# Patient Record
Sex: MALE | Race: WHITE | URBAN - METROPOLITAN AREA
[De-identification: names, ages, dates, MRNs, and addresses within clinical notes are randomized per-mention and may not be internally consistent; named-entity substitution may affect disease eponyms.]

---

## 2018-01-08 ENCOUNTER — APPOINTMENT (RX ONLY)
Dept: URBAN - METROPOLITAN AREA CLINIC 103 | Facility: CLINIC | Age: 68
Setting detail: DERMATOLOGY
End: 2018-01-08

## 2018-01-08 DIAGNOSIS — L57.0 ACTINIC KERATOSIS: ICD-10-CM | Status: STABLE

## 2018-01-08 DIAGNOSIS — L91.8 OTHER HYPERTROPHIC DISORDERS OF THE SKIN: ICD-10-CM

## 2018-01-08 DIAGNOSIS — L82.1 OTHER SEBORRHEIC KERATOSIS: ICD-10-CM

## 2018-01-08 PROBLEM — C44.311 BASAL CELL CARCINOMA OF SKIN OF NOSE: Status: ACTIVE | Noted: 2018-01-08

## 2018-01-08 PROBLEM — C44.529 SQUAMOUS CELL CARCINOMA OF SKIN OF OTHER PART OF TRUNK: Status: ACTIVE | Noted: 2018-01-08

## 2018-01-08 PROBLEM — C44.319 BASAL CELL CARCINOMA OF SKIN OF OTHER PARTS OF FACE: Status: ACTIVE | Noted: 2018-01-08

## 2018-01-08 PROCEDURE — 17003 DESTRUCT PREMALG LES 2-14: CPT

## 2018-01-08 PROCEDURE — ? BIOPSY BY SHAVE METHOD

## 2018-01-08 PROCEDURE — 99202 OFFICE O/P NEW SF 15 MIN: CPT | Mod: 25

## 2018-01-08 PROCEDURE — 11101: CPT

## 2018-01-08 PROCEDURE — 17000 DESTRUCT PREMALG LESION: CPT

## 2018-01-08 PROCEDURE — 11100: CPT | Mod: 59

## 2018-01-08 PROCEDURE — ? COUNSELING

## 2018-01-08 PROCEDURE — ? LIQUID NITROGEN

## 2018-01-08 ASSESSMENT — LOCATION DETAILED DESCRIPTION DERM
LOCATION DETAILED: LEFT INFERIOR FOREHEAD
LOCATION DETAILED: LEFT CLAVICULAR NECK

## 2018-01-08 ASSESSMENT — LOCATION SIMPLE DESCRIPTION DERM
LOCATION SIMPLE: LEFT ANTERIOR NECK
LOCATION SIMPLE: LEFT FOREHEAD

## 2018-01-08 ASSESSMENT — LOCATION ZONE DERM
LOCATION ZONE: FACE
LOCATION ZONE: NECK

## 2018-01-08 NOTE — PROCEDURE: LIQUID NITROGEN
Detail Level: Detailed
Consent: The patient's consent was obtained including but not limited to risks of crusting, scabbing, blistering, scarring, darker or lighter pigmentary change, recurrence, incomplete removal and infection.
Render Post-Care Instructions In Note?: no
Post-Care Instructions: I reviewed with the patient in detail post-care instructions. Patient is to wear sunprotection, and avoid picking at any of the treated lesions. Pt may apply Vaseline to crusted or scabbing areas.
Medical Necessity Clause: This procedure was medically necessary because the lesions that were treated were:
Detail Level: Zone
Duration Of Freeze Thaw-Cycle (Seconds): 0
Medical Necessity Information: It is in your best interest to select a reason for this procedure from the list below. All of these items fulfill various CMS LCD requirements except the new and changing color options.
Total Number Of Aks Treated: 3

## 2018-01-08 NOTE — PROCEDURE: BIOPSY BY SHAVE METHOD
Biopsy Type: H and E
Hemostasis: Drysol
Post-Care Instructions: I reviewed with the patient in detail post-care instructions. Patient is to keep the biopsy site dry overnight, and then apply bacitracin twice daily until healed. Patient may apply hydrogen peroxide soaks to remove any crusting.
Render Post-Care Instructions In Note?: no
X Size Of Lesion In Cm: 0
Anesthesia Type: 1% lidocaine with epinephrine
Size Of Lesion In Cm: 0.7
Biopsy Method: Acu-Razor
Dressing: bandage
Billing Type: Third-Party Bill
Curettage Text: The wound bed was treated with curettage after the biopsy was performed.
Electrodesiccation Text: The wound bed was treated with electrodesiccation after the biopsy was performed.
Notification Instructions: Patient will be notified of biopsy results. However, patient instructed to call the office if not contacted within 2 weeks.
Electrodesiccation And Curettage Text: The wound bed was treated with electrodesiccation and curettage after the biopsy was performed.
Anesthesia Volume In Cc: 0.5
Size Of Lesion In Cm: 0.4
Cryotherapy Text: The wound bed was treated with cryotherapy after the biopsy was performed.
Detail Level: Detailed
Consent: Written consent was obtained and risks were reviewed including but not limited to scarring, infection, bleeding, scabbing, incomplete removal, nerve damage and allergy to anesthesia.
Type Of Destruction Used: Curettage
Wound Care: Petrolatum
Silver Nitrate Text: The wound bed was treated with silver nitrate after the biopsy was performed.

## 2018-01-24 ENCOUNTER — APPOINTMENT (RX ONLY)
Dept: URBAN - METROPOLITAN AREA CLINIC 103 | Facility: CLINIC | Age: 68
Setting detail: DERMATOLOGY
End: 2018-01-24

## 2018-01-24 VITALS — SYSTOLIC BLOOD PRESSURE: 124 MMHG | DIASTOLIC BLOOD PRESSURE: 79 MMHG

## 2018-01-24 PROBLEM — C44.311 BASAL CELL CARCINOMA OF SKIN OF NOSE: Status: ACTIVE | Noted: 2018-01-24

## 2018-01-24 PROCEDURE — ? MOHS SURGERY

## 2018-01-24 PROCEDURE — 17311 MOHS 1 STAGE H/N/HF/G: CPT

## 2018-01-24 NOTE — PROCEDURE: MOHS SURGERY
Composite Graft Text: The defect edges were debeveled with a #15 scalpel blade.  Given the location of the defect, shape of the defect, the proximity to free margins and the fact the defect was full thickness a composite graft was deemed most appropriate.  The defect was outline and then transferred to the donor site.  A full thickness graft was then excised from the donor site. The graft was then placed in the primary defect, oriented appropriately and then sutured into place.  The secondary defect was then repaired using a primary closure.
Stage 11: Additional Anesthesia Type: 1% lidocaine with epinephrine
Tissue Cultured Epidermal Autograft Text: The defect edges were debeveled with a #15 scalpel blade.  Given the location of the defect, shape of the defect and the proximity to free margins a tissue cultured epidermal autograft was deemed most appropriate.  The graft was then trimmed to fit the size of the defect.  The graft was then placed in the primary defect and oriented appropriately.
Secondary Defect Length In Cm (Required For Flaps): 0
Bill For Surgical Tray: no
Complex Repair Preamble Text (Leave Blank If You Do Not Want): Extensive wide undermining was performed.
Display The Individual Mohs Indications As Separate Paragraphs: Yes
Localized Dermabrasion With Wire Brush Text: The patient was draped in routine manner.  Localized dermabrasion using 3 x 17 mm wire brush was performed in routine manner to papillary dermis. This spot dermabrasion is being performed to complete skin cancer reconstruction. It also will eliminate the other sun damaged precancerous cells that are known to be part of the regional effect of a lifetime's worth of sun exposure. This localized dermabrasion is therapeutic and should not be considered cosmetic in any regard.
Skin Substitute Text: The defect edges were debeveled with a #15 scalpel blade.  Given the location of the defect, shape of the defect and the proximity to free margins a skin substitute graft was deemed most appropriate.  The graft material was trimmed to fit the size of the defect. The graft was then placed in the primary defect and oriented appropriately.
Area L Indication Text: Tumors in this location are included in Area L (trunk and extremities).  Mohs surgery is indicated for larger tumors, or tumors with aggressive histologic features, in these anatomic locations.
Bi-Rhombic Flap Text: The defect edges were debeveled with a #15 scalpel blade.  Given the location of the defect and the proximity to free margins a bi-rhombic flap was deemed most appropriate.  Using a sterile surgical marker, an appropriate rhombic flap was drawn incorporating the defect. The area thus outlined was incised deep to adipose tissue with a #15 scalpel blade.  The skin margins were undermined to an appropriate distance in all directions utilizing iris scissors.
Inflammation Suggestive Of Cancer Camouflage Histology Text: There was a dense lymphocytic infiltrate which prevented adequate histologic evaluation of adjacent structures.
Mucosal Advancement Flap Text: Given the location of the defect, shape of the defect and the proximity to free margins a mucosal advancement flap was deemed most appropriate. Incisions were made with a 15 blade scalpel in the appropriate fashion along the cutaneous vermilion border and the mucosal lip. The remaining actinically damaged mucosal tissue was excised.  The mucosal advancement flap was then elevated to the gingival sulcus with care taken to preserve the neurovascular structures and advanced into the primary defect. Care was taken to ensure that precise realignment of the vermilion border was achieved.
Mohs Method Verbiage: An incision at a 45 degree angle following the standard Mohs approach was done and the specimen was harvested as a microscopic controlled layer.
Consent 3/Introductory Paragraph: I gave the patient a chance to ask questions they had about the procedure.  Following this I explained the Mohs procedure and consent was obtained. The risks, benefits and alternatives to therapy were discussed in detail. Specifically, the risks of infection, scarring, bleeding, prolonged wound healing, incomplete removal, allergy to anesthesia, nerve injury and recurrence were addressed. Prior to the procedure, the treatment site was clearly identified and confirmed by the patient. All components of Universal Protocol/PAUSE Rule completed.
Wound Care: Petrolatum
Burow's Advancement Flap Text: The defect edges were debeveled with a #15 scalpel blade.  Given the location of the defect and the proximity to free margins a Burow's advancement flap was deemed most appropriate.  Using a sterile surgical marker, the appropriate advancement flap was drawn incorporating the defect and placing the expected incisions within the relaxed skin tension lines where possible.    The area thus outlined was incised deep to adipose tissue with a #15 scalpel blade.  The skin margins were undermined to an appropriate distance in all directions utilizing iris scissors.
Consent 1/Introductory Paragraph: The rationale for Mohs was explained to the patient and consent was obtained. The risks, benefits and alternatives to therapy were discussed in detail. Specifically, the risks of infection, scarring, bleeding, prolonged wound healing, incomplete removal, allergy to anesthesia, nerve injury and recurrence were addressed. Prior to the procedure, the treatment site was clearly identified and confirmed by the patient. All components of Universal Protocol/PAUSE Rule completed.
O-T Advancement Flap Text: The defect edges were debeveled with a #15 scalpel blade.  Given the location of the defect, shape of the defect and the proximity to free margins an O-T advancement flap was deemed most appropriate.  Using a sterile surgical marker, an appropriate advancement flap was drawn incorporating the defect and placing the expected incisions within the relaxed skin tension lines where possible.    The area thus outlined was incised deep to adipose tissue with a #15 scalpel blade.  The skin margins were undermined to an appropriate distance in all directions utilizing iris scissors.
V-Y Flap Text: The defect edges were debeveled with a #15 scalpel blade.  Given the location of the defect, shape of the defect and the proximity to free margins a V-Y flap was deemed most appropriate.  Using a sterile surgical marker, an appropriate advancement flap was drawn incorporating the defect and placing the expected incisions within the relaxed skin tension lines where possible.    The area thus outlined was incised deep to adipose tissue with a #15 scalpel blade.  The skin margins were undermined to an appropriate distance in all directions utilizing iris scissors.
Consent (Spinal Accessory)/Introductory Paragraph: The rationale for Mohs was explained to the patient and consent was obtained. The risks, benefits and alternatives to therapy were discussed in detail. Specifically, the risks of damage to the spinal accessory nerve, infection, scarring, bleeding, prolonged wound healing, incomplete removal, allergy to anesthesia, and recurrence were addressed. Prior to the procedure, the treatment site was clearly identified and confirmed by the patient. All components of Universal Protocol/PAUSE Rule completed.
H Plasty Text: Given the location of the defect, shape of the defect and the proximity to free margins a H-plasty was deemed most appropriate for repair.  Using a sterile surgical marker, the appropriate advancement arms of the H-plasty were drawn incorporating the defect and placing the expected incisions within the relaxed skin tension lines where possible. The area thus outlined was incised deep to adipose tissue with a #15 scalpel blade. The skin margins were undermined to an appropriate distance in all directions utilizing iris scissors.  The opposing advancement arms were then advanced into place in opposite direction and anchored with interrupted buried subcutaneous sutures.
Referred To Asc For Closure Text (Leave Blank If You Do Not Want): After obtaining clear surgical margins the patient was sent to an ASC for surgical repair.  The patient understands they will receive post-surgical care and follow-up from the ASC physician.
Tarsorrhaphy Text: A tarsorrhaphy was performed using Frost sutures.
Purse String (Intermediate) Text: Given the location of the defect and the characteristics of the surrounding skin a purse string intermediate closure was deemed most appropriate.  Undermining was performed circumfirentially around the surgical defect.  A purse string suture was then placed and tightened.
Repair Type: No repair - secondary intention
Crescentic Intermediate Repair Preamble Text (Leave Blank If You Do Not Want): Undermining was performed with blunt dissection.
Modified Advancement Flap Text: The defect edges were debeveled with a #15 scalpel blade.  Given the location of the defect, shape of the defect and the proximity to free margins a modified advancement flap was deemed most appropriate.  Using a sterile surgical marker, an appropriate advancement flap was drawn incorporating the defect and placing the expected incisions within the relaxed skin tension lines where possible.    The area thus outlined was incised deep to adipose tissue with a #15 scalpel blade.  The skin margins were undermined to an appropriate distance in all directions utilizing iris scissors.
Surgical Defect Length In Cm (Optional): 0.6
Partial Purse String (Intermediate) Text: Given the location of the defect and the characteristics of the surrounding skin an intermediate purse string closure was deemed most appropriate.  Undermining was performed circumfirentially around the surgical defect.  A purse string suture was then placed and tightened. Wound tension only allowed a partial closure of the circular defect.
Number Of Stages: 1
Alar Island Pedicle Flap Text: The defect edges were debeveled with a #15 scalpel blade.  Given the location of the defect, shape of the defect and the proximity to the alar rim an island pedicle advancement flap was deemed most appropriate.  Using a sterile surgical marker, an appropriate advancement flap was drawn incorporating the defect, outlining the appropriate donor tissue and placing the expected incisions within the nasal ala running parallel to the alar rim. The area thus outlined was incised with a #15 scalpel blade.  The skin margins were undermined minimally to an appropriate distance in all directions around the primary defect and laterally outward around the island pedicle utilizing iris scissors.  There was minimal undermining beneath the pedicle flap.
Dressing (No Sutures): dry sterile dressing
Secondary Intention Text (Leave Blank If You Do Not Want): The defect will heal with secondary intention.
Keystone Flap Text: The defect edges were debeveled with a #15 scalpel blade.  Given the location of the defect, shape of the defect a keystone flap was deemed most appropriate.  Using a sterile surgical marker, an appropriate keystone flap was drawn incorporating the defect, outlining the appropriate donor tissue and placing the expected incisions within the relaxed skin tension lines where possible. The area thus outlined was incised deep to adipose tissue with a #15 scalpel blade.  The skin margins were undermined to an appropriate distance in all directions around the primary defect and laterally outward around the flap utilizing iris scissors.
Bcc Histology Text: There were numerous aggregates of basaloid cells.
Paramedian Forehead Flap Text: A decision was made to reconstruct the defect utilizing an interpolation axial flap and a staged reconstruction.  A telfa template was made of the defect.  This telfa template was then used to outline the paramedian forehead pedicle flap.  The donor area for the pedicle flap was then injected with anesthesia.  The flap was excised through the skin and subcutaneous tissue down to the layer of the underlying musculature.  The pedicle flap was carefully excised within this deep plane to maintain its blood supply.  The edges of the donor site were undermined.   The donor site was closed in a primary fashion.  The pedicle was then rotated into position and sutured.  Once the tube was sutured into place, adequate blood supply was confirmed with blanching and refill.  The pedicle was then wrapped with xeroform gauze and dressed appropriately with a telfa and gauze bandage to ensure continued blood supply and protect the attached pedicle.
Helical Rim Advancement Flap Text: The defect edges were debeveled with a #15 blade scalpel.  Given the location of the defect and the proximity to free margins (helical rim) a double helical rim advancement flap was deemed most appropriate.  Using a sterile surgical marker, the appropriate advancement flaps were drawn incorporating the defect and placing the expected incisions between the helical rim and antihelix where possible.  The area thus outlined was incised through and through with a #15 scalpel blade.  With a skin hook and iris scissors, the flaps were gently and sharply undermined and freed up.
Closure 2 Information: This tab is for additional flaps and grafts, including complex repair and grafts and complex repair and flaps. You can also specify a different location for the additional defect, if the location is the same you do not need to select a new one. We will insert the automated text for the repair you select below just as we do for solitary flaps and grafts. Please note that at this time if you select a location with a different insurance zone you will need to override the ICD10 and CPT if appropriate.
Eye Protection Verbiage: Before proceeding with the stage, a plastic scleral shield was inserted. The globe was anesthetized with a few drops of 1% lidocaine with 1:100,000 epinephrine. Then, an appropriate sized scleral shield was chosen and coated with lacrilube ointment. The shield was gently inserted and left in place for the duration of each stage. After the stage was completed, the shield was gently removed.
V-Y Plasty Text: The defect edges were debeveled with a #15 scalpel blade.  Given the location of the defect, shape of the defect and the proximity to free margins an V-Y advancement flap was deemed most appropriate.  Using a sterile surgical marker, an appropriate advancement flap was drawn incorporating the defect and placing the expected incisions within the relaxed skin tension lines where possible.    The area thus outlined was incised deep to adipose tissue with a #15 scalpel blade.  The skin margins were undermined to an appropriate distance in all directions utilizing iris scissors.
Closure 3 Information: This tab is for additional flaps and grafts above and beyond our usual structured repairs.  Please note if you enter information here it will not currently bill and you will need to add the billing information manually.
Rhombic Flap Text: The defect edges were debeveled with a #15 scalpel blade.  Given the location of the defect and the proximity to free margins a rhombic flap was deemed most appropriate.  Using a sterile surgical marker, an appropriate rhombic flap was drawn incorporating the defect.    The area thus outlined was incised deep to adipose tissue with a #15 scalpel blade.  The skin margins were undermined to an appropriate distance in all directions utilizing iris scissors.
Bilobed Transposition Flap Text: The defect edges were debeveled with a #15 scalpel blade.  Given the location of the defect and the proximity to free margins a bilobed transposition flap was deemed most appropriate.  Using a sterile surgical marker, an appropriate bilobe flap drawn around the defect.    The area thus outlined was incised deep to adipose tissue with a #15 scalpel blade.  The skin margins were undermined to an appropriate distance in all directions utilizing iris scissors.
Same Histology In Subsequent Stages Text: The pattern and morphology of the tumor is as described in the first stage.
Advancement-Rotation Flap Text: The defect edges were debeveled with a #15 scalpel blade.  Given the location of the defect, shape of the defect and the proximity to free margins an advancement-rotation flap was deemed most appropriate.  Using a sterile surgical marker, an appropriate flap was drawn incorporating the defect and placing the expected incisions within the relaxed skin tension lines where possible. The area thus outlined was incised deep to adipose tissue with a #15 scalpel blade.  The skin margins were undermined to an appropriate distance in all directions utilizing iris scissors.
Muscle Hinge Flap Text: The defect edges were debeveled with a #15 scalpel blade.  Given the size, depth and location of the defect and the proximity to free margins a muscle hinge flap was deemed most appropriate.  Using a sterile surgical marker, an appropriate hinge flap was drawn incorporating the defect. The area thus outlined was incised with a #15 scalpel blade.  The skin margins were undermined to an appropriate distance in all directions utilizing iris scissors.
Epidermal Closure: simple interrupted
Cheiloplasty (Less Than 50%) Text: A decision was made to reconstruct the defect with a  cheiloplasty.  The defect was undermined extensively.  Additional obicularis oris muscle was excised with a 15 blade scalpel.  The defect was converted into a full thickness wedge, of less than 50% of the vertical height of the lip, to facilite a better cosmetic result.  Small vessels were then tied off with 5-0 monocyrl. The obicularis oris, superficial fascia, adipose and dermis were then reapproximated.  After the deeper layers were approximated the epidermis was reapproximated with particular care given to realign the vermilion border.
Home Suture Removal Text: Patient was provided instructions on removing sutures and will remove their sutures at home.  If they have any questions or difficulties they will call the office.
Mastoid Interpolation Flap Text: A decision was made to reconstruct the defect utilizing an interpolation axial flap and a staged reconstruction.  A telfa template was made of the defect.  This telfa template was then used to outline the mastoid interpolation flap.  The donor area for the pedicle flap was then injected with anesthesia.  The flap was excised through the skin and subcutaneous tissue down to the layer of the underlying musculature.  The pedicle flap was carefully excised within this deep plane to maintain its blood supply.  The edges of the donor site were undermined.   The donor site was closed in a primary fashion.  The pedicle was then rotated into position and sutured.  Once the tube was sutured into place, adequate blood supply was confirmed with blanching and refill.  The pedicle was then wrapped with xeroform gauze and dressed appropriately with a telfa and gauze bandage to ensure continued blood supply and protect the attached pedicle.
Split-Thickness Skin Graft Text: The defect edges were debeveled with a #15 scalpel blade.  Given the location of the defect, shape of the defect and the proximity to free margins a split thickness skin graft was deemed most appropriate.  Using a sterile surgical marker, the primary defect shape was transferred to the donor site. The split thickness graft was then harvested.  The skin graft was then placed in the primary defect and oriented appropriately.
O-L Flap Text: The defect edges were debeveled with a #15 scalpel blade.  Given the location of the defect, shape of the defect and the proximity to free margins an O-L flap was deemed most appropriate.  Using a sterile surgical marker, an appropriate advancement flap was drawn incorporating the defect and placing the expected incisions within the relaxed skin tension lines where possible.    The area thus outlined was incised deep to adipose tissue with a #15 scalpel blade.  The skin margins were undermined to an appropriate distance in all directions utilizing iris scissors.
Alternatives Discussed Intro (Do Not Add Period): I discussed alternative treatments to Mohs surgery and specifically discussed the risks and benefits of
Purse String (Simple) Text: Given the location of the defect and the characteristics of the surrounding skin a purse string closure was deemed most appropriate.  Undermining was performed circumfirentially around the surgical defect.  A purse string suture was then placed and tightened.
O-Z Plasty Text: The defect edges were debeveled with a #15 scalpel blade.  Given the location of the defect, shape of the defect and the proximity to free margins an O-Z plasty (double transposition flap) was deemed most appropriate.  Using a sterile surgical marker, the appropriate transposition flaps were drawn incorporating the defect and placing the expected incisions within the relaxed skin tension lines where possible.    The area thus outlined was incised deep to adipose tissue with a #15 scalpel blade.  The skin margins were undermined to an appropriate distance in all directions utilizing iris scissors.  Hemostasis was achieved with electrocautery.  The flaps were then transposed into place, one clockwise and the other counterclockwise, and anchored with interrupted buried subcutaneous sutures.
Donor Site Anesthesia Type: same as repair anesthesia
Trilobed Flap Text: The defect edges were debeveled with a #15 scalpel blade.  Given the location of the defect and the proximity to free margins a trilobed flap was deemed most appropriate.  Using a sterile surgical marker, an appropriate trilobed flap drawn around the defect.    The area thus outlined was incised deep to adipose tissue with a #15 scalpel blade.  The skin margins were undermined to an appropriate distance in all directions utilizing iris scissors.
Subsequent Stages Histo Method Verbiage: Using a similar technique to that described above, a thin layer of tissue was removed from all areas where tumor was visible on the previous stage.  The tissue was again oriented, mapped, dyed, and processed as above.
Ear Star Wedge Flap Text: The defect edges were debeveled with a #15 blade scalpel.  Given the location of the defect and the proximity to free margins (helical rim) an ear star wedge flap was deemed most appropriate.  Using a sterile surgical marker, the appropriate flap was drawn incorporating the defect and placing the expected incisions between the helical rim and antihelix where possible.  The area thus outlined was incised through and through with a #15 scalpel blade.
Z Plasty Text: The lesion was extirpated to the level of the fat with a #15 scalpel blade.  Given the location of the defect, shape of the defect and the proximity to free margins a Z-plasty was deemed most appropriate for repair.  Using a sterile surgical marker, the appropriate transposition arms of the Z-plasty were drawn incorporating the defect and placing the expected incisions within the relaxed skin tension lines where possible.    The area thus outlined was incised deep to adipose tissue with a #15 scalpel blade.  The skin margins were undermined to an appropriate distance in all directions utilizing iris scissors.  The opposing transposition arms were then transposed into place in opposite direction and anchored with interrupted buried subcutaneous sutures.
S Plasty Text: Given the location and shape of the defect, and the orientation of relaxed skin tension lines, an S-plasty was deemed most appropriate for repair.  Using a sterile surgical marker, the appropriate outline of the S-plasty was drawn, incorporating the defect and placing the expected incisions within the relaxed skin tension lines where possible.  The area thus outlined was incised deep to adipose tissue with a #15 scalpel blade.  The skin margins were undermined to an appropriate distance in all directions utilizing iris scissors. The skin flaps were advanced over the defect.  The opposing margins were then approximated with interrupted buried subcutaneous sutures.
Referred To Otolaryngology For Closure Text (Leave Blank If You Do Not Want): After obtaining clear surgical margins the patient was sent to otolaryngology for surgical repair.  The patient understands they will receive post-surgical care and follow-up from the referring physician's office.
Ftsg Text: The defect edges were debeveled with a #15 scalpel blade.  Given the location of the defect, shape of the defect and the proximity to free margins a full thickness skin graft was deemed most appropriate.  Using a sterile surgical marker, the primary defect shape was transferred to the donor site. The area thus outlined was incised deep to adipose tissue with a #15 scalpel blade.  The harvested graft was then trimmed of adipose tissue until only dermis and epidermis was left.  The skin margins of the secondary defect were undermined to an appropriate distance in all directions utilizing iris scissors.  The secondary defect was closed with interrupted buried subcutaneous sutures.  The skin edges were then re-apposed with running  sutures.  The skin graft was then placed in the primary defect and oriented appropriately.
Crescentic Advancement Flap Text: The defect edges were debeveled with a #15 scalpel blade.  Given the location of the defect and the proximity to free margins a crescentic advancement flap was deemed most appropriate.  Using a sterile surgical marker, the appropriate advancement flap was drawn incorporating the defect and placing the expected incisions within the relaxed skin tension lines where possible.    The area thus outlined was incised deep to adipose tissue with a #15 scalpel blade.  The skin margins were undermined to an appropriate distance in all directions utilizing iris scissors.
Referred To Plastics For Closure Text (Leave Blank If You Do Not Want): After obtaining clear surgical margins the patient was sent to plastics for surgical repair.  The patient understands they will receive post-surgical care and follow-up from the referring physician's office.
Location Indication Override (Is Already Calculated Based On Selected Body Location): Area H
Unna Boot Text: An Unna boot was placed to help immobilize the limb and facilitate more rapid healing.
Partial Purse String (Simple) Text: Given the location of the defect and the characteristics of the surrounding skin a simple purse string closure was deemed most appropriate.  Undermining was performed circumfirentially around the surgical defect.  A purse string suture was then placed and tightened. Wound tension only allowed a partial closure of the circular defect.
Consent (Scalp)/Introductory Paragraph: The rationale for Mohs was explained to the patient and consent was obtained. The risks, benefits and alternatives to therapy were discussed in detail. Specifically, the risks of changes in hair growth pattern secondary to repair, infection, scarring, bleeding, prolonged wound healing, incomplete removal, allergy to anesthesia, nerve injury and recurrence were addressed. Prior to the procedure, the treatment site was clearly identified and confirmed by the patient. All components of Universal Protocol/PAUSE Rule completed.
Mohs Case Number: [id number]
Melolabial Transposition Flap Text: The defect edges were debeveled with a #15 scalpel blade.  Given the location of the defect and the proximity to free margins a melolabial flap was deemed most appropriate.  Using a sterile surgical marker, an appropriate melolabial transposition flap was drawn incorporating the defect.    The area thus outlined was incised deep to adipose tissue with a #15 scalpel blade.  The skin margins were undermined to an appropriate distance in all directions utilizing iris scissors.
Dorsal Nasal Flap Text: The defect edges were debeveled with a #15 scalpel blade.  Given the location of the defect and the proximity to free margins a dorsal nasal flap was deemed most appropriate.  Using a sterile surgical marker, an appropriate dorsal nasal flap was drawn around the defect.    The area thus outlined was incised deep to adipose tissue with a #15 scalpel blade.  The skin margins were undermined to an appropriate distance in all directions utilizing iris scissors.
Surgeon/Pathologist Verbiage (Will Incorporate Name Of Surgeon From Intro If Not Blank): operated in two distinct and integrated capacities as the surgeon and pathologist.
Advancement Flap (Double) Text: The defect edges were debeveled with a #15 scalpel blade.  Given the location of the defect and the proximity to free margins a double advancement flap was deemed most appropriate.  Using a sterile surgical marker, the appropriate advancement flaps were drawn incorporating the defect and placing the expected incisions within the relaxed skin tension lines where possible.    The area thus outlined was incised deep to adipose tissue with a #15 scalpel blade.  The skin margins were undermined to an appropriate distance in all directions utilizing iris scissors.
Bcc Infiltrative Histology Text: There were numerous aggregates of basaloid cells demonstrating an infiltrative pattern.
A-T Advancement Flap Text: The defect edges were debeveled with a #15 scalpel blade.  Given the location of the defect, shape of the defect and the proximity to free margins an A-T advancement flap was deemed most appropriate.  Using a sterile surgical marker, an appropriate advancement flap was drawn incorporating the defect and placing the expected incisions within the relaxed skin tension lines where possible.    The area thus outlined was incised deep to adipose tissue with a #15 scalpel blade.  The skin margins were undermined to an appropriate distance in all directions utilizing iris scissors.
Hemostasis: Electrocautery
Bilobed Flap Text: The defect edges were debeveled with a #15 scalpel blade.  Given the location of the defect and the proximity to free margins a bilobe flap was deemed most appropriate.  Using a sterile surgical marker, an appropriate bilobe flap drawn around the defect.    The area thus outlined was incised deep to adipose tissue with a #15 scalpel blade.  The skin margins were undermined to an appropriate distance in all directions utilizing iris scissors.
Rotation Flap Text: The defect edges were debeveled with a #15 scalpel blade.  Given the location of the defect, shape of the defect and the proximity to free margins a rotation flap was deemed most appropriate.  Using a sterile surgical marker, an appropriate rotation flap was drawn incorporating the defect and placing the expected incisions within the relaxed skin tension lines where possible.    The area thus outlined was incised deep to adipose tissue with a #15 scalpel blade.  The skin margins were undermined to an appropriate distance in all directions utilizing iris scissors.
Double Island Pedicle Flap Text: The defect edges were debeveled with a #15 scalpel blade.  Given the location of the defect, shape of the defect and the proximity to free margins a double island pedicle advancement flap was deemed most appropriate.  Using a sterile surgical marker, an appropriate advancement flap was drawn incorporating the defect, outlining the appropriate donor tissue and placing the expected incisions within the relaxed skin tension lines where possible.    The area thus outlined was incised deep to adipose tissue with a #15 scalpel blade.  The skin margins were undermined to an appropriate distance in all directions around the primary defect and laterally outward around the island pedicle utilizing iris scissors.  There was minimal undermining beneath the pedicle flap.
Area H Indication Text: Tumors in this location are included in Area H (eyelids, eyebrows, nose, lips, chin, ear, pre-auricular, post-auricular, temple, genitalia, hands, feet, ankles and areola).  Tissue conservation is critical in these anatomic locations.
Manual Repair Warning Statement: We plan on removing the manually selected variable below in favor of our much easier automatic structured text blocks found in the previous tab. We decided to do this to help make the flow better and give you the full power of structured data. Manual selection is never going to be ideal in our platform and I would encourage you to avoid using manual selection from this point on, especially since I will be sunsetting this feature. It is important that you do one of two things with the customized text below. First, you can save all of the text in a word file so you can have it for future reference. Second, transfer the text to the appropriate area in the Library tab. Lastly, if there is a flap or graft type which we do not have you need to let us know right away so I can add it in before the variable is hidden. No need to panic, we plan to give you roughly 6 months to make the change.
Melolabial Interpolation Flap Text: A decision was made to reconstruct the defect utilizing an interpolation axial flap and a staged reconstruction.  A telfa template was made of the defect.  This telfa template was then used to outline the melolabial interpolation flap.  The donor area for the pedicle flap was then injected with anesthesia.  The flap was excised through the skin and subcutaneous tissue down to the layer of the underlying musculature.  The pedicle flap was carefully excised within this deep plane to maintain its blood supply.  The edges of the donor site were undermined.   The donor site was closed in a primary fashion.  The pedicle was then rotated into position and sutured.  Once the tube was sutured into place, adequate blood supply was confirmed with blanching and refill.  The pedicle was then wrapped with xeroform gauze and dressed appropriately with a telfa and gauze bandage to ensure continued blood supply and protect the attached pedicle.
Mohs Rapid Report Verbiage: The area of clinically evident tumor was marked with skin marking ink and appropriately hatched.  The initial incision was made following the Mohs approach through the skin.  The specimen was taken to the lab, divided into the necessary number of pieces, chromacoded and processed according to the Mohs protocol.  This was repeated in successive stages until a tumor free defect was achieved.
Postop Diagnosis: same
Island Pedicle Flap With Canthal Suspension Text: The defect edges were debeveled with a #15 scalpel blade.  Given the location of the defect, shape of the defect and the proximity to free margins an island pedicle advancement flap was deemed most appropriate.  Using a sterile surgical marker, an appropriate advancement flap was drawn incorporating the defect, outlining the appropriate donor tissue and placing the expected incisions within the relaxed skin tension lines where possible. The area thus outlined was incised deep to adipose tissue with a #15 scalpel blade.  The skin margins were undermined to an appropriate distance in all directions around the primary defect and laterally outward around the island pedicle utilizing iris scissors.  There was minimal undermining beneath the pedicle flap. A suspension suture was placed in the canthal tendon to prevent tension and prevent ectropion.
Island Pedicle Flap Text: The defect edges were debeveled with a #15 scalpel blade.  Given the location of the defect, shape of the defect and the proximity to free margins an island pedicle advancement flap was deemed most appropriate.  Using a sterile surgical marker, an appropriate advancement flap was drawn incorporating the defect, outlining the appropriate donor tissue and placing the expected incisions within the relaxed skin tension lines where possible.    The area thus outlined was incised deep to adipose tissue with a #15 scalpel blade.  The skin margins were undermined to an appropriate distance in all directions around the primary defect and laterally outward around the island pedicle utilizing iris scissors.  There was minimal undermining beneath the pedicle flap.
Cartilage Graft Text: The defect edges were debeveled with a #15 scalpel blade.  Given the location of the defect, shape of the defect, the fact the defect involved a full thickness cartilage defect a cartilage graft was deemed most appropriate.  An appropriate donor site was identified, cleansed, and anesthetized. The cartilage graft was then harvested and transferred to the recipient site, oriented appropriately and then sutured into place.  The secondary defect was then repaired using a primary closure.
No Residual Tumor Seen Histology Text: There were no malignant cells seen in the sections examined.
Date Of Previous Biopsy (Optional): 01/08/18
Epidermal Sutures: 6-0 Ethilon
Suture Removal: 7 days
Graft Donor Site Bandage (Optional-Leave Blank If You Don't Want In Note): Steri-strips and a pressure bandage were applied to the donor site.
Xenograft Text: The defect edges were debeveled with a #15 scalpel blade.  Given the location of the defect, shape of the defect and the proximity to free margins a xenograft was deemed most appropriate.  The graft was then trimmed to fit the size of the defect.  The graft was then placed in the primary defect and oriented appropriately.
Consent (Marginal Mandibular)/Introductory Paragraph: The rationale for Mohs was explained to the patient and consent was obtained. The risks, benefits and alternatives to therapy were discussed in detail. Specifically, the risks of damage to the marginal mandibular branch of the facial nerve, infection, scarring, bleeding, prolonged wound healing, incomplete removal, allergy to anesthesia, and recurrence were addressed. Prior to the procedure, the treatment site was clearly identified and confirmed by the patient. All components of Universal Protocol/PAUSE Rule completed.
Transposition Flap Text: The defect edges were debeveled with a #15 scalpel blade.  Given the location of the defect and the proximity to free margins a transposition flap was deemed most appropriate.  Using a sterile surgical marker, an appropriate transposition flap was drawn incorporating the defect.    The area thus outlined was incised deep to adipose tissue with a #15 scalpel blade.  The skin margins were undermined to an appropriate distance in all directions utilizing iris scissors.
Consent (Nose)/Introductory Paragraph: The rationale for Mohs was explained to the patient and consent was obtained. The risks, benefits and alternatives to therapy were discussed in detail. Specifically, the risks of nasal deformity, changes in the flow of air through the nose, infection, scarring, bleeding, prolonged wound healing, incomplete removal, allergy to anesthesia, nerve injury and recurrence were addressed. Prior to the procedure, the treatment site was clearly identified and confirmed by the patient. All components of Universal Protocol/PAUSE Rule completed.
Area M Indication Text: Tumors in this location are included in Area M (cheek, forehead, scalp, neck, jawline and pretibial skin).  Mohs surgery is indicated for tumors in these anatomic locations.
O-T Plasty Text: The defect edges were debeveled with a #15 scalpel blade.  Given the location of the defect, shape of the defect and the proximity to free margins an O-T plasty was deemed most appropriate.  Using a sterile surgical marker, an appropriate O-T plasty was drawn incorporating the defect and placing the expected incisions within the relaxed skin tension lines where possible.    The area thus outlined was incised deep to adipose tissue with a #15 scalpel blade.  The skin margins were undermined to an appropriate distance in all directions utilizing iris scissors.
Hatchet Flap Text: The defect edges were debeveled with a #15 scalpel blade.  Given the location of the defect, shape of the defect and the proximity to free margins a hatchet flap was deemed most appropriate.  Using a sterile surgical marker, an appropriate hatchet flap was drawn incorporating the defect and placing the expected incisions within the relaxed skin tension lines where possible.    The area thus outlined was incised deep to adipose tissue with a #15 scalpel blade.  The skin margins were undermined to an appropriate distance in all directions utilizing iris scissors.
W Plasty Text: The lesion was extirpated to the level of the fat with a #15 scalpel blade.  Given the location of the defect, shape of the defect and the proximity to free margins a W-plasty was deemed most appropriate for repair.  Using a sterile surgical marker, the appropriate transposition arms of the W-plasty were drawn incorporating the defect and placing the expected incisions within the relaxed skin tension lines where possible.    The area thus outlined was incised deep to adipose tissue with a #15 scalpel blade.  The skin margins were undermined to an appropriate distance in all directions utilizing iris scissors.  The opposing transposition arms were then transposed into place in opposite direction and anchored with interrupted buried subcutaneous sutures.
Mohs Histo Method Verbiage: Each section was then chromacoded and processed in the Mohs lab using the Mohs protocol and submitted for frozen section.
Spiral Flap Text: The defect edges were debeveled with a #15 scalpel blade.  Given the location of the defect, shape of the defect and the proximity to free margins a spiral flap was deemed most appropriate.  Using a sterile surgical marker, an appropriate rotation flap was drawn incorporating the defect and placing the expected incisions within the relaxed skin tension lines where possible. The area thus outlined was incised deep to adipose tissue with a #15 scalpel blade.  The skin margins were undermined to an appropriate distance in all directions utilizing iris scissors.
Initial Size Of Lesion: 0.5
Referred To Oculoplastics For Closure Text (Leave Blank If You Do Not Want): After obtaining clear surgical margins the patient was sent to oculoplastics for surgical repair.  The patient understands they will receive post-surgical care and follow-up from the referring physician's office.
Ear Wedge Repair Text: A wedge excision was completed by carrying down an excision through the full thickness of the ear and cartilage with an inward facing Burow's triangle. The wound was then closed in a layered fashion.
Dermal Autograft Text: The defect edges were debeveled with a #15 scalpel blade.  Given the location of the defect, shape of the defect and the proximity to free margins a dermal autograft was deemed most appropriate.  Using a sterile surgical marker, the primary defect shape was transferred to the donor site. The area thus outlined was incised deep to adipose tissue with a #15 scalpel blade.  The harvested graft was then trimmed of adipose and epidermal tissue until only dermis was left.  The skin graft was then placed in the primary defect and oriented appropriately.
Bilateral Helical Rim Advancement Flap Text: The defect edges were debeveled with a #15 blade scalpel.  Given the location of the defect and the proximity to free margins (helical rim) a bilateral helical rim advancement flap was deemed most appropriate.  Using a sterile surgical marker, the appropriate advancement flaps were drawn incorporating the defect and placing the expected incisions between the helical rim and antihelix where possible.  The area thus outlined was incised through and through with a #15 scalpel blade.  With a skin hook and iris scissors, the flaps were gently and sharply undermined and freed up.
Cheek Interpolation Flap Text: A decision was made to reconstruct the defect utilizing an interpolation axial flap and a staged reconstruction.  A telfa template was made of the defect.  This telfa template was then used to outline the Cheek Interpolation flap.  The donor area for the pedicle flap was then injected with anesthesia.  The flap was excised through the skin and subcutaneous tissue down to the layer of the underlying musculature.  The interpolation flap was carefully excised within this deep plane to maintain its blood supply.  The edges of the donor site were undermined.   The donor site was closed in a primary fashion.  The pedicle was then rotated into position and sutured.  Once the tube was sutured into place, adequate blood supply was confirmed with blanching and refill.  The pedicle was then wrapped with xeroform gauze and dressed appropriately with a telfa and gauze bandage to ensure continued blood supply and protect the attached pedicle.
Advancement Flap (Single) Text: The defect edges were debeveled with a #15 scalpel blade.  Given the location of the defect and the proximity to free margins a single advancement flap was deemed most appropriate.  Using a sterile surgical marker, an appropriate advancement flap was drawn incorporating the defect and placing the expected incisions within the relaxed skin tension lines where possible.    The area thus outlined was incised deep to adipose tissue with a #15 scalpel blade.  The skin margins were undermined to an appropriate distance in all directions utilizing iris scissors.
Consent (Lip)/Introductory Paragraph: The rationale for Mohs was explained to the patient and consent was obtained. The risks, benefits and alternatives to therapy were discussed in detail. Specifically, the risks of lip deformity, changes in the oral aperture, infection, scarring, bleeding, prolonged wound healing, incomplete removal, allergy to anesthesia, nerve injury and recurrence were addressed. Prior to the procedure, the treatment site was clearly identified and confirmed by the patient. All components of Universal Protocol/PAUSE Rule completed.
Complex Repair And Flap Additional Text (Will Appearing After The Standard Complex Repair Text): The complex repair was not sufficient to completely close the primary defect. The remaining additional defect was repaired with the flap mentioned below.
No Repair - Repaired With Adjacent Surgical Defect Text (Leave Blank If You Do Not Want): After obtaining clear surgical margins the defect was repaired concurrently with another surgical defect which was in close approximation.
Epidermal Autograft Text: The defect edges were debeveled with a #15 scalpel blade.  Given the location of the defect, shape of the defect and the proximity to free margins an epidermal autograft was deemed most appropriate.  Using a sterile surgical marker, the primary defect shape was transferred to the donor site. The epidermal graft was then harvested.  The skin graft was then placed in the primary defect and oriented appropriately.
Star Wedge Flap Text: The defect edges were debeveled with a #15 scalpel blade.  Given the location of the defect, shape of the defect and the proximity to free margins a star wedge flap was deemed most appropriate.  Using a sterile surgical marker, an appropriate rotation flap was drawn incorporating the defect and placing the expected incisions within the relaxed skin tension lines where possible. The area thus outlined was incised deep to adipose tissue with a #15 scalpel blade.  The skin margins were undermined to an appropriate distance in all directions utilizing iris scissors.
Posterior Auricular Interpolation Flap Text: A decision was made to reconstruct the defect utilizing an interpolation axial flap and a staged reconstruction.  A telfa template was made of the defect.  This telfa template was then used to outline the posterior auricular interpolation flap.  The donor area for the pedicle flap was then injected with anesthesia.  The flap was excised through the skin and subcutaneous tissue down to the layer of the underlying musculature.  The pedicle flap was carefully excised within this deep plane to maintain its blood supply.  The edges of the donor site were undermined.   The donor site was closed in a primary fashion.  The pedicle was then rotated into position and sutured.  Once the tube was sutured into place, adequate blood supply was confirmed with blanching and refill.  The pedicle was then wrapped with xeroform gauze and dressed appropriately with a telfa and gauze bandage to ensure continued blood supply and protect the attached pedicle.
Full Thickness Lip Wedge Repair (Flap) Text: Given the location of the defect and the proximity to free margins a full thickness wedge repair was deemed most appropriate.  Using a sterile surgical marker, the appropriate repair was drawn incorporating the defect and placing the expected incisions perpendicular to the vermilion border.  The vermilion border was also meticulously outlined to ensure appropriate reapproximation during the repair.  The area thus outlined was incised through and through with a #15 scalpel blade.  The muscularis and dermis were reaproximated with deep sutures following hemostasis. Care was taken to realign the vermilion border before proceeding with the superficial closure.  Once the vermilion was realigned the superfical and mucosal closure was finished.
Tumor Debulked?: not debulked
Interpolation Flap Text: A decision was made to reconstruct the defect utilizing an interpolation axial flap and a staged reconstruction.  A telfa template was made of the defect.  This telfa template was then used to outline the interpolation flap.  The donor area for the pedicle flap was then injected with anesthesia.  The flap was excised through the skin and subcutaneous tissue down to the layer of the underlying musculature.  The interpolation flap was carefully excised within this deep plane to maintain its blood supply.  The edges of the donor site were undermined.   The donor site was closed in a primary fashion.  The pedicle was then rotated into position and sutured.  Once the tube was sutured into place, adequate blood supply was confirmed with blanching and refill.  The pedicle was then wrapped with xeroform gauze and dressed appropriately with a telfa and gauze bandage to ensure continued blood supply and protect the attached pedicle.
Consent Type: Consent 1 (Standard)
Mauc Instructions: By selecting yes to the question below the MAUC number will be added into the note.  This will be calculated automatically based on the diagnosis chosen, the size entered, the body zone selected (H,M,L) and the specific indications you chose. You will also have the option to override the Mohs AUC if you disagree with the automatically calculated number and this option is found in the Case Summary tab.
Detail Level: Simple
Deep Sutures: 5-0 Vicryl
Estimated Blood Loss (Cc): minimal
Island Pedicle Flap-Requiring Vessel Identification Text: The defect edges were debeveled with a #15 scalpel blade.  Given the location of the defect, shape of the defect and the proximity to free margins an island pedicle advancement flap was deemed most appropriate.  Using a sterile surgical marker, an appropriate advancement flap was drawn, based on the axial vessel mentioned above, incorporating the defect, outlining the appropriate donor tissue and placing the expected incisions within the relaxed skin tension lines where possible.    The area thus outlined was incised deep to adipose tissue with a #15 scalpel blade.  The skin margins were undermined to an appropriate distance in all directions around the primary defect and laterally outward around the island pedicle utilizing iris scissors.  There was minimal undermining beneath the pedicle flap.
Consent (Temporal Branch)/Introductory Paragraph: The rationale for Mohs was explained to the patient and consent was obtained. The risks, benefits and alternatives to therapy were discussed in detail. Specifically, the risks of damage to the temporal branch of the facial nerve, infection, scarring, bleeding, prolonged wound healing, incomplete removal, allergy to anesthesia, and recurrence were addressed. Prior to the procedure, the treatment site was clearly identified and confirmed by the patient. All components of Universal Protocol/PAUSE Rule completed.
Cheiloplasty (Complex) Text: A decision was made to reconstruct the defect with a  cheiloplasty.  The defect was undermined extensively.  Additional obicularis oris muscle was excised with a 15 blade scalpel.  The defect was converted into a full thickness wedge to facilite a better cosmetic result.  Small vessels were then tied off with 5-0 monocyrl. The obicularis oris, superficial fascia, adipose and dermis were then reapproximated.  After the deeper layers were approximated the epidermis was reapproximated with particular care given to realign the vermilion border.
Complex Repair And Graft Additional Text (Will Appearing After The Standard Complex Repair Text): The complex repair was not sufficient to completely close the primary defect. The remaining additional defect was repaired with the graft mentioned below.
Repair Performed By Another Provider Text (Leave Blank If You Do Not Want): After obtaining clear surgical margins the defect was repaired by another provider.
Post-Care Instructions: I reviewed with the patient in detail post-care instructions. Patient is not to engage in any heavy lifting, exercise, or swimming for the next 14 days. Should the patient develop any fevers, chills, bleeding, severe pain patient will contact the office immediately.
Cheek-To-Nose Interpolation Flap Text: A decision was made to reconstruct the defect utilizing an interpolation axial flap and a staged reconstruction.  A telfa template was made of the defect.  This telfa template was then used to outline the Cheek-To-Nose Interpolation flap.  The donor area for the pedicle flap was then injected with anesthesia.  The flap was excised through the skin and subcutaneous tissue down to the layer of the underlying musculature.  The interpolation flap was carefully excised within this deep plane to maintain its blood supply.  The edges of the donor site were undermined.   The donor site was closed in a primary fashion.  The pedicle was then rotated into position and sutured.  Once the tube was sutured into place, adequate blood supply was confirmed with blanching and refill.  The pedicle was then wrapped with xeroform gauze and dressed appropriately with a telfa and gauze bandage to ensure continued blood supply and protect the attached pedicle.
Referred To Mid-Level For Closure Text (Leave Blank If You Do Not Want): After obtaining clear surgical margins the patient was sent to a mid-level provider for surgical repair.  The patient understands they will receive post-surgical care and follow-up from the mid-level provider.
Previous Accession (Optional): 
Consent 2/Introductory Paragraph: Mohs surgery was explained to the patient and consent was obtained. The risks, benefits and alternatives to therapy were discussed in detail. Specifically, the risks of infection, scarring, bleeding, prolonged wound healing, incomplete removal, allergy to anesthesia, nerve injury and recurrence were addressed. Prior to the procedure, the treatment site was clearly identified and confirmed by the patient. All components of Universal Protocol/PAUSE Rule completed.
Surgeon: Dr. Trinidad
Consent (Ear)/Introductory Paragraph: The rationale for Mohs was explained to the patient and consent was obtained. The risks, benefits and alternatives to therapy were discussed in detail. Specifically, the risks of ear deformity, infection, scarring, bleeding, prolonged wound healing, incomplete removal, allergy to anesthesia, nerve injury and recurrence were addressed. Prior to the procedure, the treatment site was clearly identified and confirmed by the patient. All components of Universal Protocol/PAUSE Rule completed.
Consent (Near Eyelid Margin)/Introductory Paragraph: The rationale for Mohs was explained to the patient and consent was obtained. The risks, benefits and alternatives to therapy were discussed in detail. Specifically, the risks of ectropion or eyelid deformity, infection, scarring, bleeding, prolonged wound healing, incomplete removal, allergy to anesthesia, nerve injury and recurrence were addressed. Prior to the procedure, the treatment site was clearly identified and confirmed by the patient. All components of Universal Protocol/PAUSE Rule completed.
Anesthesia Volume In Cc: 6
Anesthesia Volume In Cc: 2
Body Location Override (Optional - Billing Will Still Be Based On Selected Body Map Location If Applicable): left nasal ala
Medical Necessity Statement: Based on my medical judgement, Mohs surgery is the most appropriate treatment for this cancer compared to other treatments.

## 2018-02-05 ENCOUNTER — APPOINTMENT (RX ONLY)
Dept: URBAN - METROPOLITAN AREA CLINIC 103 | Facility: CLINIC | Age: 68
Setting detail: DERMATOLOGY
End: 2018-02-05

## 2018-02-05 PROBLEM — C44.311 BASAL CELL CARCINOMA OF SKIN OF NOSE: Status: ACTIVE | Noted: 2018-02-05

## 2018-02-05 PROCEDURE — ? POST-OP WOUND CHECK

## 2018-02-05 NOTE — PROCEDURE: POST-OP WOUND CHECK
Additional Comments: Continue Vaseline until fully healed
Add 06972 Cpt? (Important Note: In 2017 The Use Of 72661 Is Being Tracked By Cms To Determine Future Global Period Reimbursement For Global Periods): no
Detail Level: Detailed

## 2018-08-27 ENCOUNTER — APPOINTMENT (RX ONLY)
Dept: URBAN - METROPOLITAN AREA CLINIC 103 | Facility: CLINIC | Age: 68
Setting detail: DERMATOLOGY
End: 2018-08-27

## 2018-08-27 DIAGNOSIS — L57.0 ACTINIC KERATOSIS: ICD-10-CM

## 2018-08-27 DIAGNOSIS — D485 NEOPLASM OF UNCERTAIN BEHAVIOR OF SKIN: ICD-10-CM

## 2018-08-27 DIAGNOSIS — L82.1 OTHER SEBORRHEIC KERATOSIS: ICD-10-CM

## 2018-08-27 DIAGNOSIS — L81.4 OTHER MELANIN HYPERPIGMENTATION: ICD-10-CM

## 2018-08-27 PROBLEM — D48.5 NEOPLASM OF UNCERTAIN BEHAVIOR OF SKIN: Status: ACTIVE | Noted: 2018-08-27

## 2018-08-27 PROCEDURE — ? BIOPSY BY SHAVE METHOD

## 2018-08-27 PROCEDURE — 11100: CPT | Mod: 59

## 2018-08-27 PROCEDURE — ? LIQUID NITROGEN

## 2018-08-27 PROCEDURE — 17000 DESTRUCT PREMALG LESION: CPT

## 2018-08-27 PROCEDURE — 11101: CPT

## 2018-08-27 PROCEDURE — 99214 OFFICE O/P EST MOD 30 MIN: CPT | Mod: 25

## 2018-08-27 ASSESSMENT — LOCATION DETAILED DESCRIPTION DERM
LOCATION DETAILED: RIGHT MID-UPPER BACK
LOCATION DETAILED: RIGHT INFERIOR POSTAURICULAR SKIN
LOCATION DETAILED: RIGHT DISTAL DORSAL FOREARM
LOCATION DETAILED: LEFT SUPERIOR FOREHEAD
LOCATION DETAILED: LEFT INFERIOR MEDIAL UPPER BACK

## 2018-08-27 ASSESSMENT — LOCATION ZONE DERM
LOCATION ZONE: FACE
LOCATION ZONE: ARM
LOCATION ZONE: SCALP
LOCATION ZONE: TRUNK

## 2018-08-27 ASSESSMENT — LOCATION SIMPLE DESCRIPTION DERM
LOCATION SIMPLE: LEFT UPPER BACK
LOCATION SIMPLE: RIGHT UPPER BACK
LOCATION SIMPLE: LEFT FOREHEAD
LOCATION SIMPLE: POSTERIOR SCALP
LOCATION SIMPLE: RIGHT FOREARM

## 2018-08-27 NOTE — PROCEDURE: BIOPSY BY SHAVE METHOD
Render Post-Care Instructions In Note?: no
Depth Of Biopsy: dermis
Type Of Destruction Used: Curettage
Biopsy Type: H and E
Was A Bandage Applied: Yes
X Size Of Lesion In Cm: 0
Notification Instructions: Patient will be notified of biopsy results. However, patient instructed to call the office if not contacted within 2 weeks.
Wound Care: Petrolatum
Hemostasis: Drysol
Curettage Text: The wound bed was treated with curettage after the biopsy was performed.
Size Of Lesion In Cm: 0.7
Biopsy Method: Acu-Razor
Electrodesiccation Text: The wound bed was treated with electrodesiccation after the biopsy was performed.
Post-Care Instructions: I reviewed with the patient in detail post-care instructions. Patient is to keep the biopsy site dry overnight, and then apply bacitracin twice daily until healed. Patient may apply hydrogen peroxide soaks to remove any crusting.
Detail Level: Detailed
Billing Type: Third-Party Bill
Cryotherapy Text: The wound bed was treated with cryotherapy after the biopsy was performed.
Electrodesiccation And Curettage Text: The wound bed was treated with electrodesiccation and curettage after the biopsy was performed.
Anesthesia Volume In Cc: 0.5
Anesthesia Type: 1% lidocaine with epinephrine
Consent: Written consent was obtained and risks were reviewed including but not limited to scarring, infection, bleeding, scabbing, incomplete removal, nerve damage and allergy to anesthesia.
Silver Nitrate Text: The wound bed was treated with silver nitrate after the biopsy was performed.
Path Notes (To The Dermatopathologist): 5mm
Dressing: bandage
Path Notes (To The Dermatopathologist): 7mm

## 2018-08-27 NOTE — PROCEDURE: LIQUID NITROGEN
Duration Of Freeze Thaw-Cycle (Seconds): 0
Consent: The patient's consent was obtained including but not limited to risks of crusting, scabbing, blistering, scarring, darker or lighter pigmentary change, recurrence, incomplete removal and infection.
Number Of Freeze-Thaw Cycles: 2 freeze-thaw cycles
Render Post-Care Instructions In Note?: no
Post-Care Instructions: I reviewed with the patient in detail post-care instructions. Patient is to wear sunprotection, and avoid picking at any of the treated lesions. Pt may apply Vaseline to crusted or scabbing areas.
Detail Level: Detailed

## 2019-06-20 ENCOUNTER — APPOINTMENT (RX ONLY)
Dept: URBAN - METROPOLITAN AREA CLINIC 103 | Facility: CLINIC | Age: 69
Setting detail: DERMATOLOGY
End: 2019-06-20

## 2019-06-20 DIAGNOSIS — L57.0 ACTINIC KERATOSIS: ICD-10-CM

## 2019-06-20 DIAGNOSIS — L82.1 OTHER SEBORRHEIC KERATOSIS: ICD-10-CM

## 2019-06-20 DIAGNOSIS — L57.8 OTHER SKIN CHANGES DUE TO CHRONIC EXPOSURE TO NONIONIZING RADIATION: ICD-10-CM

## 2019-06-20 DIAGNOSIS — D18.0 HEMANGIOMA: ICD-10-CM

## 2019-06-20 DIAGNOSIS — L91.8 OTHER HYPERTROPHIC DISORDERS OF THE SKIN: ICD-10-CM

## 2019-06-20 DIAGNOSIS — Z85.828 PERSONAL HISTORY OF OTHER MALIGNANT NEOPLASM OF SKIN: ICD-10-CM

## 2019-06-20 DIAGNOSIS — D485 NEOPLASM OF UNCERTAIN BEHAVIOR OF SKIN: ICD-10-CM

## 2019-06-20 DIAGNOSIS — L81.4 OTHER MELANIN HYPERPIGMENTATION: ICD-10-CM

## 2019-06-20 PROBLEM — D18.01 HEMANGIOMA OF SKIN AND SUBCUTANEOUS TISSUE: Status: ACTIVE | Noted: 2019-06-20

## 2019-06-20 PROBLEM — D48.5 NEOPLASM OF UNCERTAIN BEHAVIOR OF SKIN: Status: ACTIVE | Noted: 2019-06-20

## 2019-06-20 PROCEDURE — ? LIQUID NITROGEN

## 2019-06-20 PROCEDURE — 17000 DESTRUCT PREMALG LESION: CPT | Mod: 59

## 2019-06-20 PROCEDURE — 99214 OFFICE O/P EST MOD 30 MIN: CPT | Mod: 25

## 2019-06-20 PROCEDURE — 11103 TANGNTL BX SKIN EA SEP/ADDL: CPT

## 2019-06-20 PROCEDURE — ? COUNSELING

## 2019-06-20 PROCEDURE — ? BIOPSY BY SHAVE METHOD

## 2019-06-20 PROCEDURE — 17003 DESTRUCT PREMALG LES 2-14: CPT

## 2019-06-20 PROCEDURE — 11102 TANGNTL BX SKIN SINGLE LES: CPT

## 2019-06-20 ASSESSMENT — LOCATION SIMPLE DESCRIPTION DERM
LOCATION SIMPLE: RIGHT PRETIBIAL REGION
LOCATION SIMPLE: LEFT PRETIBIAL REGION
LOCATION SIMPLE: RIGHT SHOULDER
LOCATION SIMPLE: RIGHT CHEEK
LOCATION SIMPLE: CHEST
LOCATION SIMPLE: RIGHT FOREHEAD
LOCATION SIMPLE: LEFT UPPER ARM
LOCATION SIMPLE: UPPER BACK
LOCATION SIMPLE: RIGHT UPPER BACK
LOCATION SIMPLE: CHIN
LOCATION SIMPLE: LEFT UPPER BACK
LOCATION SIMPLE: LEFT SHOULDER
LOCATION SIMPLE: ABDOMEN
LOCATION SIMPLE: SUPERIOR FOREHEAD
LOCATION SIMPLE: LEFT HAND
LOCATION SIMPLE: RIGHT EAR
LOCATION SIMPLE: RIGHT HAND
LOCATION SIMPLE: LEFT CHEEK
LOCATION SIMPLE: POSTERIOR NECK
LOCATION SIMPLE: RIGHT UPPER ARM
LOCATION SIMPLE: LEFT FOREHEAD

## 2019-06-20 ASSESSMENT — LOCATION DETAILED DESCRIPTION DERM
LOCATION DETAILED: STERNUM
LOCATION DETAILED: RIGHT SUPERIOR LATERAL MALAR CHEEK
LOCATION DETAILED: LEFT SUPERIOR LATERAL MALAR CHEEK
LOCATION DETAILED: RIGHT LATERAL FOREHEAD
LOCATION DETAILED: LEFT PROXIMAL POSTERIOR UPPER ARM
LOCATION DETAILED: LEFT INFERIOR LATERAL FOREHEAD
LOCATION DETAILED: INFERIOR THORACIC SPINE
LOCATION DETAILED: RIGHT ANTERIOR PROXIMAL UPPER ARM
LOCATION DETAILED: RIGHT DISTAL POSTERIOR UPPER ARM
LOCATION DETAILED: LEFT CENTRAL MALAR CHEEK
LOCATION DETAILED: RIGHT PROXIMAL PRETIBIAL REGION
LOCATION DETAILED: RIGHT SUPERIOR UPPER BACK
LOCATION DETAILED: MID POSTERIOR NECK
LOCATION DETAILED: LEFT MID-UPPER BACK
LOCATION DETAILED: SUPERIOR MID FOREHEAD
LOCATION DETAILED: LEFT PROXIMAL PRETIBIAL REGION
LOCATION DETAILED: RIGHT LATERAL MALAR CHEEK
LOCATION DETAILED: RIGHT PROXIMAL POSTERIOR UPPER ARM
LOCATION DETAILED: PERIUMBILICAL SKIN
LOCATION DETAILED: LEFT INFERIOR UPPER BACK
LOCATION DETAILED: LEFT DISTAL POSTERIOR UPPER ARM
LOCATION DETAILED: RIGHT RADIAL DORSAL HAND
LOCATION DETAILED: LEFT ULNAR DORSAL HAND
LOCATION DETAILED: EPIGASTRIC SKIN
LOCATION DETAILED: LEFT POSTERIOR SHOULDER
LOCATION DETAILED: RIGHT POSTERIOR SHOULDER
LOCATION DETAILED: LEFT CHIN
LOCATION DETAILED: RIGHT SUPERIOR HELIX
LOCATION DETAILED: LEFT ANTERIOR PROXIMAL UPPER ARM

## 2019-06-20 ASSESSMENT — LOCATION ZONE DERM
LOCATION ZONE: LEG
LOCATION ZONE: ARM
LOCATION ZONE: FACE
LOCATION ZONE: EAR
LOCATION ZONE: NECK
LOCATION ZONE: TRUNK
LOCATION ZONE: HAND

## 2019-06-20 NOTE — PROCEDURE: BIOPSY BY SHAVE METHOD
Hemostasis: Drysol
Billing Type: Third-Party Bill
Electrodesiccation Text: The wound bed was treated with electrodesiccation after the biopsy was performed.
Render In Bullet Format When Appropriate: No
Curettage Text: The wound bed was treated with curettage after the biopsy was performed.
Biopsy Type: H and E
Anesthesia Volume In Cc: 0.5
Cryotherapy Text: The wound bed was treated with cryotherapy after the biopsy was performed.
Type Of Destruction Used: Curettage
Was A Bandage Applied: Yes
Post-Care Instructions: I reviewed with the patient in detail post-care instructions. Patient is to keep the biopsy site dry overnight, and then apply bacitracin twice daily until healed. Patient may apply hydrogen peroxide soaks to remove any crusting.
Electrodesiccation And Curettage Text: The wound bed was treated with electrodesiccation and curettage after the biopsy was performed.
Detail Level: Detailed
Consent: Written consent was obtained and risks were reviewed including but not limited to scarring, infection, bleeding, scabbing, incomplete removal, nerve damage and allergy to anesthesia.
Silver Nitrate Text: The wound bed was treated with silver nitrate after the biopsy was performed.
Notification Instructions: Patient will be notified of biopsy results. However, patient instructed to call the office if not contacted within 2 weeks.
Depth Of Biopsy: dermis
Biopsy Method: Dermablade
Additional Anesthesia Volume In Cc (Will Not Render If 0): 0
Size Of Lesion In Cm: 1.1
Dressing: bandage
Anesthesia Type: 1% lidocaine with epinephrine
Path Notes (To The Dermatopathologist): 3mm
Wound Care: Petrolatum
Size Of Lesion In Cm: 0.3
Path Notes (To The Dermatopathologist): 11mm

## 2019-07-11 ENCOUNTER — APPOINTMENT (RX ONLY)
Dept: URBAN - METROPOLITAN AREA CLINIC 103 | Facility: CLINIC | Age: 69
Setting detail: DERMATOLOGY
End: 2019-07-11

## 2019-07-11 PROBLEM — C44.619 BASAL CELL CARCINOMA OF SKIN OF LEFT UPPER LIMB, INCLUDING SHOULDER: Status: ACTIVE | Noted: 2019-07-11

## 2019-07-11 PROCEDURE — 17260 DSTRJ MAL LES T/A/L 0.5 CM/<: CPT

## 2019-07-11 PROCEDURE — ? CURETTAGE AND DESTRUCTION

## 2019-07-11 NOTE — PROCEDURE: CURETTAGE AND DESTRUCTION
Detail Level: Detailed
Bill For Surgical Tray: no
Size Of Lesion In Cm: 0.3
Biopsy Photograph Reviewed: Yes
Additional Information: (Optional): The wound was cleaned, and a pressure dressing was applied.  The patient received detailed post-op instructions.
Total Volume (Ccs): 1
Cautery Type: electrodesiccation
Size Of Lesion After Curettage: 0.5
What Was Performed First?: Curettage
Consent was obtained from the patient. The risks, benefits and alternatives to therapy were discussed in detail. Specifically, the risks of infection, scarring, bleeding, prolonged wound healing, nerve injury, incomplete removal, allergy to anesthesia and recurrence were addressed. Alternatives to ED&C, such as: surgical removal and XRT were also discussed.  Prior to the procedure, the treatment site was clearly identified and confirmed by the patient. All components of Universal Protocol/PAUSE Rule completed.
Anesthesia Type: 1% lidocaine with epinephrine
Bill As A Line Item Or As Units: Line Item
Post-Care Instructions: I reviewed with the patient in detail post-care instructions. Patient is to keep the area dry for 48 hours, and not to engage in any swimming until the area is healed. Should the patient develop any fevers, chills, bleeding, severe pain patient will contact the office immediately.
Number Of Curettages: 3

## 2019-12-20 ENCOUNTER — APPOINTMENT (RX ONLY)
Dept: URBAN - METROPOLITAN AREA CLINIC 103 | Facility: CLINIC | Age: 69
Setting detail: DERMATOLOGY
End: 2019-12-20

## 2019-12-20 DIAGNOSIS — L73.8 OTHER SPECIFIED FOLLICULAR DISORDERS: ICD-10-CM

## 2019-12-20 DIAGNOSIS — Z85.828 PERSONAL HISTORY OF OTHER MALIGNANT NEOPLASM OF SKIN: ICD-10-CM

## 2019-12-20 DIAGNOSIS — L57.8 OTHER SKIN CHANGES DUE TO CHRONIC EXPOSURE TO NONIONIZING RADIATION: ICD-10-CM

## 2019-12-20 DIAGNOSIS — L82.1 OTHER SEBORRHEIC KERATOSIS: ICD-10-CM

## 2019-12-20 DIAGNOSIS — D18.0 HEMANGIOMA: ICD-10-CM

## 2019-12-20 DIAGNOSIS — L81.4 OTHER MELANIN HYPERPIGMENTATION: ICD-10-CM

## 2019-12-20 PROBLEM — D18.01 HEMANGIOMA OF SKIN AND SUBCUTANEOUS TISSUE: Status: ACTIVE | Noted: 2019-12-20

## 2019-12-20 PROCEDURE — 99214 OFFICE O/P EST MOD 30 MIN: CPT

## 2019-12-20 PROCEDURE — ? COUNSELING

## 2019-12-20 ASSESSMENT — LOCATION ZONE DERM
LOCATION ZONE: ARM
LOCATION ZONE: NECK
LOCATION ZONE: TRUNK
LOCATION ZONE: SCALP
LOCATION ZONE: FACE

## 2019-12-20 ASSESSMENT — LOCATION DETAILED DESCRIPTION DERM
LOCATION DETAILED: LEFT ANTERIOR PROXIMAL UPPER ARM
LOCATION DETAILED: RIGHT INFERIOR CENTRAL MALAR CHEEK
LOCATION DETAILED: POSTERIOR MID-PARIETAL SCALP
LOCATION DETAILED: RIGHT SUPERIOR UPPER BACK
LOCATION DETAILED: STERNUM
LOCATION DETAILED: LEFT PROXIMAL POSTERIOR UPPER ARM
LOCATION DETAILED: RIGHT PROXIMAL POSTERIOR UPPER ARM
LOCATION DETAILED: RIGHT DISTAL RADIAL DORSAL FOREARM
LOCATION DETAILED: LEFT VENTRAL DISTAL FOREARM
LOCATION DETAILED: RIGHT VENTRAL PROXIMAL FOREARM
LOCATION DETAILED: RIGHT MEDIAL FOREHEAD
LOCATION DETAILED: EPIGASTRIC SKIN
LOCATION DETAILED: RIGHT SUPERIOR MEDIAL MIDBACK
LOCATION DETAILED: LEFT CENTRAL MALAR CHEEK
LOCATION DETAILED: LEFT DISTAL DORSAL FOREARM
LOCATION DETAILED: MID POSTERIOR NECK
LOCATION DETAILED: RIGHT ANTERIOR PROXIMAL UPPER ARM
LOCATION DETAILED: INFERIOR THORACIC SPINE
LOCATION DETAILED: LEFT MEDIAL FOREHEAD

## 2019-12-20 ASSESSMENT — LOCATION SIMPLE DESCRIPTION DERM
LOCATION SIMPLE: RIGHT UPPER BACK
LOCATION SIMPLE: LEFT UPPER ARM
LOCATION SIMPLE: RIGHT FOREHEAD
LOCATION SIMPLE: POSTERIOR NECK
LOCATION SIMPLE: RIGHT CHEEK
LOCATION SIMPLE: RIGHT FOREARM
LOCATION SIMPLE: LEFT CHEEK
LOCATION SIMPLE: UPPER BACK
LOCATION SIMPLE: LEFT FOREARM
LOCATION SIMPLE: POSTERIOR SCALP
LOCATION SIMPLE: ABDOMEN
LOCATION SIMPLE: RIGHT LOWER BACK
LOCATION SIMPLE: RIGHT UPPER ARM
LOCATION SIMPLE: LEFT FOREHEAD
LOCATION SIMPLE: CHEST

## 2022-04-18 ENCOUNTER — APPOINTMENT (RX ONLY)
Dept: URBAN - METROPOLITAN AREA CLINIC 102 | Facility: CLINIC | Age: 72
Setting detail: DERMATOLOGY
End: 2022-04-18

## 2022-04-18 DIAGNOSIS — L57.0 ACTINIC KERATOSIS: ICD-10-CM

## 2022-04-18 DIAGNOSIS — L72.0 EPIDERMAL CYST: ICD-10-CM

## 2022-04-18 DIAGNOSIS — L82.1 OTHER SEBORRHEIC KERATOSIS: ICD-10-CM

## 2022-04-18 DIAGNOSIS — D18.0 HEMANGIOMA: ICD-10-CM

## 2022-04-18 PROBLEM — D18.01 HEMANGIOMA OF SKIN AND SUBCUTANEOUS TISSUE: Status: ACTIVE | Noted: 2022-04-18

## 2022-04-18 PROCEDURE — 17000 DESTRUCT PREMALG LESION: CPT

## 2022-04-18 PROCEDURE — ? LIQUID NITROGEN

## 2022-04-18 PROCEDURE — ? COUNSELING

## 2022-04-18 PROCEDURE — ? EDUCATIONAL RESOURCES PROVIDED

## 2022-04-18 PROCEDURE — 99213 OFFICE O/P EST LOW 20 MIN: CPT | Mod: 25

## 2022-04-18 PROCEDURE — 17003 DESTRUCT PREMALG LES 2-14: CPT

## 2022-04-18 ASSESSMENT — LOCATION ZONE DERM
LOCATION ZONE: EAR
LOCATION ZONE: ARM
LOCATION ZONE: TRUNK
LOCATION ZONE: NOSE

## 2022-04-18 ASSESSMENT — LOCATION DETAILED DESCRIPTION DERM
LOCATION DETAILED: LEFT PROXIMAL POSTERIOR UPPER ARM
LOCATION DETAILED: LEFT SUPERIOR POSTERIOR HELIX
LOCATION DETAILED: LEFT POSTERIOR SHOULDER
LOCATION DETAILED: RIGHT PROXIMAL POSTERIOR UPPER ARM
LOCATION DETAILED: LEFT MID-UPPER BACK
LOCATION DETAILED: PERIUMBILICAL SKIN
LOCATION DETAILED: NASAL DORSUM

## 2022-04-18 ASSESSMENT — LOCATION SIMPLE DESCRIPTION DERM
LOCATION SIMPLE: RIGHT UPPER ARM
LOCATION SIMPLE: LEFT EAR
LOCATION SIMPLE: ABDOMEN
LOCATION SIMPLE: NOSE
LOCATION SIMPLE: LEFT UPPER BACK
LOCATION SIMPLE: LEFT SHOULDER
LOCATION SIMPLE: LEFT UPPER ARM

## 2023-04-24 ENCOUNTER — APPOINTMENT (RX ONLY)
Dept: URBAN - METROPOLITAN AREA CLINIC 102 | Facility: CLINIC | Age: 73
Setting detail: DERMATOLOGY
End: 2023-04-24

## 2023-04-24 DIAGNOSIS — Z85.828 PERSONAL HISTORY OF OTHER MALIGNANT NEOPLASM OF SKIN: ICD-10-CM

## 2023-04-24 DIAGNOSIS — L57.0 ACTINIC KERATOSIS: ICD-10-CM

## 2023-04-24 DIAGNOSIS — L81.4 OTHER MELANIN HYPERPIGMENTATION: ICD-10-CM

## 2023-04-24 DIAGNOSIS — D485 NEOPLASM OF UNCERTAIN BEHAVIOR OF SKIN: ICD-10-CM

## 2023-04-24 DIAGNOSIS — L82.1 OTHER SEBORRHEIC KERATOSIS: ICD-10-CM

## 2023-04-24 PROBLEM — D48.5 NEOPLASM OF UNCERTAIN BEHAVIOR OF SKIN: Status: ACTIVE | Noted: 2023-04-24

## 2023-04-24 PROCEDURE — 99213 OFFICE O/P EST LOW 20 MIN: CPT | Mod: 25

## 2023-04-24 PROCEDURE — 11102 TANGNTL BX SKIN SINGLE LES: CPT

## 2023-04-24 PROCEDURE — ? BIOPSY BY SHAVE METHOD

## 2023-04-24 PROCEDURE — ? COUNSELING

## 2023-04-24 PROCEDURE — ? LIQUID NITROGEN

## 2023-04-24 PROCEDURE — 17000 DESTRUCT PREMALG LESION: CPT | Mod: 59

## 2023-04-24 PROCEDURE — 17003 DESTRUCT PREMALG LES 2-14: CPT

## 2023-04-24 ASSESSMENT — LOCATION SIMPLE DESCRIPTION DERM
LOCATION SIMPLE: RIGHT HAND
LOCATION SIMPLE: ABDOMEN
LOCATION SIMPLE: CHEST
LOCATION SIMPLE: LEFT UPPER ARM
LOCATION SIMPLE: LEFT HAND
LOCATION SIMPLE: RIGHT UPPER ARM
LOCATION SIMPLE: LEFT SHOULDER
LOCATION SIMPLE: SCALP
LOCATION SIMPLE: RIGHT ZYGOMA
LOCATION SIMPLE: LEFT UPPER BACK
LOCATION SIMPLE: RIGHT FOREARM

## 2023-04-24 ASSESSMENT — LOCATION ZONE DERM
LOCATION ZONE: SCALP
LOCATION ZONE: HAND
LOCATION ZONE: FACE
LOCATION ZONE: TRUNK
LOCATION ZONE: ARM

## 2023-04-24 ASSESSMENT — LOCATION DETAILED DESCRIPTION DERM
LOCATION DETAILED: RIGHT PROXIMAL POSTERIOR UPPER ARM
LOCATION DETAILED: RIGHT CENTRAL ZYGOMA
LOCATION DETAILED: RIGHT RADIAL DORSAL HAND
LOCATION DETAILED: LEFT INFERIOR POSTAURICULAR SKIN
LOCATION DETAILED: EPIGASTRIC SKIN
LOCATION DETAILED: LEFT POSTERIOR SHOULDER
LOCATION DETAILED: LEFT ULNAR DORSAL HAND
LOCATION DETAILED: LEFT PROXIMAL POSTERIOR UPPER ARM
LOCATION DETAILED: RIGHT VENTRAL LATERAL DISTAL FOREARM
LOCATION DETAILED: RIGHT MEDIAL SUPERIOR CHEST
LOCATION DETAILED: LEFT MID-UPPER BACK

## 2023-04-24 NOTE — PROCEDURE: BIOPSY BY SHAVE METHOD
Detail Level: Detailed
Depth Of Biopsy: dermis
Was A Bandage Applied: Yes
Size Of Lesion In Cm: 0.7
X Size Of Lesion In Cm: 0
Biopsy Type: H and E
Biopsy Method: Personna blade
Anesthesia Type: 1% lidocaine with epinephrine and a 1:10 solution of 8.4% sodium bicarbonate
Anesthesia Volume In Cc: 0.5
Hemostasis: Drysol
Wound Care: Petrolatum
Dressing: bandage
Destruction After The Procedure: No
Type Of Destruction Used: Curettage
Curettage Text: The wound bed was treated with curettage after the biopsy was performed.
Cryotherapy Text: The wound bed was treated with cryotherapy after the biopsy was performed.
Electrodesiccation Text: The wound bed was treated with electrodesiccation after the biopsy was performed.
Electrodesiccation And Curettage Text: The wound bed was treated with electrodesiccation and curettage after the biopsy was performed.
Silver Nitrate Text: The wound bed was treated with silver nitrate after the biopsy was performed.
Lab: [id number]
Lab Facility: [id number]
Path Notes (To The Dermatopathologist): 7mm
Consent: Written consent was obtained and risks were reviewed including but not limited to scarring, infection, bleeding, scabbing, incomplete removal, nerve damage and allergy to anesthesia.
Post-Care Instructions: I reviewed with the patient in detail post-care instructions. Patient is to keep the biopsy site dry overnight, wash with soap and water, rinse, pat dry, apply petrolatum daily, and cover with a bandage until healed.
Notification Instructions: Patient will be notified of biopsy results. However, patient instructed to call the office if not contacted within 2 weeks.
Billing Type: Third-Party Bill
Information: Selecting Yes will display possible errors in your note based on the variables you have selected. This validation is only offered as a suggestion for you. PLEASE NOTE THAT THE VALIDATION TEXT WILL BE REMOVED WHEN YOU FINALIZE YOUR NOTE. IF YOU WANT TO FAX A PRELIMINARY NOTE YOU WILL NEED TO TOGGLE THIS TO 'NO' IF YOU DO NOT WANT IT IN YOUR FAXED NOTE.

## 2023-04-24 NOTE — PROCEDURE: LIQUID NITROGEN
Detail Level: Detailed
Show Aperture Variable?: Yes
Consent: The patient's consent was obtained including but not limited to risks of crusting, scabbing, blistering, scarring, darker or lighter pigmentary change, recurrence, incomplete removal and infection.
Duration Of Freeze Thaw-Cycle (Seconds): 3
Post-Care Instructions: I reviewed with the patient in detail post-care instructions. Patient is to wear sunprotection, and avoid picking at any of the treated lesions. Pt may apply Vaseline to crusted or scabbing areas.
Render Post-Care Instructions In Note?: no
Number Of Freeze-Thaw Cycles: 2 freeze-thaw cycles

## 2023-10-30 ENCOUNTER — APPOINTMENT (RX ONLY)
Dept: URBAN - METROPOLITAN AREA CLINIC 102 | Facility: CLINIC | Age: 73
Setting detail: DERMATOLOGY
End: 2023-10-30

## 2023-10-30 DIAGNOSIS — L81.4 OTHER MELANIN HYPERPIGMENTATION: ICD-10-CM

## 2023-10-30 DIAGNOSIS — D485 NEOPLASM OF UNCERTAIN BEHAVIOR OF SKIN: ICD-10-CM

## 2023-10-30 DIAGNOSIS — Z85.828 PERSONAL HISTORY OF OTHER MALIGNANT NEOPLASM OF SKIN: ICD-10-CM

## 2023-10-30 DIAGNOSIS — L82.1 OTHER SEBORRHEIC KERATOSIS: ICD-10-CM

## 2023-10-30 PROBLEM — D48.5 NEOPLASM OF UNCERTAIN BEHAVIOR OF SKIN: Status: ACTIVE | Noted: 2023-10-30

## 2023-10-30 PROCEDURE — ? COUNSELING

## 2023-10-30 PROCEDURE — 11102 TANGNTL BX SKIN SINGLE LES: CPT

## 2023-10-30 PROCEDURE — ? BIOPSY BY SHAVE METHOD

## 2023-10-30 PROCEDURE — 99213 OFFICE O/P EST LOW 20 MIN: CPT | Mod: 25

## 2023-10-30 ASSESSMENT — LOCATION DETAILED DESCRIPTION DERM
LOCATION DETAILED: RIGHT PROXIMAL POSTERIOR UPPER ARM
LOCATION DETAILED: LEFT MID-UPPER BACK
LOCATION DETAILED: LEFT SUPERIOR CENTRAL MALAR CHEEK
LOCATION DETAILED: RIGHT MEDIAL SUPERIOR CHEST
LOCATION DETAILED: RIGHT RADIAL DORSAL HAND
LOCATION DETAILED: EPIGASTRIC SKIN
LOCATION DETAILED: LEFT POSTERIOR SHOULDER
LOCATION DETAILED: LEFT PROXIMAL POSTERIOR UPPER ARM
LOCATION DETAILED: LEFT ULNAR DORSAL HAND

## 2023-10-30 ASSESSMENT — LOCATION SIMPLE DESCRIPTION DERM
LOCATION SIMPLE: RIGHT UPPER ARM
LOCATION SIMPLE: CHEST
LOCATION SIMPLE: LEFT CHEEK
LOCATION SIMPLE: LEFT UPPER BACK
LOCATION SIMPLE: LEFT UPPER ARM
LOCATION SIMPLE: ABDOMEN
LOCATION SIMPLE: LEFT SHOULDER
LOCATION SIMPLE: LEFT HAND
LOCATION SIMPLE: RIGHT HAND

## 2023-10-30 ASSESSMENT — LOCATION ZONE DERM
LOCATION ZONE: TRUNK
LOCATION ZONE: FACE
LOCATION ZONE: ARM
LOCATION ZONE: HAND

## 2023-10-30 NOTE — PROCEDURE: BIOPSY BY SHAVE METHOD
Detail Level: Detailed
Depth Of Biopsy: dermis
Was A Bandage Applied: Yes
Size Of Lesion In Cm: 0.6
X Size Of Lesion In Cm: 0
Biopsy Type: H and E
Biopsy Method: Personna blade
Anesthesia Type: 1% lidocaine with epinephrine and a 1:10 solution of 8.4% sodium bicarbonate
Anesthesia Volume In Cc: 0.5
Hemostasis: Drysol
Wound Care: Petrolatum
Dressing: bandage
Destruction After The Procedure: No
Type Of Destruction Used: Curettage
Curettage Text: The wound bed was treated with curettage after the biopsy was performed.
Cryotherapy Text: The wound bed was treated with cryotherapy after the biopsy was performed.
Electrodesiccation Text: The wound bed was treated with electrodesiccation after the biopsy was performed.
Electrodesiccation And Curettage Text: The wound bed was treated with electrodesiccation and curettage after the biopsy was performed.
Silver Nitrate Text: The wound bed was treated with silver nitrate after the biopsy was performed.
Lab: [id number]
Lab Facility: [id number]
Path Notes (To The Dermatopathologist): 6mm
Consent: Written consent was obtained and risks were reviewed including but not limited to scarring, infection, bleeding, scabbing, incomplete removal, nerve damage and allergy to anesthesia.
Post-Care Instructions: I reviewed with the patient in detail post-care instructions. Patient is to keep the biopsy site dry overnight, wash with soap and water, rinse, pat dry, apply petrolatum daily, and cover with a bandage until healed.
Notification Instructions: Patient will be notified of biopsy results. However, patient instructed to call the office if not contacted within 2 weeks.
Billing Type: Third-Party Bill
Information: Selecting Yes will display possible errors in your note based on the variables you have selected. This validation is only offered as a suggestion for you. PLEASE NOTE THAT THE VALIDATION TEXT WILL BE REMOVED WHEN YOU FINALIZE YOUR NOTE. IF YOU WANT TO FAX A PRELIMINARY NOTE YOU WILL NEED TO TOGGLE THIS TO 'NO' IF YOU DO NOT WANT IT IN YOUR FAXED NOTE.

## 2024-05-06 ENCOUNTER — APPOINTMENT (RX ONLY)
Dept: URBAN - METROPOLITAN AREA CLINIC 102 | Facility: CLINIC | Age: 74
Setting detail: DERMATOLOGY
End: 2024-05-06

## 2024-05-06 DIAGNOSIS — L72.8 OTHER FOLLICULAR CYSTS OF THE SKIN AND SUBCUTANEOUS TISSUE: ICD-10-CM

## 2024-05-06 DIAGNOSIS — Z85.828 PERSONAL HISTORY OF OTHER MALIGNANT NEOPLASM OF SKIN: ICD-10-CM

## 2024-05-06 DIAGNOSIS — L81.4 OTHER MELANIN HYPERPIGMENTATION: ICD-10-CM

## 2024-05-06 DIAGNOSIS — L82.1 OTHER SEBORRHEIC KERATOSIS: ICD-10-CM

## 2024-05-06 PROCEDURE — 99213 OFFICE O/P EST LOW 20 MIN: CPT

## 2024-05-06 PROCEDURE — ? TREATMENT REGIMEN

## 2024-05-06 PROCEDURE — ? CONSULTATION EXCISION

## 2024-05-06 PROCEDURE — ? COUNSELING

## 2024-05-06 ASSESSMENT — LOCATION SIMPLE DESCRIPTION DERM
LOCATION SIMPLE: ABDOMEN
LOCATION SIMPLE: LEFT UPPER BACK
LOCATION SIMPLE: LEFT HAND
LOCATION SIMPLE: LEFT UPPER ARM
LOCATION SIMPLE: RIGHT UPPER ARM
LOCATION SIMPLE: RIGHT HAND
LOCATION SIMPLE: LEFT SHOULDER
LOCATION SIMPLE: CHEST

## 2024-05-06 ASSESSMENT — LOCATION DETAILED DESCRIPTION DERM
LOCATION DETAILED: LEFT PROXIMAL POSTERIOR UPPER ARM
LOCATION DETAILED: EPIGASTRIC SKIN
LOCATION DETAILED: LEFT POSTERIOR SHOULDER
LOCATION DETAILED: LEFT ULNAR DORSAL HAND
LOCATION DETAILED: RIGHT RADIAL DORSAL HAND
LOCATION DETAILED: LEFT MID-UPPER BACK
LOCATION DETAILED: RIGHT MEDIAL SUPERIOR CHEST
LOCATION DETAILED: RIGHT PROXIMAL POSTERIOR UPPER ARM

## 2024-05-06 ASSESSMENT — LOCATION ZONE DERM
LOCATION ZONE: TRUNK
LOCATION ZONE: ARM
LOCATION ZONE: HAND

## 2024-11-13 ENCOUNTER — APPOINTMENT (OUTPATIENT)
Dept: CARDIOLOGY | Facility: HOSPITAL | Age: 74
DRG: 555 | End: 2024-11-13
Payer: OTHER GOVERNMENT

## 2024-11-13 ENCOUNTER — APPOINTMENT (OUTPATIENT)
Dept: RADIOLOGY | Facility: HOSPITAL | Age: 74
DRG: 555 | End: 2024-11-13
Payer: OTHER GOVERNMENT

## 2024-11-13 ENCOUNTER — HOSPITAL ENCOUNTER (INPATIENT)
Facility: HOSPITAL | Age: 74
LOS: 2 days | Discharge: HOME HEALTH | DRG: 537 | End: 2024-11-16
Attending: EMERGENCY MEDICINE | Admitting: INTERNAL MEDICINE
Payer: OTHER GOVERNMENT
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
## 2024-11-13 DIAGNOSIS — J44.1 COPD WITH ACUTE EXACERBATION (MULTI): Primary | ICD-10-CM

## 2024-11-13 DIAGNOSIS — M25.552 PAIN OF LEFT HIP: ICD-10-CM

## 2024-11-13 DIAGNOSIS — J44.1 COPD EXACERBATION (MULTI): ICD-10-CM

## 2024-11-13 DIAGNOSIS — R53.1 WEAKNESS: ICD-10-CM

## 2024-11-13 DIAGNOSIS — R09.02 HYPOXIA: ICD-10-CM

## 2024-11-13 LAB
ALBUMIN SERPL BCP-MCNC: 3.7 G/DL (ref 3.4–5)
ALP SERPL-CCNC: 85 U/L (ref 33–136)
ALT SERPL W P-5'-P-CCNC: 54 U/L (ref 10–52)
AMORPH CRY #/AREA UR COMP ASSIST: ABNORMAL /HPF
ANION GAP BLDV CALCULATED.4IONS-SCNC: 10 MMOL/L (ref 10–25)
ANION GAP SERPL CALC-SCNC: 14 MMOL/L (ref 10–20)
APPEARANCE UR: ABNORMAL
AST SERPL W P-5'-P-CCNC: 147 U/L (ref 9–39)
ATRIAL RATE: 94 BPM
BASE EXCESS BLDV CALC-SCNC: 4.9 MMOL/L (ref -2–3)
BASOPHILS # BLD AUTO: 0.02 X10*3/UL (ref 0–0.1)
BASOPHILS NFR BLD AUTO: 0.2 %
BILIRUB SERPL-MCNC: 1.2 MG/DL (ref 0–1.2)
BILIRUB UR STRIP.AUTO-MCNC: NEGATIVE MG/DL
BNP SERPL-MCNC: 169 PG/ML (ref 0–99)
BODY TEMPERATURE: ABNORMAL
BUN SERPL-MCNC: 20 MG/DL (ref 6–23)
CA-I BLDV-SCNC: 1.15 MMOL/L (ref 1.1–1.33)
CALCIUM SERPL-MCNC: 9.2 MG/DL (ref 8.6–10.3)
CARDIAC TROPONIN I PNL SERPL HS: 37 NG/L (ref 0–20)
CARDIAC TROPONIN I PNL SERPL HS: 39 NG/L (ref 0–20)
CHLORIDE BLDV-SCNC: 99 MMOL/L (ref 98–107)
CHLORIDE SERPL-SCNC: 99 MMOL/L (ref 98–107)
CO2 SERPL-SCNC: 29 MMOL/L (ref 21–32)
COLOR UR: YELLOW
CREAT SERPL-MCNC: 1.07 MG/DL (ref 0.5–1.3)
EGFRCR SERPLBLD CKD-EPI 2021: 72 ML/MIN/1.73M*2
EOSINOPHIL # BLD AUTO: 0.01 X10*3/UL (ref 0–0.4)
EOSINOPHIL NFR BLD AUTO: 0.1 %
ERYTHROCYTE [DISTWIDTH] IN BLOOD BY AUTOMATED COUNT: 12.2 % (ref 11.5–14.5)
GLUCOSE BLDV-MCNC: 127 MG/DL (ref 74–99)
GLUCOSE SERPL-MCNC: 136 MG/DL (ref 74–99)
GLUCOSE UR STRIP.AUTO-MCNC: NORMAL MG/DL
GRAN CASTS #/AREA UR COMP ASSIST: ABNORMAL /LPF
HCO3 BLDV-SCNC: 30.5 MMOL/L (ref 22–26)
HCT VFR BLD AUTO: 46.2 % (ref 41–52)
HCT VFR BLD EST: 47 % (ref 41–52)
HGB BLD-MCNC: 15.5 G/DL (ref 13.5–17.5)
HGB BLDV-MCNC: 15.5 G/DL (ref 13.5–17.5)
HYALINE CASTS #/AREA URNS AUTO: ABNORMAL /LPF
IMM GRANULOCYTES # BLD AUTO: 0.05 X10*3/UL (ref 0–0.5)
IMM GRANULOCYTES NFR BLD AUTO: 0.5 % (ref 0–0.9)
INHALED O2 CONCENTRATION: 44 %
KETONES UR STRIP.AUTO-MCNC: ABNORMAL MG/DL
LACTATE BLDV-SCNC: 1.1 MMOL/L (ref 0.4–2)
LEUKOCYTE ESTERASE UR QL STRIP.AUTO: ABNORMAL
LYMPHOCYTES # BLD AUTO: 1.32 X10*3/UL (ref 0.8–3)
LYMPHOCYTES NFR BLD AUTO: 12.1 %
MCH RBC QN AUTO: 32.5 PG (ref 26–34)
MCHC RBC AUTO-ENTMCNC: 33.5 G/DL (ref 32–36)
MCV RBC AUTO: 97 FL (ref 80–100)
MONOCYTES # BLD AUTO: 0.98 X10*3/UL (ref 0.05–0.8)
MONOCYTES NFR BLD AUTO: 9 %
MUCOUS THREADS #/AREA URNS AUTO: ABNORMAL /LPF
NEUTROPHILS # BLD AUTO: 8.53 X10*3/UL (ref 1.6–5.5)
NEUTROPHILS NFR BLD AUTO: 78.1 %
NITRITE UR QL STRIP.AUTO: NEGATIVE
NRBC BLD-RTO: 0 /100 WBCS (ref 0–0)
OXYHGB MFR BLDV: 90.9 % (ref 45–75)
P AXIS: 89 DEGREES
P OFFSET: 179 MS
P ONSET: 125 MS
PCO2 BLDV: 47 MM HG (ref 41–51)
PH BLDV: 7.42 PH (ref 7.33–7.43)
PH UR STRIP.AUTO: 6 [PH]
PLATELET # BLD AUTO: 166 X10*3/UL (ref 150–450)
PO2 BLDV: 69 MM HG (ref 35–45)
POTASSIUM BLDV-SCNC: 3.9 MMOL/L (ref 3.5–5.3)
POTASSIUM SERPL-SCNC: 3.7 MMOL/L (ref 3.5–5.3)
PR INTERVAL: 176 MS
PROT SERPL-MCNC: 7.4 G/DL (ref 6.4–8.2)
PROT UR STRIP.AUTO-MCNC: ABNORMAL MG/DL
Q ONSET: 213 MS
QRS COUNT: 16 BEATS
QRS DURATION: 96 MS
QT INTERVAL: 350 MS
QTC CALCULATION(BAZETT): 437 MS
QTC FREDERICIA: 406 MS
R AXIS: 116 DEGREES
RBC # BLD AUTO: 4.77 X10*6/UL (ref 4.5–5.9)
RBC # UR STRIP.AUTO: ABNORMAL /UL
RBC #/AREA URNS AUTO: ABNORMAL /HPF
SAO2 % BLDV: 94 % (ref 45–75)
SODIUM BLDV-SCNC: 136 MMOL/L (ref 136–145)
SODIUM SERPL-SCNC: 138 MMOL/L (ref 136–145)
SP GR UR STRIP.AUTO: >1.05
SQUAMOUS #/AREA URNS AUTO: ABNORMAL /HPF
T AXIS: 35 DEGREES
T OFFSET: 388 MS
UROBILINOGEN UR STRIP.AUTO-MCNC: ABNORMAL MG/DL
VENTRICULAR RATE: 94 BPM
WBC # BLD AUTO: 10.9 X10*3/UL (ref 4.4–11.3)
WBC #/AREA URNS AUTO: >50 /HPF

## 2024-11-13 PROCEDURE — 94664 DEMO&/EVAL PT USE INHALER: CPT

## 2024-11-13 PROCEDURE — 84484 ASSAY OF TROPONIN QUANT: CPT | Performed by: PHYSICIAN ASSISTANT

## 2024-11-13 PROCEDURE — 2500000004 HC RX 250 GENERAL PHARMACY W/ HCPCS (ALT 636 FOR OP/ED): Performed by: PHYSICIAN ASSISTANT

## 2024-11-13 PROCEDURE — 2550000001 HC RX 255 CONTRASTS: Performed by: PHYSICIAN ASSISTANT

## 2024-11-13 PROCEDURE — 93005 ELECTROCARDIOGRAM TRACING: CPT

## 2024-11-13 PROCEDURE — 9420000001 HC RT PATIENT EDUCATION 5 MIN

## 2024-11-13 PROCEDURE — 72131 CT LUMBAR SPINE W/O DYE: CPT | Performed by: RADIOLOGY

## 2024-11-13 PROCEDURE — 71260 CT THORAX DX C+: CPT

## 2024-11-13 PROCEDURE — 94640 AIRWAY INHALATION TREATMENT: CPT

## 2024-11-13 PROCEDURE — 72125 CT NECK SPINE W/O DYE: CPT | Performed by: RADIOLOGY

## 2024-11-13 PROCEDURE — 2500000001 HC RX 250 WO HCPCS SELF ADMINISTERED DRUGS (ALT 637 FOR MEDICARE OP): Performed by: STUDENT IN AN ORGANIZED HEALTH CARE EDUCATION/TRAINING PROGRAM

## 2024-11-13 PROCEDURE — 72125 CT NECK SPINE W/O DYE: CPT

## 2024-11-13 PROCEDURE — 2500000004 HC RX 250 GENERAL PHARMACY W/ HCPCS (ALT 636 FOR OP/ED): Performed by: NURSE PRACTITIONER

## 2024-11-13 PROCEDURE — 94640 AIRWAY INHALATION TREATMENT: CPT | Mod: 59

## 2024-11-13 PROCEDURE — 36415 COLL VENOUS BLD VENIPUNCTURE: CPT | Performed by: PHYSICIAN ASSISTANT

## 2024-11-13 PROCEDURE — G0378 HOSPITAL OBSERVATION PER HR: HCPCS

## 2024-11-13 PROCEDURE — 74177 CT ABD & PELVIS W/CONTRAST: CPT | Performed by: RADIOLOGY

## 2024-11-13 PROCEDURE — 87086 URINE CULTURE/COLONY COUNT: CPT | Mod: GENLAB | Performed by: PHYSICIAN ASSISTANT

## 2024-11-13 PROCEDURE — 94667 MNPJ CHEST WALL 1ST: CPT

## 2024-11-13 PROCEDURE — 2500000005 HC RX 250 GENERAL PHARMACY W/O HCPCS: Performed by: PHYSICIAN ASSISTANT

## 2024-11-13 PROCEDURE — 71275 CT ANGIOGRAPHY CHEST: CPT

## 2024-11-13 PROCEDURE — 81001 URINALYSIS AUTO W/SCOPE: CPT | Performed by: PHYSICIAN ASSISTANT

## 2024-11-13 PROCEDURE — 2500000002 HC RX 250 W HCPCS SELF ADMINISTERED DRUGS (ALT 637 FOR MEDICARE OP, ALT 636 FOR OP/ED): Performed by: EMERGENCY MEDICINE

## 2024-11-13 PROCEDURE — 85025 COMPLETE CBC W/AUTO DIFF WBC: CPT | Performed by: PHYSICIAN ASSISTANT

## 2024-11-13 PROCEDURE — 70450 CT HEAD/BRAIN W/O DYE: CPT

## 2024-11-13 PROCEDURE — 83880 ASSAY OF NATRIURETIC PEPTIDE: CPT | Performed by: PHYSICIAN ASSISTANT

## 2024-11-13 PROCEDURE — 72128 CT CHEST SPINE W/O DYE: CPT | Performed by: RADIOLOGY

## 2024-11-13 PROCEDURE — 71275 CT ANGIOGRAPHY CHEST: CPT | Performed by: RADIOLOGY

## 2024-11-13 PROCEDURE — 72128 CT CHEST SPINE W/O DYE: CPT

## 2024-11-13 PROCEDURE — 71260 CT THORAX DX C+: CPT | Performed by: RADIOLOGY

## 2024-11-13 PROCEDURE — 2500000005 HC RX 250 GENERAL PHARMACY W/O HCPCS: Performed by: NURSE PRACTITIONER

## 2024-11-13 PROCEDURE — 94760 N-INVAS EAR/PLS OXIMETRY 1: CPT

## 2024-11-13 PROCEDURE — 70450 CT HEAD/BRAIN W/O DYE: CPT | Performed by: RADIOLOGY

## 2024-11-13 PROCEDURE — 84075 ASSAY ALKALINE PHOSPHATASE: CPT | Performed by: PHYSICIAN ASSISTANT

## 2024-11-13 PROCEDURE — 2500000002 HC RX 250 W HCPCS SELF ADMINISTERED DRUGS (ALT 637 FOR MEDICARE OP, ALT 636 FOR OP/ED): Performed by: NURSE PRACTITIONER

## 2024-11-13 PROCEDURE — 94669 MECHANICAL CHEST WALL OSCILL: CPT

## 2024-11-13 PROCEDURE — 84132 ASSAY OF SERUM POTASSIUM: CPT | Performed by: PHYSICIAN ASSISTANT

## 2024-11-13 PROCEDURE — 96372 THER/PROPH/DIAG INJ SC/IM: CPT | Performed by: NURSE PRACTITIONER

## 2024-11-13 PROCEDURE — 72131 CT LUMBAR SPINE W/O DYE: CPT

## 2024-11-13 PROCEDURE — 99285 EMERGENCY DEPT VISIT HI MDM: CPT | Mod: 25

## 2024-11-13 PROCEDURE — 2550000001 HC RX 255 CONTRASTS: Performed by: EMERGENCY MEDICINE

## 2024-11-13 PROCEDURE — 2500000004 HC RX 250 GENERAL PHARMACY W/ HCPCS (ALT 636 FOR OP/ED): Performed by: EMERGENCY MEDICINE

## 2024-11-13 RX ORDER — ALBUTEROL SULFATE 0.83 MG/ML
2.5 SOLUTION RESPIRATORY (INHALATION) EVERY 2 HOUR PRN
Status: DISCONTINUED | OUTPATIENT
Start: 2024-11-13 | End: 2024-11-16 | Stop reason: HOSPADM

## 2024-11-13 RX ORDER — GUAIFENESIN 600 MG/1
600 TABLET, EXTENDED RELEASE ORAL 2 TIMES DAILY
Status: DISCONTINUED | OUTPATIENT
Start: 2024-11-13 | End: 2024-11-16 | Stop reason: HOSPADM

## 2024-11-13 RX ORDER — CEFTRIAXONE 1 G/50ML
1 INJECTION, SOLUTION INTRAVENOUS ONCE
Status: COMPLETED | OUTPATIENT
Start: 2024-11-13 | End: 2024-11-13

## 2024-11-13 RX ORDER — ASPIRIN 81 MG/1
81 TABLET ORAL DAILY
Status: ON HOLD | COMMUNITY
End: 2024-11-14 | Stop reason: ENTERED-IN-ERROR

## 2024-11-13 RX ORDER — ONDANSETRON HYDROCHLORIDE 2 MG/ML
4 INJECTION, SOLUTION INTRAVENOUS EVERY 8 HOURS PRN
Status: DISCONTINUED | OUTPATIENT
Start: 2024-11-13 | End: 2024-11-16 | Stop reason: HOSPADM

## 2024-11-13 RX ORDER — GUAIFENESIN/DEXTROMETHORPHAN 100-10MG/5
5 SYRUP ORAL EVERY 4 HOURS PRN
Status: DISCONTINUED | OUTPATIENT
Start: 2024-11-13 | End: 2024-11-16 | Stop reason: HOSPADM

## 2024-11-13 RX ORDER — TAMSULOSIN HYDROCHLORIDE 0.4 MG/1
0.8 CAPSULE ORAL DAILY
Status: DISCONTINUED | OUTPATIENT
Start: 2024-11-14 | End: 2024-11-16 | Stop reason: HOSPADM

## 2024-11-13 RX ORDER — SIMETHICONE 80 MG
80 TABLET,CHEWABLE ORAL 4 TIMES DAILY PRN
Status: DISCONTINUED | OUTPATIENT
Start: 2024-11-13 | End: 2024-11-16 | Stop reason: HOSPADM

## 2024-11-13 RX ORDER — IPRATROPIUM BROMIDE AND ALBUTEROL SULFATE 2.5; .5 MG/3ML; MG/3ML
3 SOLUTION RESPIRATORY (INHALATION)
Status: DISCONTINUED | OUTPATIENT
Start: 2024-11-13 | End: 2024-11-13

## 2024-11-13 RX ORDER — MORPHINE SULFATE 4 MG/ML
4 INJECTION, SOLUTION INTRAMUSCULAR; INTRAVENOUS ONCE
Status: COMPLETED | OUTPATIENT
Start: 2024-11-13 | End: 2024-11-13

## 2024-11-13 RX ORDER — POLYETHYLENE GLYCOL 3350 17 G/17G
17 POWDER, FOR SOLUTION ORAL DAILY PRN
Status: DISCONTINUED | OUTPATIENT
Start: 2024-11-13 | End: 2024-11-16 | Stop reason: HOSPADM

## 2024-11-13 RX ORDER — ACETAMINOPHEN 325 MG/1
650 TABLET ORAL EVERY 4 HOURS PRN
Status: DISCONTINUED | OUTPATIENT
Start: 2024-11-13 | End: 2024-11-16 | Stop reason: HOSPADM

## 2024-11-13 RX ORDER — PANTOPRAZOLE SODIUM 40 MG/1
40 TABLET, DELAYED RELEASE ORAL
Status: DISCONTINUED | OUTPATIENT
Start: 2024-11-14 | End: 2024-11-16 | Stop reason: HOSPADM

## 2024-11-13 RX ORDER — ROSUVASTATIN CALCIUM 10 MG/1
10 TABLET, COATED ORAL DAILY
Status: DISCONTINUED | OUTPATIENT
Start: 2024-11-14 | End: 2024-11-16 | Stop reason: HOSPADM

## 2024-11-13 RX ORDER — ONDANSETRON HYDROCHLORIDE 2 MG/ML
4 INJECTION, SOLUTION INTRAVENOUS ONCE
Status: COMPLETED | OUTPATIENT
Start: 2024-11-13 | End: 2024-11-13

## 2024-11-13 RX ORDER — DOCUSATE SODIUM 100 MG/1
100 CAPSULE, LIQUID FILLED ORAL 2 TIMES DAILY PRN
Status: DISCONTINUED | OUTPATIENT
Start: 2024-11-13 | End: 2024-11-16 | Stop reason: HOSPADM

## 2024-11-13 RX ORDER — AZITHROMYCIN 250 MG/1
500 TABLET, FILM COATED ORAL DAILY
Status: COMPLETED | OUTPATIENT
Start: 2024-11-13 | End: 2024-11-15

## 2024-11-13 RX ORDER — ENOXAPARIN SODIUM 100 MG/ML
40 INJECTION SUBCUTANEOUS EVERY 24 HOURS
Status: DISCONTINUED | OUTPATIENT
Start: 2024-11-13 | End: 2024-11-16 | Stop reason: HOSPADM

## 2024-11-13 RX ORDER — ALBUTEROL SULFATE 90 UG/1
2 INHALANT RESPIRATORY (INHALATION) EVERY 6 HOURS PRN
COMMUNITY
Start: 2023-11-17

## 2024-11-13 RX ORDER — ASPIRIN 81 MG/1
81 TABLET ORAL DAILY
Status: DISCONTINUED | OUTPATIENT
Start: 2024-11-14 | End: 2024-11-14

## 2024-11-13 RX ORDER — OXYCODONE HYDROCHLORIDE 5 MG/1
5 TABLET ORAL EVERY 6 HOURS PRN
Status: DISCONTINUED | OUTPATIENT
Start: 2024-11-13 | End: 2024-11-16 | Stop reason: HOSPADM

## 2024-11-13 RX ORDER — KETOROLAC TROMETHAMINE 15 MG/ML
15 INJECTION, SOLUTION INTRAMUSCULAR; INTRAVENOUS EVERY 6 HOURS PRN
Status: DISCONTINUED | OUTPATIENT
Start: 2024-11-13 | End: 2024-11-16 | Stop reason: HOSPADM

## 2024-11-13 RX ORDER — OXYCODONE HYDROCHLORIDE 5 MG/1
10 TABLET ORAL EVERY 6 HOURS PRN
Status: DISCONTINUED | OUTPATIENT
Start: 2024-11-13 | End: 2024-11-16 | Stop reason: HOSPADM

## 2024-11-13 RX ORDER — ROSUVASTATIN CALCIUM 20 MG/1
10 TABLET, COATED ORAL EVERY EVENING
COMMUNITY
Start: 2023-11-13

## 2024-11-13 RX ORDER — IPRATROPIUM BROMIDE AND ALBUTEROL SULFATE 2.5; .5 MG/3ML; MG/3ML
3 SOLUTION RESPIRATORY (INHALATION)
Status: DISCONTINUED | OUTPATIENT
Start: 2024-11-13 | End: 2024-11-16 | Stop reason: HOSPADM

## 2024-11-13 RX ORDER — TAMSULOSIN HYDROCHLORIDE 0.4 MG/1
0.8 CAPSULE ORAL DAILY
COMMUNITY
Start: 2024-05-06

## 2024-11-13 RX ORDER — TALC
3 POWDER (GRAM) TOPICAL NIGHTLY PRN
Status: DISCONTINUED | OUTPATIENT
Start: 2024-11-13 | End: 2024-11-16 | Stop reason: HOSPADM

## 2024-11-13 SDOH — SOCIAL STABILITY: SOCIAL INSECURITY: ARE YOU OR HAVE YOU BEEN THREATENED OR ABUSED PHYSICALLY, EMOTIONALLY, OR SEXUALLY BY ANYONE?: NO

## 2024-11-13 SDOH — SOCIAL STABILITY: SOCIAL INSECURITY: WITHIN THE LAST YEAR, HAVE YOU BEEN HUMILIATED OR EMOTIONALLY ABUSED IN OTHER WAYS BY YOUR PARTNER OR EX-PARTNER?: NO

## 2024-11-13 SDOH — ECONOMIC STABILITY: FOOD INSECURITY: WITHIN THE PAST 12 MONTHS, YOU WORRIED THAT YOUR FOOD WOULD RUN OUT BEFORE YOU GOT THE MONEY TO BUY MORE.: NEVER TRUE

## 2024-11-13 SDOH — SOCIAL STABILITY: SOCIAL INSECURITY
WITHIN THE LAST YEAR, HAVE YOU BEEN KICKED, HIT, SLAPPED, OR OTHERWISE PHYSICALLY HURT BY YOUR PARTNER OR EX-PARTNER?: NO

## 2024-11-13 SDOH — SOCIAL STABILITY: SOCIAL INSECURITY: DOES ANYONE TRY TO KEEP YOU FROM HAVING/CONTACTING OTHER FRIENDS OR DOING THINGS OUTSIDE YOUR HOME?: NO

## 2024-11-13 SDOH — SOCIAL STABILITY: SOCIAL INSECURITY: ABUSE: ADULT

## 2024-11-13 SDOH — SOCIAL STABILITY: SOCIAL INSECURITY: DO YOU FEEL ANYONE HAS EXPLOITED OR TAKEN ADVANTAGE OF YOU FINANCIALLY OR OF YOUR PERSONAL PROPERTY?: NO

## 2024-11-13 SDOH — SOCIAL STABILITY: SOCIAL INSECURITY: ARE THERE ANY APPARENT SIGNS OF INJURIES/BEHAVIORS THAT COULD BE RELATED TO ABUSE/NEGLECT?: NO

## 2024-11-13 SDOH — ECONOMIC STABILITY: INCOME INSECURITY: IN THE PAST 12 MONTHS HAS THE ELECTRIC, GAS, OIL, OR WATER COMPANY THREATENED TO SHUT OFF SERVICES IN YOUR HOME?: NO

## 2024-11-13 SDOH — SOCIAL STABILITY: SOCIAL INSECURITY: HAS ANYONE EVER THREATENED TO HURT YOUR FAMILY OR YOUR PETS?: NO

## 2024-11-13 SDOH — SOCIAL STABILITY: SOCIAL INSECURITY
WITHIN THE LAST YEAR, HAVE YOU BEEN RAPED OR FORCED TO HAVE ANY KIND OF SEXUAL ACTIVITY BY YOUR PARTNER OR EX-PARTNER?: NO

## 2024-11-13 SDOH — ECONOMIC STABILITY: FOOD INSECURITY: WITHIN THE PAST 12 MONTHS, THE FOOD YOU BOUGHT JUST DIDN'T LAST AND YOU DIDN'T HAVE MONEY TO GET MORE.: NEVER TRUE

## 2024-11-13 SDOH — SOCIAL STABILITY: SOCIAL INSECURITY: WITHIN THE LAST YEAR, HAVE YOU BEEN AFRAID OF YOUR PARTNER OR EX-PARTNER?: NO

## 2024-11-13 SDOH — SOCIAL STABILITY: SOCIAL INSECURITY: HAVE YOU HAD THOUGHTS OF HARMING ANYONE ELSE?: NO

## 2024-11-13 SDOH — SOCIAL STABILITY: SOCIAL INSECURITY: DO YOU FEEL UNSAFE GOING BACK TO THE PLACE WHERE YOU ARE LIVING?: NO

## 2024-11-13 SDOH — SOCIAL STABILITY: SOCIAL INSECURITY: WERE YOU ABLE TO COMPLETE ALL THE BEHAVIORAL HEALTH SCREENINGS?: YES

## 2024-11-13 ASSESSMENT — ACTIVITIES OF DAILY LIVING (ADL)
FEEDING YOURSELF: INDEPENDENT
HEARING - LEFT EAR: DIFFICULTY WITH NOISE
LACK_OF_TRANSPORTATION: NO
GROOMING: INDEPENDENT
HEARING - RIGHT EAR: DIFFICULTY WITH NOISE
TOILETING: NEEDS ASSISTANCE
ADEQUATE_TO_COMPLETE_ADL: YES
ASSISTIVE_DEVICE: WALKER;OXYGEN
PATIENT'S MEMORY ADEQUATE TO SAFELY COMPLETE DAILY ACTIVITIES?: YES
WALKS IN HOME: NEEDS ASSISTANCE
BATHING: NEEDS ASSISTANCE
JUDGMENT_ADEQUATE_SAFELY_COMPLETE_DAILY_ACTIVITIES: YES
DRESSING YOURSELF: NEEDS ASSISTANCE

## 2024-11-13 ASSESSMENT — PAIN DESCRIPTION - LOCATION
LOCATION: BACK
LOCATION: HIP
LOCATION: HIP

## 2024-11-13 ASSESSMENT — COGNITIVE AND FUNCTIONAL STATUS - GENERAL
TOILETING: A LOT
PERSONAL GROOMING: A LITTLE
DAILY ACTIVITIY SCORE: 18
HELP NEEDED FOR BATHING: A LITTLE
DRESSING REGULAR UPPER BODY CLOTHING: A LITTLE
PATIENT BASELINE BEDBOUND: NO
STANDING UP FROM CHAIR USING ARMS: A LOT
MOVING TO AND FROM BED TO CHAIR: A LOT
MOBILITY SCORE: 15
CLIMB 3 TO 5 STEPS WITH RAILING: A LOT
DRESSING REGULAR LOWER BODY CLOTHING: A LITTLE
TURNING FROM BACK TO SIDE WHILE IN FLAT BAD: A LITTLE
WALKING IN HOSPITAL ROOM: A LOT

## 2024-11-13 ASSESSMENT — LIFESTYLE VARIABLES
SKIP TO QUESTIONS 9-10: 1
HOW MANY STANDARD DRINKS CONTAINING ALCOHOL DO YOU HAVE ON A TYPICAL DAY: PATIENT DOES NOT DRINK
AUDIT-C TOTAL SCORE: 0
AUDIT-C TOTAL SCORE: 0
HOW OFTEN DO YOU HAVE 6 OR MORE DRINKS ON ONE OCCASION: NEVER
HOW OFTEN DO YOU HAVE A DRINK CONTAINING ALCOHOL: NEVER

## 2024-11-13 ASSESSMENT — PAIN - FUNCTIONAL ASSESSMENT: PAIN_FUNCTIONAL_ASSESSMENT: 0-10

## 2024-11-13 ASSESSMENT — COLUMBIA-SUICIDE SEVERITY RATING SCALE - C-SSRS
1. IN THE PAST MONTH, HAVE YOU WISHED YOU WERE DEAD OR WISHED YOU COULD GO TO SLEEP AND NOT WAKE UP?: NO
6. HAVE YOU EVER DONE ANYTHING, STARTED TO DO ANYTHING, OR PREPARED TO DO ANYTHING TO END YOUR LIFE?: NO
2. HAVE YOU ACTUALLY HAD ANY THOUGHTS OF KILLING YOURSELF?: NO

## 2024-11-13 ASSESSMENT — PAIN SCALES - GENERAL
PAINLEVEL_OUTOF10: 6
PAINLEVEL_OUTOF10: 6
PAINLEVEL_OUTOF10: 7

## 2024-11-13 ASSESSMENT — PATIENT HEALTH QUESTIONNAIRE - PHQ9
SUM OF ALL RESPONSES TO PHQ9 QUESTIONS 1 & 2: 0
1. LITTLE INTEREST OR PLEASURE IN DOING THINGS: NOT AT ALL
2. FEELING DOWN, DEPRESSED OR HOPELESS: NOT AT ALL

## 2024-11-13 ASSESSMENT — PAIN DESCRIPTION - PAIN TYPE: TYPE: ACUTE PAIN

## 2024-11-13 ASSESSMENT — PAIN DESCRIPTION - ORIENTATION: ORIENTATION: RIGHT;LEFT

## 2024-11-13 NOTE — ED PROVIDER NOTES
HPI   Chief Complaint   Patient presents with   • Fall     Pt brought in by ems s/p fall 2 weeks ago, states he fell off of a ladder, c/o bilateral hip and groin pain that has gotten progressively worse       History of present illness:  74-year-old male presents emergency room for complaints of persistent pain numbness left side after fall off a ladder.  The patient apparently states that he was working on a ladder about 2 weeks ago and he states he about 6 feet up.  He states that he fell onto a concrete floor.  He states he believes he landed on his left side but he states he is having pain across his buttocks across his lower back.  He states he is taken some Percocet that his wife found from an old prescription he states this did not help very much at all and he also found some old Flexeril in the medicine cabinet they also took with also minimal relief.  He has a past medical history of COPD and states he is post be on oxygen at night but does not wear it during the day.  He states he feels like he has been having to wear during the day though as he has been feeling like his oxygen has not been very good and he is having difficulty catching his breath over the past couple days.  He has not been coughing up anything denies any hemoptysis.  Denies hitting his head denies any headache or vision changes or nausea.  He denies any blood thinner use.  He denies any other symptoms at this time.    Social history: Negative for alcohol and drug use.    Review of systems:   Gen.: No weight loss,  fever.   Eyes: No vision loss, double vision  ENT: No pharyngitis, neck pain, headache  Cardiac: No  palpitations, syncope  Pulmonary: No shortness of breath, cough, hemoptysis.   Heme/lymph: No swollen glands, fever, bleeding.   GI: No abdominal pain, change in bowel habits, melena, hematemesis, hematochezia, nausea, vomiting, diarrhea.   : No discharge, dysuria, frequency, urgency, hematuria.   Musculoskeletal: No limb pain,  joint pain, joint swelling.   Skin: No rashes.   Review of systems is otherwise negative unless stated above or in history of present illness.        Physical exam:  General: Vitals noted, no distress. Afebrile.   EENT: No lymphadenopathy appreciated  Cardiac: Regular, rate, rhythm, no murmur.   Pulmonary: Lungs clear bilaterally with good aeration. No adventitious breath sounds.   Abdomen: Soft, nonsurgical. Nontender. No peritoneal signs. Normoactive bowel sounds.   Extremities: No peripheral edema.   Skin: No rash.   Neuro: No focal neurologic deficits      Medical decision making:   Testing: Urinalysis shows concerns for UTI with greater than 50 white blood cells 11-20 red blood cells venous blood gas shows pO2 at 69 first troponin 39-second troponin 37  CMP unremarkable CBC unremarkable CT scan of the chest and pelvis does not show any acute findings other than some atelectasis as interpreted by radiology scan of the head cervical spine thoracic spine lumbar spine are all unremarkable for any acute findings  Treatment: SoluMedrol and breathing treatments given  Reevaluation:           Plan: 74-year-old male presents emergency room for complaints of persistent pain numbness left side after fall off a ladder.  The patient apparently states that he was working on a ladder about 2 weeks ago and he states he about 6 feet up.  He states that he fell onto a concrete floor.  He states he believes he landed on his left side but he states he is having pain across his buttocks across his lower back.  He states he is taken some Percocet that his wife found from an old prescription he states this did not help very much at all and he also found some old Flexeril in the medicine cabinet they also took with also minimal relief.  He has a past medical history of COPD and states he is post be on oxygen at night but does not wear it during the day.  He states he feels like he has been having to wear during the day though  as he has been feeling like his oxygen has not been very good and he is having difficulty catching his breath over the past couple days.  He has not been coughing up anything denies any hemoptysis.  Denies hitting his head denies any headache or vision changes or nausea.  He denies any blood thinner use.  He denies any other symptoms at this time. Cardiac: Regular, rate, rhythm, no murmur.   Pulmonary: Lungs are diminished throughout and there is some rhonchi in the bases bilaterally,  Abdomen: Soft, nonsurgical. Nontender. No peritoneal signs. Normoactive bowel sounds.  As mentioned above laboratory testing and imaging did not show any acute findings at this time with exception of the UTI.  I explained the patient I felt it be best for him to be admitted though for COPD with exacerbation as during his time here he became hypoxic.  The patient was on 2 L nasal cannula began dropping down to 88 to 89%.  I increased the oxygen to 4 L at this time.  He was agreeable to being admitted at this time as well as his family.  I spoke with the hospitalist team accepted the patient at this time.  Impression:   1.  COPD exacerbation  2.  UTI  3.  Hypoxia          History provided by:  Patient   used: No            Patient History   No past medical history on file.  No past surgical history on file.  No family history on file.  Social History     Tobacco Use   • Smoking status: Not on file   • Smokeless tobacco: Not on file   Substance Use Topics   • Alcohol use: Not on file   • Drug use: Not on file       Physical Exam   ED Triage Vitals [11/13/24 1316]   Temperature Heart Rate Respirations BP   36.9 °C (98.4 °F) 97 20 157/62      Pulse Ox Temp Source Heart Rate Source Patient Position   96 % Temporal -- --      BP Location FiO2 (%)     -- --       Physical Exam      ED Course & [facility]   ED Course as of 11/13/24 1912 Wed Nov 13, 2024   1452 ECG 12 lead  EKG interpreted by me shows sinus rhythm with rate of  94.  Artifactual baseline.  Nonspecific ST-T changes.  No acute injury pattern. [BT]   1838 74-year-old male fell couple weeks ago off a ladder.  Today, noted to be hypoxic.  Typically on 2 L nasal cannula oxygen.  No required 6 L oxygen here.  Weaned down to 4 L nasal cannula.  No traumatic injuries on imaging.  CT chest PE study negative for pulmonary embolus.  Wheezing.  Given DuoNeb and Solu-Medrol.  Discussed with medicine hospitalist Modesta Mckinnon NP who accepted patient for admission on behalf of Dr. Irby. [BT]      ED Course User Index  [BT] Boni Christie DO         Diagnoses as of 11/13/24 1912   COPD with acute exacerbation (Multi)   Hypoxia   Fall, initial encounter                 No data recorded                                 Medical Decision Making      Procedure  Procedures     Zafar Li PA-C  11/13/24 1917       Zafar Li PA-C  11/15/24 1458       Zafar Li PA-C  11/15/24 1454

## 2024-11-14 ENCOUNTER — APPOINTMENT (OUTPATIENT)
Dept: RADIOLOGY | Facility: HOSPITAL | Age: 74
DRG: 555 | End: 2024-11-14
Payer: OTHER GOVERNMENT

## 2024-11-14 PROBLEM — N40.0 BENIGN PROSTATIC HYPERPLASIA WITHOUT LOWER URINARY TRACT SYMPTOMS: Status: ACTIVE | Noted: 2024-11-14

## 2024-11-14 PROBLEM — N30.00 ACUTE CYSTITIS WITHOUT HEMATURIA: Status: ACTIVE | Noted: 2024-11-14

## 2024-11-14 PROBLEM — J44.1 COPD WITH ACUTE EXACERBATION (MULTI): Status: RESOLVED | Noted: 2024-11-14 | Resolved: 2024-11-14

## 2024-11-14 PROBLEM — J96.21 ACUTE ON CHRONIC HYPOXIC RESPIRATORY FAILURE (MULTI): Status: ACTIVE | Noted: 2024-11-14
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
## 2024-11-14 PROBLEM — M25.552 PAIN OF LEFT HIP: Status: ACTIVE | Noted: 2024-11-14

## 2024-11-14 PROBLEM — E78.49 OTHER HYPERLIPIDEMIA: Status: ACTIVE | Noted: 2024-11-14

## 2024-11-14 PROBLEM — J44.1 COPD WITH ACUTE EXACERBATION (MULTI): Status: ACTIVE | Noted: 2024-11-14

## 2024-11-14 LAB
ANION GAP SERPL CALC-SCNC: 13 MMOL/L (ref 10–20)
BUN SERPL-MCNC: 25 MG/DL (ref 6–23)
CALCIUM SERPL-MCNC: 9.6 MG/DL (ref 8.6–10.3)
CHLORIDE SERPL-SCNC: 102 MMOL/L (ref 98–107)
CO2 SERPL-SCNC: 29 MMOL/L (ref 21–32)
CREAT SERPL-MCNC: 1.06 MG/DL (ref 0.5–1.3)
EGFRCR SERPLBLD CKD-EPI 2021: 73 ML/MIN/1.73M*2
ERYTHROCYTE [DISTWIDTH] IN BLOOD BY AUTOMATED COUNT: 12.3 % (ref 11.5–14.5)
GLUCOSE SERPL-MCNC: 167 MG/DL (ref 74–99)
HCT VFR BLD AUTO: 47.9 % (ref 41–52)
HGB BLD-MCNC: 15.3 G/DL (ref 13.5–17.5)
HOLD SPECIMEN: NORMAL
MCH RBC QN AUTO: 31.7 PG (ref 26–34)
MCHC RBC AUTO-ENTMCNC: 31.9 G/DL (ref 32–36)
MCV RBC AUTO: 99 FL (ref 80–100)
NRBC BLD-RTO: 0 /100 WBCS (ref 0–0)
PLATELET # BLD AUTO: 191 X10*3/UL (ref 150–450)
POTASSIUM SERPL-SCNC: 4 MMOL/L (ref 3.5–5.3)
RBC # BLD AUTO: 4.82 X10*6/UL (ref 4.5–5.9)
SODIUM SERPL-SCNC: 140 MMOL/L (ref 136–145)
WBC # BLD AUTO: 9.6 X10*3/UL (ref 4.4–11.3)

## 2024-11-14 PROCEDURE — 2500000002 HC RX 250 W HCPCS SELF ADMINISTERED DRUGS (ALT 637 FOR MEDICARE OP, ALT 636 FOR OP/ED): Performed by: STUDENT IN AN ORGANIZED HEALTH CARE EDUCATION/TRAINING PROGRAM

## 2024-11-14 PROCEDURE — 80048 BASIC METABOLIC PNL TOTAL CA: CPT | Performed by: NURSE PRACTITIONER

## 2024-11-14 PROCEDURE — 94760 N-INVAS EAR/PLS OXIMETRY 1: CPT | Mod: IPSPLIT

## 2024-11-14 PROCEDURE — 2500000004 HC RX 250 GENERAL PHARMACY W/ HCPCS (ALT 636 FOR OP/ED): Mod: IPSPLIT | Performed by: STUDENT IN AN ORGANIZED HEALTH CARE EDUCATION/TRAINING PROGRAM

## 2024-11-14 PROCEDURE — 94640 AIRWAY INHALATION TREATMENT: CPT | Mod: IPSPLIT

## 2024-11-14 PROCEDURE — 2500000002 HC RX 250 W HCPCS SELF ADMINISTERED DRUGS (ALT 637 FOR MEDICARE OP, ALT 636 FOR OP/ED): Performed by: NURSE PRACTITIONER

## 2024-11-14 PROCEDURE — 85027 COMPLETE CBC AUTOMATED: CPT | Performed by: NURSE PRACTITIONER

## 2024-11-14 PROCEDURE — 94669 MECHANICAL CHEST WALL OSCILL: CPT | Mod: IPSPLIT

## 2024-11-14 PROCEDURE — 97165 OT EVAL LOW COMPLEX 30 MIN: CPT | Mod: GO | Performed by: OCCUPATIONAL THERAPIST

## 2024-11-14 PROCEDURE — 36415 COLL VENOUS BLD VENIPUNCTURE: CPT | Performed by: NURSE PRACTITIONER

## 2024-11-14 PROCEDURE — 94668 MNPJ CHEST WALL SBSQ: CPT

## 2024-11-14 PROCEDURE — 2500000005 HC RX 250 GENERAL PHARMACY W/O HCPCS: Performed by: NURSE PRACTITIONER

## 2024-11-14 PROCEDURE — 99223 1ST HOSP IP/OBS HIGH 75: CPT

## 2024-11-14 PROCEDURE — 2500000004 HC RX 250 GENERAL PHARMACY W/ HCPCS (ALT 636 FOR OP/ED): Mod: IPSPLIT

## 2024-11-14 PROCEDURE — 73721 MRI JNT OF LWR EXTRE W/O DYE: CPT | Mod: LT,IPSPLIT

## 2024-11-14 PROCEDURE — 2500000004 HC RX 250 GENERAL PHARMACY W/ HCPCS (ALT 636 FOR OP/ED): Performed by: NURSE PRACTITIONER

## 2024-11-14 PROCEDURE — 1100000001 HC PRIVATE ROOM DAILY: Mod: IPSPLIT

## 2024-11-14 PROCEDURE — 2500000001 HC RX 250 WO HCPCS SELF ADMINISTERED DRUGS (ALT 637 FOR MEDICARE OP): Performed by: STUDENT IN AN ORGANIZED HEALTH CARE EDUCATION/TRAINING PROGRAM

## 2024-11-14 PROCEDURE — 97535 SELF CARE MNGMENT TRAINING: CPT | Mod: GO | Performed by: OCCUPATIONAL THERAPIST

## 2024-11-14 PROCEDURE — 2500000001 HC RX 250 WO HCPCS SELF ADMINISTERED DRUGS (ALT 637 FOR MEDICARE OP): Performed by: NURSE PRACTITIONER

## 2024-11-14 RX ORDER — FOLIC ACID 1 MG/1
1 TABLET ORAL DAILY
COMMUNITY

## 2024-11-14 RX ORDER — CHOLECALCIFEROL (VITAMIN D3) 50 MCG
50 TABLET ORAL EVERY OTHER DAY
COMMUNITY

## 2024-11-14 RX ORDER — LORAZEPAM 2 MG/ML
1 INJECTION INTRAMUSCULAR ONCE
Status: COMPLETED | OUTPATIENT
Start: 2024-11-14 | End: 2024-11-14

## 2024-11-14 RX ORDER — IPRATROPIUM BROMIDE AND ALBUTEROL SULFATE 2.5; .5 MG/3ML; MG/3ML
3 SOLUTION RESPIRATORY (INHALATION) 4 TIMES DAILY PRN
COMMUNITY

## 2024-11-14 RX ORDER — CEFTRIAXONE 1 G/50ML
1 INJECTION, SOLUTION INTRAVENOUS EVERY 24 HOURS
Status: DISCONTINUED | OUTPATIENT
Start: 2024-11-14 | End: 2024-11-16 | Stop reason: HOSPADM

## 2024-11-14 ASSESSMENT — COGNITIVE AND FUNCTIONAL STATUS - GENERAL
WALKING IN HOSPITAL ROOM: A LOT
TOILETING: A LITTLE
HELP NEEDED FOR BATHING: A LOT
TURNING FROM BACK TO SIDE WHILE IN FLAT BAD: A LOT
DAILY ACTIVITIY SCORE: 15
DRESSING REGULAR UPPER BODY CLOTHING: A LITTLE
CLIMB 3 TO 5 STEPS WITH RAILING: A LOT
STANDING UP FROM CHAIR USING ARMS: A LOT
PERSONAL GROOMING: A LITTLE
CLIMB 3 TO 5 STEPS WITH RAILING: A LOT
DRESSING REGULAR LOWER BODY CLOTHING: A LOT
PERSONAL GROOMING: A LITTLE
STANDING UP FROM CHAIR USING ARMS: A LOT
MOBILITY SCORE: 13
HELP NEEDED FOR BATHING: A LOT
TOILETING: A LOT
TURNING FROM BACK TO SIDE WHILE IN FLAT BAD: A LOT
MOBILITY SCORE: 13
MOVING TO AND FROM BED TO CHAIR: A LOT
MOVING FROM LYING ON BACK TO SITTING ON SIDE OF FLAT BED WITH BEDRAILS: A LITTLE
TOILETING: TOTAL
MOVING TO AND FROM BED TO CHAIR: A LOT
DRESSING REGULAR UPPER BODY CLOTHING: A LITTLE
DRESSING REGULAR LOWER BODY CLOTHING: TOTAL
EATING MEALS: A LITTLE
DAILY ACTIVITIY SCORE: 14
EATING MEALS: A LITTLE
DRESSING REGULAR UPPER BODY CLOTHING: A LITTLE
WALKING IN HOSPITAL ROOM: A LOT
HELP NEEDED FOR BATHING: A LOT
MOVING FROM LYING ON BACK TO SITTING ON SIDE OF FLAT BED WITH BEDRAILS: A LITTLE
DAILY ACTIVITIY SCORE: 16
DRESSING REGULAR LOWER BODY CLOTHING: A LOT
PERSONAL GROOMING: A LITTLE

## 2024-11-14 ASSESSMENT — ENCOUNTER SYMPTOMS
ACTIVITY CHANGE: 1
NEUROLOGICAL NEGATIVE: 1
SHORTNESS OF BREATH: 1
EYES NEGATIVE: 1
HEMATOLOGIC/LYMPHATIC NEGATIVE: 1
ALLERGIC/IMMUNOLOGIC NEGATIVE: 1
GASTROINTESTINAL NEGATIVE: 1
PSYCHIATRIC NEGATIVE: 1
CARDIOVASCULAR NEGATIVE: 1
ARTHRALGIAS: 1
ENDOCRINE NEGATIVE: 1

## 2024-11-14 ASSESSMENT — PAIN - FUNCTIONAL ASSESSMENT
PAIN_FUNCTIONAL_ASSESSMENT: 0-10

## 2024-11-14 ASSESSMENT — ACTIVITIES OF DAILY LIVING (ADL)
HOME_MANAGEMENT_TIME_ENTRY: 21
BATHING_ASSISTANCE: MODERATE
BATHING_LEVEL_OF_ASSISTANCE: MODERATE ASSISTANCE
ADL_ASSISTANCE: INDEPENDENT

## 2024-11-14 ASSESSMENT — PAIN SCALES - GENERAL
PAINLEVEL_OUTOF10: 0 - NO PAIN
PAINLEVEL_OUTOF10: 1
PAINLEVEL_OUTOF10: 7
PAINLEVEL_OUTOF10: 7
PAINLEVEL_OUTOF10: 0 - NO PAIN
PAINLEVEL_OUTOF10: 5 - MODERATE PAIN

## 2024-11-14 ASSESSMENT — PAIN DESCRIPTION - DESCRIPTORS: DESCRIPTORS: NUMBNESS

## 2024-11-14 NOTE — NURSING NOTE
Copy of advance directives/ Power of  brought in by wife, copied and placed in chart    1333 to MRI    1520 sleeping, wife remains @ bedside.  Awaiting MRI results.    1637 drowsy but awakens easily, answering questions/ conversing with wife.  SIOBHAN S Gabo CNP in room to discuss results of MRI/ consult/ recommendation to transfer for ORTHO.  Await transfer info.    1745 SIOBHAN S Gaob CNP spoke with ORTHO @ [facility], transfer not indicated.  PT to see tomorrow. Pt/ spouse Rhiannon updated.

## 2024-11-14 NOTE — CARE PLAN
"The patient's goals for the shift include  \"feel better, less pain\"    The clinical goals for the shift include MRI today, control pain this shift    PRN oxy, had MRI.  Plan transfer for ORTHO.    "

## 2024-11-14 NOTE — H&P
History Of Present Illness  Christiano Cox is a 74 y.o. male presenting with pain after a fall. Pt states that about 3 weeks ago, he fell 6 feet off a ladder and landed on hit buttock. He has had increasing pain from the hips down since. He did start using a walker that his friend gave him that helped for a few days but the pain has gotten worse. He endorses the pain to be the worst in the left hip. Internal and external rotation is very painful and limited.     ED VS: T36.9, HR 97, RR 20, /62, Sp02 96% 2L     Imaging: CT C-Spine- 1. No acute fracture or spondylolisthesis.  2. Degenerative disc disease and spondylosis as described in the body  of the report.    CT PE: 1. No pulmonary embolus.  2. Mild centrilobular emphysema.  3. No acute intrathoracic process. No significant interval change  from 11/13/2020    CT head: No CT evidence for acute intracranial pathology.     CT L + T Spine: Old healed compression fracture of the 12th thoracic vertebral body.      No acute fracture of the thoracic spine is seen.    CT chest/abd/pelvis: CHEST:  1.  Elevated left hemidiaphragm with discoid atelectasis in the  lingula and within the left lower lobe inferiorly.  2. Centrilobular pulmonary emphysema.  3. Dilated ascending aorta with diameter of 4.3 cm. Calcified  granuloma left lower lobe.  4. Calcified granuloma left lower lobe.  5. Old healed compression fracture of T12 with anterior wedging of  the L1 which is also old.      ABDOMEN-PELVIS:  1.  Fatty infiltration of the liver.  2. Stable 2.4 cm fat containing right adrenal mass consistent with  stable adrenal myelolipoma.  3. 2 exophytic hypodense right renal lesions most likely cysts with  nonemergent renal ultrasound advised.  4. Enlarged prostate gland.  5. Small fat containing umbilical hernia with bilateral fat  containing inguinal hernias greater on the right than on the left.  6. Grade 1 spondylolisthesis of L5 on S1 with degenerative disc space  narrowing  from L3-4 through the L5-S1 levels.  7. Minimal colonic diverticulosis.  8. Increased soft tissue density about the pubic symphysis a little  more pronounced to the left of midline which could represent small  hematoma at this site without disruption of the pubic symphysis or  fracture of the pubis.    Labs: Glu 136, Na 138, K 3.7, Bun/creat 20/1.07, ALT/AST 54/147, , Trop 37, WBC 10.9, H/H 15.5/46.2, Plt 166     Past Medical History  Past Medical History:   Diagnosis Date    COPD (chronic obstructive pulmonary disease) (Multi)     DVT (deep venous thrombosis) (Multi)        Surgical History  Past Surgical History:   Procedure Laterality Date    CATARACT EXTRACTION, BILATERAL Bilateral         Social History  He reports that he quit smoking about 22 months ago. His smoking use included cigarettes. He started smoking about 65 years ago. He has a 126.3 pack-year smoking history. He has never used smokeless tobacco. He reports that he does not currently use alcohol. He reports that he does not use drugs.    Family History  No family history on file.     Allergies  Patient has no known allergies.    Review of Systems   Constitutional:  Positive for activity change.   HENT: Negative.     Eyes: Negative.    Respiratory:  Positive for shortness of breath.    Cardiovascular: Negative.    Gastrointestinal: Negative.    Endocrine: Negative.    Genitourinary: Negative.    Musculoskeletal:  Positive for arthralgias.   Skin: Negative.    Allergic/Immunologic: Negative.    Neurological: Negative.    Hematological: Negative.    Psychiatric/Behavioral: Negative.          Physical Exam  Vitals reviewed.   Constitutional:       Appearance: He is obese.   HENT:      Head: Normocephalic and atraumatic.      Right Ear: External ear normal.      Left Ear: External ear normal.      Nose: Nose normal.      Mouth/Throat:      Pharynx: Oropharynx is clear.   Eyes:      Conjunctiva/sclera: Conjunctivae normal.   Cardiovascular:       "Rate and Rhythm: Normal rate and regular rhythm.      Pulses: Normal pulses.      Heart sounds: Normal heart sounds.   Pulmonary:      Breath sounds: Decreased breath sounds present.   Abdominal:      General: Bowel sounds are normal.      Palpations: Abdomen is soft.   Musculoskeletal:         General: Tenderness present.      Cervical back: Normal range of motion and neck supple.   Skin:     Findings: Bruising present.   Neurological:      General: No focal deficit present.      Mental Status: He is alert and oriented to person, place, and time.   Psychiatric:         Mood and Affect: Mood normal.         Behavior: Behavior normal.          Last Recorded Vitals  Blood pressure 127/66, pulse 88, temperature 36.7 °C (98.1 °F), temperature source Temporal, resp. rate 17, height 1.854 m (6' 1\"), weight 124 kg (272 lb 7.8 oz), SpO2 90%.    Relevant Results  Scheduled medications  azithromycin, 500 mg, oral, Daily  cefTRIAXone, 1 g, intravenous, q24h  enoxaparin, 40 mg, subcutaneous, q24h  influenza, 0.5 mL, intramuscular, During hospitalization  guaiFENesin, 600 mg, oral, BID  ipratropium-albuteroL, 3 mL, nebulization, TID  LORazepam, 1 mg, intravenous, Once  methylPREDNISolone sodium succinate (PF), 40 mg, intravenous, q8h MATY  oxygen, , inhalation, Continuous - Inhalation  pantoprazole, 40 mg, oral, Daily before breakfast  pneumoc 20-aiden conj-dip cr(PF), 0.5 mL, intramuscular, During hospitalization  rosuvastatin, 10 mg, oral, Daily  tamsulosin, 0.8 mg, oral, Daily  tiotropium, 2 puff, inhalation, Daily      Continuous medications     PRN medications  PRN medications: acetaminophen, albuterol, benzocaine-menthol, dextromethorphan-guaifenesin, docusate sodium, ketorolac, lubricating eye drops, melatonin, ondansetron, oxyCODONE, oxyCODONE, polyethylene glycol, simethicone, sodium chloride    Results for orders placed or performed during the hospital encounter of 11/13/24 (from the past 24 hours)   CBC and Auto " Differential   Result Value Ref Range    WBC 10.9 4.4 - 11.3 x10*3/uL    nRBC 0.0 0.0 - 0.0 /100 WBCs    RBC 4.77 4.50 - 5.90 x10*6/uL    Hemoglobin 15.5 13.5 - 17.5 g/dL    Hematocrit 46.2 41.0 - 52.0 %    MCV 97 80 - 100 fL    MCH 32.5 26.0 - 34.0 pg    MCHC 33.5 32.0 - 36.0 g/dL    RDW 12.2 11.5 - 14.5 %    Platelets 166 150 - 450 x10*3/uL    Neutrophils % 78.1 40.0 - 80.0 %    Immature Granulocytes %, Automated 0.5 0.0 - 0.9 %    Lymphocytes % 12.1 13.0 - 44.0 %    Monocytes % 9.0 2.0 - 10.0 %    Eosinophils % 0.1 0.0 - 6.0 %    Basophils % 0.2 0.0 - 2.0 %    Neutrophils Absolute 8.53 (H) 1.60 - 5.50 x10*3/uL    Immature Granulocytes Absolute, Automated 0.05 0.00 - 0.50 x10*3/uL    Lymphocytes Absolute 1.32 0.80 - 3.00 x10*3/uL    Monocytes Absolute 0.98 (H) 0.05 - 0.80 x10*3/uL    Eosinophils Absolute 0.01 0.00 - 0.40 x10*3/uL    Basophils Absolute 0.02 0.00 - 0.10 x10*3/uL   Comprehensive metabolic panel   Result Value Ref Range    Glucose 136 (H) 74 - 99 mg/dL    Sodium 138 136 - 145 mmol/L    Potassium 3.7 3.5 - 5.3 mmol/L    Chloride 99 98 - 107 mmol/L    Bicarbonate 29 21 - 32 mmol/L    Anion Gap 14 10 - 20 mmol/L    Urea Nitrogen 20 6 - 23 mg/dL    Creatinine 1.07 0.50 - 1.30 mg/dL    eGFR 72 >60 mL/min/1.73m*2    Calcium 9.2 8.6 - 10.3 mg/dL    Albumin 3.7 3.4 - 5.0 g/dL    Alkaline Phosphatase 85 33 - 136 U/L    Total Protein 7.4 6.4 - 8.2 g/dL     (H) 9 - 39 U/L    Bilirubin, Total 1.2 0.0 - 1.2 mg/dL    ALT 54 (H) 10 - 52 U/L   B-type natriuretic peptide   Result Value Ref Range     (H) 0 - 99 pg/mL   Troponin I, High Sensitivity, Initial   Result Value Ref Range    Troponin I, High Sensitivity 37 (H) 0 - 20 ng/L   ECG 12 lead   Result Value Ref Range    Ventricular Rate 94 BPM    Atrial Rate 94 BPM    NV Interval 176 ms    QRS Duration 96 ms    QT Interval 350 ms    QTC Calculation(Bazett) 437 ms    P Axis 89 degrees    R Axis 116 degrees    T Axis 35 degrees    QRS Count 16 beats    Q  Onset 213 ms    P Onset 125 ms    P Offset 179 ms    T Offset 388 ms    QTC Fredericia 406 ms   Troponin, High Sensitivity, 1 Hour   Result Value Ref Range    Troponin I, High Sensitivity 39 (H) 0 - 20 ng/L   BLOOD GAS VENOUS FULL PANEL   Result Value Ref Range    POCT pH, Venous 7.42 7.33 - 7.43 pH    POCT pCO2, Venous 47 41 - 51 mm Hg    POCT pO2, Venous 69 (H) 35 - 45 mm Hg    POCT SO2, Venous 94 (H) 45 - 75 %    POCT Oxy Hemoglobin, Venous 90.9 (H) 45.0 - 75.0 %    POCT Hematocrit Calculated, Venous 47.0 41.0 - 52.0 %    POCT Sodium, Venous 136 136 - 145 mmol/L    POCT Potassium, Venous 3.9 3.5 - 5.3 mmol/L    POCT Chloride, Venous 99 98 - 107 mmol/L    POCT Ionized Calicum, Venous 1.15 1.10 - 1.33 mmol/L    POCT Glucose, Venous 127 (H) 74 - 99 mg/dL    POCT Lactate, Venous 1.1 0.4 - 2.0 mmol/L    POCT Base Excess, Venous 4.9 (H) -2.0 - 3.0 mmol/L    POCT HCO3 Calculated, Venous 30.5 (H) 22.0 - 26.0 mmol/L    POCT Hemoglobin, Venous 15.5 13.5 - 17.5 g/dL    POCT Anion Gap, Venous 10.0 10.0 - 25.0 mmol/L    Patient Temperature      FiO2 44 %   Urinalysis with Reflex Culture and Microscopic   Result Value Ref Range    Color, Urine Yellow Light-Yellow, Yellow, Dark-Yellow    Appearance, Urine Turbid (N) Clear    Specific Gravity, Urine >1.050 (N) 1.005 - 1.035    pH, Urine 6.0 5.0, 5.5, 6.0, 6.5, 7.0, 7.5, 8.0    Protein, Urine 100 (2+) (A) NEGATIVE, 10 (TRACE), 20 (TRACE) mg/dL    Glucose, Urine Normal Normal mg/dL    Blood, Urine OVER (3+) (A) NEGATIVE    Ketones, Urine TRACE (A) NEGATIVE mg/dL    Bilirubin, Urine NEGATIVE NEGATIVE    Urobilinogen, Urine 2 (1+) (A) Normal mg/dL    Nitrite, Urine NEGATIVE NEGATIVE    Leukocyte Esterase, Urine 250 Dasha/µL (A) NEGATIVE   Extra Urine Gray Tube   Result Value Ref Range    Extra Tube Hold for add-ons.    Microscopic Only, Urine   Result Value Ref Range    WBC, Urine >50 (A) 1-5, NONE /HPF    RBC, Urine 11-20 (A) NONE, 1-2, 3-5 /HPF    Squamous Epithelial Cells, Urine  1-9 (SPARSE) Reference range not established. /HPF    Mucus, Urine FEW Reference range not established. /LPF    Hyaline Casts, Urine OCCASIONAL (A) NONE /LPF    Fine Granular Casts, Urine 1+ (A) NONE /LPF    Amorphous Crystals, Urine 1+ NONE, 1+, 2+ /HPF   CBC   Result Value Ref Range    WBC 9.6 4.4 - 11.3 x10*3/uL    nRBC 0.0 0.0 - 0.0 /100 WBCs    RBC 4.82 4.50 - 5.90 x10*6/uL    Hemoglobin 15.3 13.5 - 17.5 g/dL    Hematocrit 47.9 41.0 - 52.0 %    MCV 99 80 - 100 fL    MCH 31.7 26.0 - 34.0 pg    MCHC 31.9 (L) 32.0 - 36.0 g/dL    RDW 12.3 11.5 - 14.5 %    Platelets 191 150 - 450 x10*3/uL   Basic metabolic panel   Result Value Ref Range    Glucose 167 (H) 74 - 99 mg/dL    Sodium 140 136 - 145 mmol/L    Potassium 4.0 3.5 - 5.3 mmol/L    Chloride 102 98 - 107 mmol/L    Bicarbonate 29 21 - 32 mmol/L    Anion Gap 13 10 - 20 mmol/L    Urea Nitrogen 25 (H) 6 - 23 mg/dL    Creatinine 1.06 0.50 - 1.30 mg/dL    eGFR 73 >60 mL/min/1.73m*2    Calcium 9.6 8.6 - 10.3 mg/dL        Assessment/Plan   Assessment & Plan  COPD exacerbation (Multi)    COPD with acute exacerbation (Multi) (Resolved: 11/14/2024)    Fall from ladder    Pain of left hip    Acute on chronic hypoxic respiratory failure (Multi)    Acute cystitis without hematuria    Benign prostatic hyperplasia without lower urinary tract symptoms    Other hyperlipidemia      #Fall  #Hip pain  -Fell 6 foot from a ladder 3 weeks ago  -Increasing pain from hips down but left hip primarily  -CT imaging in ED neg for acute fractures  -MRI left hip/pelvis: 1. Severe muscle strain of the left gluteus tomas muscle with areas  of intramuscular hematomas. Moderate muscle strain in the right  gluteus tomas musculature and in the left gluteus medius  musculature.  2. Moderate left trochanteric bursitis which may be hemorrhagic.  3. Moderate muscle edema and strain in the distal abdominal  musculature at their insertion on the pubic symphysis on the left.  Small amount of fluid in  the pubic symphysis as well with marrow  reactive changes. Findings likely posttraumatic given patient's  history  4. Partial-thickness tearing of the left hamstring tendon origin.  Partial-thickness tearing of the left hip abductor tendons. Mild  sprain of the bilateral adductor tendon origin  5. No fracture seen.  -MRI reviewed by Dr. Ortiz with orthopedics at [facility]; does not recommend intervention or transfer  -Will need outpatient follow up   -PT/OT evals pending  -PRN analgesia  -Safety precautions    #COPD exacerbation  #Acute on chronic hypoxic respiratory failure   -CT PE: 1. No pulmonary embolus.  2. Mild centrilobular emphysema.  3. No acute intrathoracic process. No significant interval change  from 11/13/2020  -WBC 10.9  -Supplemental oxygen to maintain an sp02 greater than 92%  -Currently on 4L  -Baseline 2L; concern for non-compliance   -Mucinex BID   -Bronchodilators  -Solumedrol q8  -RT to eval/treat   -PO azithro   -Continue spiriva    #UTI  #BPH  - leuks, >50 WBC  -Urine cx pending  -IV ceftriaxone (Day 2)  -Continue tamsulosin     #HLD  -Continue rosuvastatin    DVT ppx  -lovenox    PUD ppx  -pantoprazole    F: PRN  E: Replete per protocol  N: Regular  A: PIV    Disposition: Pt requires more than 2 inpatient days at this time   Code Status: Full Code       ROSSY Crowder    Attending Attestation:    I was present with the APRN-CNP who participated in the documentation of this note. I have personally seen and re-examined the patient and performed the medical decision-making components (assessment and plan of care). I have reviewed the documentation and verified the findings in the note as written with additions or exceptions as stated in the body of this note.    Patient presented to the office for significant pain in lower back and hip which is going down. He fell from a ladder from the height of 6 feet and landed on hips.  He also found to have shortness of breath because of  COPD and in copd exacerbation. He is going to receive solumedrol for COPD exacerbation.    His UA is positive for UTI so he will going to get IV Ceftriaxone for 7 days.    PT/OT evaluation is needed for the hip pain and fall.  PRN analgesics.     Rajinder Irby MD  Internal Medicine

## 2024-11-14 NOTE — PROGRESS NOTES
Pharmacy Medication History Review    Christiano Cox is a 74 y.o. male admitted for COPD exacerbation (Multi). Pharmacy reviewed the patient's [id number] medications and allergies for accuracy.  Sources used to confirm medication list: Informant interview  Informant: Spouse/Significant Other  Informant credibility: Excellent (Able to recall medication, indication, strength, frequency, and/or prescriber for >90% of medications).    Total number of adjustments: 4     Medications Added Medications Removed Medications Adjusted  Adjustments Made   Vitamin D Aspirin 81 MG DuoNeb QID --> QID PRN     Rosuvastatin Every day --> QPM     The list below reflectives the updated PTA list. Please review each medication in order reconciliation for additional clarification and justification.  Medications Prior to Admission   Medication Sig Dispense Refill Last Dose/Taking    albuterol 90 mcg/actuation inhaler Inhale 2 puffs every 6 hours if needed.   Taking As Needed    rosuvastatin (Crestor) 20 mg tablet Take 0.5 tablets (10 mg) by mouth once daily in the evening.   Taking    tamsulosin (Flomax) 0.4 mg 24 hr capsule Take 2 capsules (0.8 mg) by mouth once daily.   Taking    tiotropium (Spiriva Respimat) 2.5 mcg/actuation inhaler Inhale 2 puffs once daily.   Taking    cholecalciferol (Vitamin D3) 50 MCG (2000 UT) tablet Take 1 tablet (50 mcg) by mouth every other day.       folic acid (Folvite) 1 mg tablet Take 1 tablet (1 mg) by mouth once daily.       ipratropium-albuteroL (Duo-Neb) 0.5-2.5 mg/3 mL nebulizer solution Take 3 mL by nebulization 4 times a day as needed for shortness of breath or wheezing.           The list below reflectives the updated allergy list. Please review each documented allergy for additional clarification and justification.  Allergies  Reviewed by Alina Mendez on 11/14/2024   No Known Allergies         Below are additional concerns with the patient's PTA list.  Spoke w/pt and wife, Sugar, at  bedside. Reviewed PTA and allergy list. Please note changes in the chart above.    Alina Mendez CPhT  Medication History Pharmacy Technician  MJOSE A 8-4:30  Available via [employer] Secure Chat  OR  [phone]

## 2024-11-14 NOTE — CARE PLAN
The patient's goals for the shift include      The clinical goals for the shift include to tolerate antibiotic treatment overnight    Over the shift, the patient did not make progress toward the following goals. Barriers to progression include ***. Recommendations to address these barriers include ***.

## 2024-11-14 NOTE — PROGRESS NOTES
Occupational Therapy    Evaluation/Treatment    Patient Name: Christiano Cox  MRN: [id number]  Department: [facility]  Room: [address] [place], [place] [address]  Today's Date: 11/14/24  Time Calculation  Start Time: 0804  Stop Time: 0845  Time Calculation (min): 41 min       Assessment:  OT Assessment: Pt. is a 74 y.o. male referred to occupational therapy for imapaired self-care 2/2 admisison for COPD exacerbation. Pt. displays decreased self-care, mobility, transfers, and safety awareness. Pt. would benefit from skilled OT at MOD intensity to address above deficits and return to PLOF in LRE.  Prognosis: Good  Barriers to Discharge: Decreased caregiver support (spouse works)  Evaluation/Treatment Tolerance: Patient tolerated treatment well  Medical Staff Made Aware: Yes  End of Session Communication: Bedside nurse  End of Session Patient Position: Bed, 2 rail up, Alarm off, caregiver present (aware alarm is not on and stated she would turn on prior to exiting room.)  OT Assessment Results: Decreased ADL status, Decreased endurance, Decreased safe judgment during ADL, Decreased functional mobility, Decreased sensation, Decreased IADLs  Prognosis: Good  Barriers to Discharge: Decreased caregiver support (spouse works)  Evaluation/Treatment Tolerance: Patient tolerated treatment well  Medical Staff Made Aware: Yes  Strengths: Ability to acquire knowledge  Plan:  Treatment Interventions: ADL retraining, Functional transfer training, Endurance training, Patient/family training, Neuromuscular reeducation, Equipment evaluation/education, Compensatory technique education  OT Frequency: 3 times per week  OT Discharge Recommendations: Moderate intensity level of continued care  OT Recommended Transfer Status: Assist of 1, Moderate assist  OT - OK to Discharge: Yes (Based on completed evaluation and care plan recommendations, no barriers to discharge to next site of care)  Treatment Interventions: ADL retraining, Functional transfer training, Endurance  training, Patient/family training, Neuromuscular reeducation, Equipment evaluation/education, Compensatory technique education    Subjective   Current Problem:  1. COPD with acute exacerbation (Multi)        2. Hypoxia        3. Fall, initial encounter          General:   OT Received On: 11/14/24  General  Reason for Referral: impaired self-care d/t admission for COPD exacerbation  Referred By: Evelina Davila MD  Past Medical History Relevant to Rehab: COPD; recent fall 2 wks ago with residual L lower back and leg pain with numbness  Family/Caregiver Present: No  Prior to Session Communication: Bedside nurse  Patient Position Received: Bed, 2 rail up, Alarm on  General Comment: dora das,  Precautions:  Hearing/Visual Limitations: minimally [place]  Medical Precautions: Fall precautions, Oxygen therapy device and L/min (4LPM)  Precautions Comment: pt reports he doesn't usually wear day O2    Vital Sign (Past 2hrs)        Date/Time Vitals Session Patient Position Pulse Resp SpO2 BP MAP (mmHg)    11/14/24 1008 --  --  --  --  92 %  --  --                   Pain:  Pain Assessment  Pain Assessment: 0-10  0-10 (Numeric) Pain Score: 1 (reports increased with movement but not a 10; did not give another number for rating)  Pain Type: Acute pain  Pain Location: Back (Hip)  Pain Orientation: Left, Lower  Pain Radiating Towards: Hip/LE  Pain Descriptors: Numbness    Objective   Cognition:  Overall Cognitive Status: Within Functional Limits  Arousal/Alertness: Appropriate responses to stimuli  Orientation Level: Disoriented to time (stated the year was 2005)    Home Living:  Type of Home: House  Lives With: Spouse  Home Adaptive Equipment: Walker rolling or standard  Home Layout: One level  Home Access: Stairs to enter without rails  Entrance Stairs-Rails: None  Entrance Stairs-Number of Steps: 5  Bathroom Shower/Tub: Tub/shower unit, Walk-in shower (has both)  Bathroom Toilet: Standard  Bathroom Equipment: Grab bars in  shower (in tub shower only)  Prior Function:  Level of Hansen: Independent with ADLs and functional transfers, Independent with homemaking with ambulation  Receives Help From: Family  ADL Assistance: Independent (reports he does not put his own socks on)  Homemaking Assistance: Independent  Ambulatory Assistance: Independent (since fall ~ 2 wks ago, has been using FWW)  Vocational:  (semi-retired; not currently working)  IADL History:  Homemaking Responsibilities: Yes  Meal Prep Responsibility: Secondary  Laundry Responsibility: Secondary  Cleaning Responsibility: Secondary  Bill Paying/Finance Responsibility: Secondary  Shopping Responsibility: Secondary  Current License: Yes  ADL:  Eating Assistance: Independent (anticipated)  Grooming Assistance: Stand by (anticipated)  Bathing Assistance: Moderate  Bathing Deficit: Perineal area, Buttocks, Right lower leg including foot, Left lower leg including foot (back)  UE Dressing Assistance: Minimal  UE Dressing Deficit: Fasteners  LE Dressing Assistance: Total  LE Dressing Deficit: Don/doff R sock, Don/doff L sock, Thread RLE into underwear, Thread LLE into underwear, Pull up over hips  Toileting Assistance with Device: Total  Toileting Deficit: Use of adaptive equipment (TryLifeerwick)  Activities of Daily Living: UE Bathing  UE Bathing Level of Assistance: Minimum assistance  UE Bathing Where Assessed:  (chair)  UE Bathing Comments: sponge bath completed; assistance to wash back    LE Bathing  LE Bathing Level of Assistance: Moderate assistance  LE Bathing Where Assessed:  (chair)  LE Bathing Comments: sponge bath completed; assistance to wash bottom, periareas, and distal LEs    UE Dressing  UE Dressing Level of Assistance: Minimum assistance  UE Dressing Where Assessed: Chair  UE Dressing Comments: assistance with fasteners    LE Dressing  LE Dressing: Yes  Sock Level of Assistance: Dependent  Adult Briefs Level of Assistance: Dependent  LE Dressing Where Assessed:  "Chair  LE Dressing Comments: pt. reports he is uanble to lean forward to don socks    Toileting  Toileting Comments: total with purewick; not aware of incontinence mess  Activity Tolerance:  Endurance: Decreased tolerance for upright activites  Bed Mobility/Transfers: Bed Mobility  Bed Mobility: Yes  Bed Mobility 1  Bed Mobility 1: Supine to sitting  Level of Assistance 1: Moderate assistance  Bed Mobility Comments 1: unable to progress LLE  Bed Mobility 2  Bed Mobility  2: Sitting to supine  Level of Assistance 2: Moderate assistance  Bed Mobility Comments 2: assistance with BLE    Transfers  Transfer: Yes  Transfer 1  Transfer From 1: Bed to  Transfer to 1: Chair with arms  Technique 1: Sit to stand, Stand to sit  Transfer Device 1: Walker, Gait belt  Transfer Level of Assistance 1: Minimum assistance  Trials/Comments 1: MAX cueing for hand placement without follow through  Transfers 2  Transfer From 2: Chair with arms to  Transfer to 2: Bed  Technique 2: Sit to stand, Stand to sit  Transfer Device 2: Walker, Gait belt  Transfer Level of Assistance 2: Minimum assistance  Trials/Comments 2: Max cueing for hand placement; pt. followed commands with incresased success this trial    Functional Mobility:  Functional Mobility  Functional Mobility Performed: Yes  Functional Mobility 1  Surface 1: Level tile  Device 1: Rolling walker  Functional Mobility Support Devices: Gait belt  Assistance 1: Minimum assistance  Quality of Functional Mobility 1: Wide base of support  Comments 1: poor progression of LLE; heavy use of UEs for support; pt. reports increased difficulty with change in direction; pt. reported \"shakiness\"  Sitting Balance:  Static Sitting Balance  Static Sitting-Balance Support: Feet supported  Static Sitting-Level of Assistance: Independent  Dynamic Sitting Balance  Dynamic Sitting-Balance Support: Feet supported  Dynamic Sitting-Level of Assistance: Minimum assistance  Dynamic Sitting-Balance: Trunk control " activities  Standing Balance:  Static Standing Balance  Static Standing-Balance Support: Bilateral upper extremity supported  Static Standing-Level of Assistance: Minimum assistance  Dynamic Standing Balance  Dynamic Standing-Balance Support: Bilateral upper extremity supported  Dynamic Standing-Level of Assistance: Minimum assistance  Dynamic Standing-Balance: Turning  Sensation:  Sensation Comment: reports numbness in LLE and foot  Strength:  Strength Comments: BUE: 5/5 grossly throughout  Coordination:  Movements are Fluid and Coordinated: Yes   Hand Function:  Hand Function  Gross Grasp: Functional  Coordination: Functional  Extremities: RUE   RUE : Within Functional Limits and LUE   LUE: Within Functional Limits    Outcome Measures: [facility] Daily Activity  Putting on and taking off regular lower body clothing: Total  Bathing (including washing, rinsing, drying): A lot  Putting on and taking off regular upper body clothing: A little  Toileting, which includes using toilet, bedpan or urinal: Total  Taking care of personal grooming such as brushing teeth: A little  Eating Meals: None  Daily Activity - Total Score: 14    Education Documentation  Body Mechanics, taught by Iveth Sepulveda OT at 11/14/2024  9:18 AM.  Learner: Patient  Readiness: Acceptance  Method: Explanation  Response: Needs Reinforcement  Comment: sharif on POC. Edu on transfers and safety with hand placement    ADL Training, taught by Iveth Sepulveda OT at 11/14/2024  9:18 AM.  Learner: Patient  Readiness: Acceptance  Method: Explanation  Response: Needs Reinforcement  Comment: edu on POC. Edu on transfers and safety with hand placement    Education Comments  No comments found.      Goals:  Encounter Problems       Encounter Problems (Active)       ADLs       Patient will perform UB and LB bathing with stand by assist level of assistance.       Start:  11/14/24    Expected End:  11/28/24            Patient with complete upper body dressing with modified  independent level of assistance donning and doffing all UE clothes with PRN adaptive equipment while supported sitting and edge of bed        Start:  11/14/24    Expected End:  11/28/24            Patient with complete lower body dressing with minimal assist  level of assistance donning and doffing all LE clothes  with reacher, shoe horn, and sock-aid while supported sitting and edge of bed        Start:  11/14/24    Expected End:  11/28/24            Patient will complete daily grooming tasks brushing teeth, shaving, and washing face/hair with modified independent level of assistance and PRN adaptive equipment while standing.       Start:  11/14/24    Expected End:  11/28/24            Patient will complete toileting including hygiene clothing management/hygiene with minimal assist  level of assistance.       Start:  11/14/24    Expected End:  11/28/24               MOBILITY       Patient will perform Functional mobility min Household distances/Community Distances with set-up and supervision level of assistance and least restrictive device in order to improve safety and functional mobility.       Start:  11/14/24    Expected End:  11/28/24               TRANSFERS       Patient will perform bed mobility stand by assist level of assistance in order to improve safety and independence with mobility       Start:  11/14/24    Expected End:  11/28/24            Patient will complete functional transfer to bed, chair, commode with front wheeled walker with set-up and supervision level of assistance.       Start:  11/14/24    Expected End:  11/28/24

## 2024-11-15 LAB
ANION GAP SERPL CALC-SCNC: 12 MMOL/L (ref 10–20)
ATRIAL RATE: 94 BPM
BUN SERPL-MCNC: 40 MG/DL (ref 6–23)
CALCIUM SERPL-MCNC: 9.2 MG/DL (ref 8.6–10.3)
CHLORIDE SERPL-SCNC: 96 MMOL/L (ref 98–107)
CO2 SERPL-SCNC: 32 MMOL/L (ref 21–32)
CREAT SERPL-MCNC: 1.15 MG/DL (ref 0.5–1.3)
EGFRCR SERPLBLD CKD-EPI 2021: 66 ML/MIN/1.73M*2
ERYTHROCYTE [DISTWIDTH] IN BLOOD BY AUTOMATED COUNT: 12.2 % (ref 11.5–14.5)
GLUCOSE SERPL-MCNC: 177 MG/DL (ref 74–99)
HCT VFR BLD AUTO: 43.8 % (ref 41–52)
HGB BLD-MCNC: 14 G/DL (ref 13.5–17.5)
MCH RBC QN AUTO: 31.7 PG (ref 26–34)
MCHC RBC AUTO-ENTMCNC: 32 G/DL (ref 32–36)
MCV RBC AUTO: 99 FL (ref 80–100)
NRBC BLD-RTO: 0 /100 WBCS (ref 0–0)
P AXIS: 59 DEGREES
P OFFSET: 175 MS
P ONSET: 121 MS
PLATELET # BLD AUTO: 208 X10*3/UL (ref 150–450)
POTASSIUM SERPL-SCNC: 3.9 MMOL/L (ref 3.5–5.3)
PR INTERVAL: 176 MS
Q ONSET: 209 MS
QRS COUNT: 16 BEATS
QRS DURATION: 100 MS
QT INTERVAL: 350 MS
QTC CALCULATION(BAZETT): 437 MS
QTC FREDERICIA: 406 MS
R AXIS: 3 DEGREES
RBC # BLD AUTO: 4.41 X10*6/UL (ref 4.5–5.9)
SODIUM SERPL-SCNC: 136 MMOL/L (ref 136–145)
T AXIS: 36 DEGREES
T OFFSET: 384 MS
VENTRICULAR RATE: 94 BPM
WBC # BLD AUTO: 11.9 X10*3/UL (ref 4.4–11.3)

## 2024-11-15 PROCEDURE — 94640 AIRWAY INHALATION TREATMENT: CPT | Mod: IPSPLIT

## 2024-11-15 PROCEDURE — 94669 MECHANICAL CHEST WALL OSCILL: CPT | Mod: IPSPLIT

## 2024-11-15 PROCEDURE — 80048 BASIC METABOLIC PNL TOTAL CA: CPT | Mod: IPSPLIT

## 2024-11-15 PROCEDURE — 97161 PT EVAL LOW COMPLEX 20 MIN: CPT | Mod: GP,IPSPLIT

## 2024-11-15 PROCEDURE — 2500000004 HC RX 250 GENERAL PHARMACY W/ HCPCS (ALT 636 FOR OP/ED): Mod: IPSPLIT

## 2024-11-15 PROCEDURE — 2500000004 HC RX 250 GENERAL PHARMACY W/ HCPCS (ALT 636 FOR OP/ED): Mod: IPSPLIT | Performed by: NURSE PRACTITIONER

## 2024-11-15 PROCEDURE — 94640 AIRWAY INHALATION TREATMENT: CPT

## 2024-11-15 PROCEDURE — 36415 COLL VENOUS BLD VENIPUNCTURE: CPT | Mod: IPSPLIT

## 2024-11-15 PROCEDURE — 2500000001 HC RX 250 WO HCPCS SELF ADMINISTERED DRUGS (ALT 637 FOR MEDICARE OP): Mod: IPSPLIT | Performed by: NURSE PRACTITIONER

## 2024-11-15 PROCEDURE — 2500000002 HC RX 250 W HCPCS SELF ADMINISTERED DRUGS (ALT 637 FOR MEDICARE OP, ALT 636 FOR OP/ED): Mod: IPSPLIT | Performed by: STUDENT IN AN ORGANIZED HEALTH CARE EDUCATION/TRAINING PROGRAM

## 2024-11-15 PROCEDURE — 94668 MNPJ CHEST WALL SBSQ: CPT | Mod: IPSPLIT

## 2024-11-15 PROCEDURE — 99232 SBSQ HOSP IP/OBS MODERATE 35: CPT | Performed by: NURSE PRACTITIONER

## 2024-11-15 PROCEDURE — 2500000005 HC RX 250 GENERAL PHARMACY W/O HCPCS: Mod: IPSPLIT | Performed by: NURSE PRACTITIONER

## 2024-11-15 PROCEDURE — 85027 COMPLETE CBC AUTOMATED: CPT | Mod: IPSPLIT

## 2024-11-15 PROCEDURE — 2500000002 HC RX 250 W HCPCS SELF ADMINISTERED DRUGS (ALT 637 FOR MEDICARE OP, ALT 636 FOR OP/ED): Mod: IPSPLIT | Performed by: NURSE PRACTITIONER

## 2024-11-15 PROCEDURE — 9420000001 HC RT PATIENT EDUCATION 5 MIN: Mod: IPSPLIT

## 2024-11-15 PROCEDURE — 2500000001 HC RX 250 WO HCPCS SELF ADMINISTERED DRUGS (ALT 637 FOR MEDICARE OP): Mod: IPSPLIT | Performed by: STUDENT IN AN ORGANIZED HEALTH CARE EDUCATION/TRAINING PROGRAM

## 2024-11-15 PROCEDURE — 94760 N-INVAS EAR/PLS OXIMETRY 1: CPT | Mod: IPSPLIT

## 2024-11-15 PROCEDURE — 1100000001 HC PRIVATE ROOM DAILY: Mod: IPSPLIT

## 2024-11-15 RX ORDER — CHOLECALCIFEROL (VITAMIN D3) 25 MCG
2000 TABLET ORAL EVERY OTHER DAY
Status: DISCONTINUED | OUTPATIENT
Start: 2024-11-15 | End: 2024-11-16 | Stop reason: HOSPADM

## 2024-11-15 RX ORDER — FOLIC ACID 1 MG/1
1 TABLET ORAL DAILY
Status: DISCONTINUED | OUTPATIENT
Start: 2024-11-15 | End: 2024-11-16 | Stop reason: HOSPADM

## 2024-11-15 ASSESSMENT — COGNITIVE AND FUNCTIONAL STATUS - GENERAL
TURNING FROM BACK TO SIDE WHILE IN FLAT BAD: A LITTLE
STANDING UP FROM CHAIR USING ARMS: A LITTLE
TURNING FROM BACK TO SIDE WHILE IN FLAT BAD: A LITTLE
HELP NEEDED FOR BATHING: A LITTLE
MOVING TO AND FROM BED TO CHAIR: A LOT
WALKING IN HOSPITAL ROOM: A LITTLE
CLIMB 3 TO 5 STEPS WITH RAILING: A LOT
MOBILITY SCORE: 18
CLIMB 3 TO 5 STEPS WITH RAILING: A LITTLE
DAILY ACTIVITIY SCORE: 19
MOBILITY SCORE: 17
DRESSING REGULAR LOWER BODY CLOTHING: A LITTLE
DRESSING REGULAR UPPER BODY CLOTHING: A LITTLE
PERSONAL GROOMING: A LITTLE
MOVING FROM LYING ON BACK TO SITTING ON SIDE OF FLAT BED WITH BEDRAILS: A LITTLE
MOVING TO AND FROM BED TO CHAIR: A LITTLE
STANDING UP FROM CHAIR USING ARMS: A LITTLE
WALKING IN HOSPITAL ROOM: A LITTLE
TOILETING: A LITTLE

## 2024-11-15 ASSESSMENT — PAIN DESCRIPTION - ORIENTATION: ORIENTATION: LEFT

## 2024-11-15 ASSESSMENT — PAIN - FUNCTIONAL ASSESSMENT
PAIN_FUNCTIONAL_ASSESSMENT: 0-10

## 2024-11-15 ASSESSMENT — PAIN SCALES - PAIN ASSESSMENT IN ADVANCED DEMENTIA (PAINAD): TOTALSCORE: MEDICATION (SEE MAR)

## 2024-11-15 ASSESSMENT — PAIN DESCRIPTION - DESCRIPTORS: DESCRIPTORS: ACHING

## 2024-11-15 ASSESSMENT — PAIN SCALES - GENERAL
PAINLEVEL_OUTOF10: 6
PAINLEVEL_OUTOF10: 4
PAINLEVEL_OUTOF10: 0 - NO PAIN
PAINLEVEL_OUTOF10: 0 - NO PAIN

## 2024-11-15 ASSESSMENT — PAIN DESCRIPTION - LOCATION
LOCATION: BACK
LOCATION: BACK

## 2024-11-15 NOTE — PROGRESS NOTES
Christiano Cox is a 74 y.o. male on day 1 of admission presenting with COPD exacerbation (Multi).      Subjective   Patient assessed at bedside; lying in bed. He complained left thigh pain. He is on oxygen 5lpm via [place]; normally wears 2 lpm at home. He reports he is going home tomorrow even is he has to sign out AMA.       Objective     Last Recorded Vitals  /65 (BP Location: Left arm, Patient Position: Lying)   Pulse 72   Temp 37 °C (98.6 °F) (Temporal)   Resp 19   Wt 124 kg (272 lb 7.8 oz)   SpO2 90%   Intake/Output last 3 Shifts:    Intake/Output Summary (Last 24 hours) at 11/15/2024 1147  Last data filed at 11/15/2024 0900  Gross per 24 hour   Intake 1320 ml   Output 1200 ml   Net 120 ml       Admission Weight  Weight: 120 kg (265 lb) (11/13/24 1316)    Daily Weight  11/13/24 : 124 kg (272 lb 7.8 oz)    Image Results      Physical Exam  Vitals reviewed.   Constitutional:       Appearance: Normal appearance. He is obese.   HENT:      Head: Normocephalic and atraumatic.      Right Ear: External ear normal.      Left Ear: External ear normal.      Nose: Nose normal.      Mouth/Throat:      Mouth: Mucous membranes are moist.      Pharynx: Oropharynx is clear.   Eyes:      Conjunctiva/sclera: Conjunctivae normal.      Pupils: Pupils are equal, round, and reactive to light.   Cardiovascular:      Rate and Rhythm: Normal rate and regular rhythm.      Pulses: Normal pulses.      Heart sounds: Normal heart sounds.   Pulmonary:      Effort: Pulmonary effort is normal.      Breath sounds: Wheezing and rhonchi present.   Abdominal:      General: Bowel sounds are normal.      Palpations: Abdomen is soft.   Musculoskeletal:         General: Normal range of motion.      Cervical back: Normal range of motion and neck supple.   Skin:     General: Skin is warm and dry.   Neurological:      General: No focal deficit present.      Mental Status: He is alert and oriented to person, place, and time.   Psychiatric:          Mood and Affect: Mood normal.         Behavior: Behavior normal.         Relevant Results    Scheduled medications  cefTRIAXone, 1 g, intravenous, q24h  cholecalciferol, 2,000 Units, oral, Every other day  enoxaparin, 40 mg, subcutaneous, q24h  influenza, 0.5 mL, intramuscular, During hospitalization  folic acid, 1 mg, oral, Daily  guaiFENesin, 600 mg, oral, BID  ipratropium-albuteroL, 3 mL, nebulization, TID  methylPREDNISolone sodium succinate (PF), 40 mg, intravenous, q8h MATY  oxygen, , inhalation, Continuous - Inhalation  pantoprazole, 40 mg, oral, Daily before breakfast  pneumoc 20-aiden conj-dip cr(PF), 0.5 mL, intramuscular, During hospitalization  rosuvastatin, 10 mg, oral, Daily  tamsulosin, 0.8 mg, oral, Daily  tiotropium, 2 puff, inhalation, Daily      Continuous medications     PRN medications  PRN medications: acetaminophen, albuterol, benzocaine-menthol, dextromethorphan-guaifenesin, docusate sodium, ketorolac, lubricating eye drops, melatonin, ondansetron, oxyCODONE, oxyCODONE, polyethylene glycol, simethicone, sodium chloride    Results for orders placed or performed during the hospital encounter of 11/13/24 (from the past 24 hours)   CBC   Result Value Ref Range    WBC 11.9 (H) 4.4 - 11.3 x10*3/uL    nRBC 0.0 0.0 - 0.0 /100 WBCs    RBC 4.41 (L) 4.50 - 5.90 x10*6/uL    Hemoglobin 14.0 13.5 - 17.5 g/dL    Hematocrit 43.8 41.0 - 52.0 %    MCV 99 80 - 100 fL    MCH 31.7 26.0 - 34.0 pg    MCHC 32.0 32.0 - 36.0 g/dL    RDW 12.2 11.5 - 14.5 %    Platelets 208 150 - 450 x10*3/uL   Basic Metabolic Panel   Result Value Ref Range    Glucose 177 (H) 74 - 99 mg/dL    Sodium 136 136 - 145 mmol/L    Potassium 3.9 3.5 - 5.3 mmol/L    Chloride 96 (L) 98 - 107 mmol/L    Bicarbonate 32 21 - 32 mmol/L    Anion Gap 12 10 - 20 mmol/L    Urea Nitrogen 40 (H) 6 - 23 mg/dL    Creatinine 1.15 0.50 - 1.30 mg/dL    eGFR 66 >60 mL/min/1.73m*2    Calcium 9.2 8.6 - 10.3 mg/dL                   Assessment/Plan        Assessment &  Plan  COPD exacerbation (Multi)    Fall from ladder    Pain of left hip    Acute on chronic hypoxic respiratory failure (Multi)    Acute cystitis without hematuria    Benign prostatic hyperplasia without lower urinary tract symptoms    Other hyperlipidemia    Fall  Left Hip pain  -Fell 6 foot from a ladder 3 weeks ago  -Increasing pain from hips down but left hip primarily  -CT imaging in ED neg for acute fractures  -MRI left hip/pelvis: 1. Severe muscle strain of the left gluteus tomas muscle with areas of intramuscular hematomas. Moderate muscle strain in the right gluteus tomas musculature and in the left gluteus medius musculature. Moderate left trochanteric bursitis which may be hemorrhagic. Moderate muscle edema and strain in the distal abdominal musculature at their insertion on the pubic symphysis on the left. Small amount of fluid in the pubic symphysis as well with marrow reactive changes. Findings likely posttraumatic given patient's  History Partial-thickness tearing of the left hamstring tendon origin.  Partial-thickness tearing of the left hip abductor tendons. Mild  sprain of the bilateral adductor tendon origin No fracture seen.  -MRI reviewed by Dr. Ortiz with orthopedics at [facility]; does not recommend intervention or transfer  -Will need outpatient follow up   -PT/OT evaluation; recommended moderate level therapy; patient is refusing  -PRN analgesia  -Safety precautions     COPD exacerbation  Acute on chronic hypoxic respiratory failure   -CT PE: 1. No pulmonary embolus. Mild centrilobular emphysema.  No acute intrathoracic process. No significant interval change  from 11/13/2020  -WBC 10.9  -Supplemental oxygen to maintain an sp02 greater than 92%  -Currently on 4L  -Baseline 2L; concern for non-compliance   -continue guaifenesin 600 mg BID   -continue duoneb q6h  -continue Solumedrol q12h  -RT to eval/treat   -completed azithromycin 500 mg daily  -Continue tiotropium 2.5 mcg daily     Acute  cystitis without hematuria  BPH with urinary symptoms  - leuks, >50 WBC  -Urine culture grew gram negative bacilli  -continue IV ceftriaxone1 g daily (Day 3)  -Continue tamsulosin 0.8 mg daily     HLD  -Continue rosuvastatin 10 mg daily     DVT ppx  -continue enoxaparin 40 mg daily     PUD ppx  -continue pantoprazole 40 mg daily     Code Status: Full Code      Disposition: Pt requires more than 2 inpatient days at this time                 Farhana Mckinnon, APRN-CNP

## 2024-11-15 NOTE — PROGRESS NOTES
11/15/24 1220   Discharge Planning   Living Arrangements Spouse/significant other   Support Systems Spouse/significant other   Assistance Needed spoke with patient/spouse about discharge planning, patient agreed to [facility], chose [facility], referral sent.   Type of Residence Private residence   Home or Post Acute Services In home services   Expected Discharge Disposition Home H   Does the patient need discharge transport arranged? No     Referral sent to [facility], awaiting acceptance.    1509 dc 11/16, [facility] is [facility], [facility], I faxed [facility] [phone] PT/OT orders per their request. Latrice Huerta PCP at [facility]. I told patient/spouse that homecare may not be set up until Monday or Tuesday, they are ok with that.

## 2024-11-15 NOTE — PROGRESS NOTES
Physical Therapy                 Therapy Communication Note    Patient Name: Christiano Cox  MRN: [id number]  Department: [facility]  Room: 207/207-A  Today's Date: 11/15/2024     Discipline: Physical Therapy    Missed Visit Reason: Missed Visit Reason: Patient refused    Missed Time: Attempt at 0810    Comment: Pt refusing at this time, requesting PT return following completion of his breakfast. Nursing aware.

## 2024-11-15 NOTE — NURSING NOTE
Pt continued to tolerate iv solumedrol and iv rocephin without adverse effects. Able to wean 02 down to 3l from the 5l he was at the start of the shift. Ambulated to bathroom with mile AGUILERA but so02 remained above 89%. Wife present at bedside majority of the day.

## 2024-11-15 NOTE — CARE PLAN
The patient's goals for the shift include  pain control    The clinical goals for the shift include MRI today, control pain this shift      Problem: Pain - Adult  Goal: Verbalizes/displays adequate comfort level or baseline comfort level  Outcome: Progressing     Problem: Discharge Planning  Goal: Discharge to home or other facility with appropriate resources  Outcome: Progressing     Problem: Chronic Conditions and Co-morbidities  Goal: Patient's chronic conditions and co-morbidity symptoms are monitored and maintained or improved  Outcome: Progressing     Problem: Pain  Goal: Takes deep breaths with improved pain control throughout the shift  Outcome: Progressing  Goal: Turns in bed with improved pain control throughout the shift  Outcome: Progressing  Goal: Walks with improved pain control throughout the shift  Outcome: Progressing  Goal: Performs ADL's with improved pain control throughout shift  Outcome: Progressing  Goal: Participates in PT with improved pain control throughout the shift  Outcome: Progressing  Goal: Free from opioid side effects throughout the shift  Outcome: Progressing  Goal: Free from acute confusion related to pain meds throughout the shift  Outcome: Progressing     Problem: Skin  Goal: Decreased wound size/increased tissue granulation at next dressing change  Outcome: Progressing  Flowsheets (Taken 11/14/2024 1641 by Yojana Saravia RN)  Decreased wound size/increased tissue granulation at next dressing change: Promote sleep for wound healing  Goal: Participates in plan/prevention/treatment measures  Outcome: Progressing  Flowsheets (Taken 11/14/2024 2333)  Participates in plan/prevention/treatment measures:   Discuss with provider PT/OT consult   Elevate heels  Goal: Prevent/manage excess moisture  Outcome: Progressing  Goal: Prevent/minimize sheer/friction injuries  Outcome: Progressing  Goal: Promote/optimize nutrition  Outcome: Progressing  Goal: Promote skin healing  Outcome:  Progressing     Problem: Fall/Injury  Goal: Verbalize understanding of personal risk factors for fall in the hospital  Outcome: Progressing  Goal: Verbalize understanding of risk factor reduction measures to prevent injury from fall in the home  Outcome: Progressing  Goal: Use assistive devices by end of the shift  Outcome: Progressing  Goal: Pace activities to prevent fatigue by end of the shift  Outcome: Progressing     Problem: Respiratory  Goal: Clear secretions with interventions this shift  Outcome: Progressing  Goal: Minimize anxiety/maximize coping throughout shift  Outcome: Progressing  Goal: Minimal/no exertional discomfort or dyspnea this shift  Outcome: Progressing  Goal: No signs of respiratory distress (eg. Use of accessory muscles. Peds grunting)  Outcome: Progressing  Goal: Patent airway maintained this shift  Outcome: Progressing  Goal: Tolerate mechanical ventilation evidenced by VS/agitation level this shift  Outcome: Progressing  Goal: Tolerate pulmonary toileting this shift  Outcome: Progressing  Goal: Verbalize decreased shortness of breath this shift  Outcome: Progressing  Goal: Wean oxygen to maintain O2 saturation per order/standard this shift  Outcome: Progressing  Goal: Increase self care and/or family involvement in next 24 hours  Outcome: Progressing

## 2024-11-15 NOTE — CARE PLAN
Problem: Pain - Adult  Goal: Verbalizes/displays adequate comfort level or baseline comfort level  Outcome: Progressing     Problem: Discharge Planning  Goal: Discharge to home or other facility with appropriate resources  Outcome: Progressing     Problem: Chronic Conditions and Co-morbidities  Goal: Patient's chronic conditions and co-morbidity symptoms are monitored and maintained or improved  Outcome: Progressing     Problem: Pain  Goal: Takes deep breaths with improved pain control throughout the shift  Outcome: Progressing  Goal: Turns in bed with improved pain control throughout the shift  Outcome: Progressing  Goal: Walks with improved pain control throughout the shift  Outcome: Progressing  Goal: Performs ADL's with improved pain control throughout shift  Outcome: Progressing  Goal: Participates in PT with improved pain control throughout the shift  Outcome: Progressing  Goal: Free from opioid side effects throughout the shift  Outcome: Progressing  Goal: Free from acute confusion related to pain meds throughout the shift  Outcome: Progressing     Problem: Skin  Goal: Decreased wound size/increased tissue granulation at next dressing change  Outcome: Progressing  Goal: Participates in plan/prevention/treatment measures  Outcome: Progressing  Flowsheets (Taken 11/15/2024 1090)  Participates in plan/prevention/treatment measures:   Discuss with provider PT/OT consult   Increase activity/out of bed for meals   Elevate heels  Goal: Prevent/manage excess moisture  Outcome: Progressing  Goal: Prevent/minimize sheer/friction injuries  Outcome: Progressing  Goal: Promote/optimize nutrition  Outcome: Progressing  Goal: Promote skin healing  Outcome: Progressing     Problem: Fall/Injury  Goal: Verbalize understanding of personal risk factors for fall in the hospital  Outcome: Progressing  Goal: Verbalize understanding of risk factor reduction measures to prevent injury from fall in the home  Outcome:  Progressing  Goal: Use assistive devices by end of the shift  Outcome: Progressing  Goal: Pace activities to prevent fatigue by end of the shift  Outcome: Progressing     Problem: Respiratory  Goal: Clear secretions with interventions this shift  Outcome: Progressing  Goal: Minimize anxiety/maximize coping throughout shift  Outcome: Progressing  Goal: Minimal/no exertional discomfort or dyspnea this shift  Outcome: Progressing  Goal: No signs of respiratory distress (eg. Use of accessory muscles. Peds grunting)  Outcome: Progressing  Goal: Patent airway maintained this shift  Outcome: Progressing  Goal: Tolerate mechanical ventilation evidenced by VS/agitation level this shift  Outcome: Progressing  Goal: Tolerate pulmonary toileting this shift  Outcome: Progressing  Goal: Verbalize decreased shortness of breath this shift  Outcome: Progressing  Goal: Wean oxygen to maintain O2 saturation per order/standard this shift  Outcome: Progressing  Goal: Increase self care and/or family involvement in next 24 hours  Outcome: Progressing   The patient's goals for the shift include  Get washed up    The clinical goals for the shift include Control pain

## 2024-11-15 NOTE — PROGRESS NOTES
Physical Therapy    Physical Therapy Evaluation    Patient Name: Christiano Cox  MRN: [id number]  Department: [facility]  Room: [address] [place], [place] [address]  Today's Date: 11/15/2024   Time Calculation  Start Time: 0850  Stop Time: 0910  Time Calculation (min): 20 min    Assessment/Plan   PT Assessment  PT Assessment Results: Decreased strength, Decreased endurance, Decreased range of motion, Impaired balance, Decreased mobility, Pain  Rehab Prognosis: Fair  Barriers to Discharge: none at this time  Evaluation/Treatment Tolerance: Patient tolerated treatment well, Patient limited by pain  Medical Staff Made Aware: Yes  Strengths: Attitude of self  Barriers to Participation: Comorbidities  End of Session Communication: Bedside nurse  Assessment Comment: Pt is a 75 y/o F seen for PT evaluation following aqdmission for pain following a fall. MRI revealed mult BLE tears and muscle strains in glutes, abd/adductors. Pt is  demonstrating deficits in strength, ROM, endurance, balance, and general mobility. Pt requires skilled PT intervention to address deficits and prevent decline while hospitalized.  End of Session Patient Position: Up in chair, Alarm on  IP OR SWING BED PT PLAN  Inpatient or Swing Bed: Inpatient  PT Plan  Treatment/Interventions: Bed mobility, Transfer training, Gait training, Stair training, Balance training, Neuromuscular re-education, Therapeutic exercise, Therapeutic activity, Strengthening, Endurance training, Home exercise program  PT Plan: Ongoing PT  PT Frequency: 3 times per week  PT Discharge Recommendations: Moderate intensity level of continued care  Equipment Recommended upon Discharge: Wheeled walker (pt owns FWW)  PT Recommended Transfer Status: Contact guard  PT - OK to Discharge: Yes Based on completed evaluation and care plan recommendations, no barriers to discharge to next site of care     Subjective   General Visit Information:  General  Reason for Referral: impaired mobility and gait difficulty  Referred By:  Evelina Davila MD  Past Medical History Relevant to Rehab: COPD; recent fall 2 wks ago with residual L lower back and leg pain with numbness  Missed Visit: Yes  Missed Visit Reason: Patient refused  Prior to Session Communication: PCT/NA/CTA  Patient Position Received: Bed, 2 rail up, Alarm off, not on at start of session  General Comment: Pt pleasant and agreeable to participate, cleared by nursing, left w call bell in reach  Home Living:  Home Living  Type of Home: House  Lives With: Spouse  Home Adaptive Equipment: Walker rolling or standard  Home Layout: One level  Home Access: Stairs to enter without rails  Entrance Stairs-Rails: None  Entrance Stairs-Number of Steps: 5  Bathroom Shower/Tub: Tub/shower unit, Walk-in shower  Bathroom Toilet: Standard  Bathroom Equipment: Grab bars in shower  Home Living Comments: sleeps on couch, denies any other falls  Prior Level of Function:  Prior Function Per Pt/Caregiver Report  Level of [place]: Independent with ADLs and functional transfers, Independent with homemaking with ambulation  Receives Help From: Family  Precautions:  Precautions  Medical Precautions: Fall precautions, Oxygen therapy device and L/min  Precautions Comment: dora, states he is supposed to wear O2 during day but does not.     Objective   Pain:  Pain Assessment  Pain Assessment: 0-10  0-10 (Numeric) Pain Score: 6  Pain Type: Acute pain  Pain Location: Hip  Pain Orientation: Left, Right  Cognition:  Cognition  Overall Cognitive Status: Within Functional Limits    General Assessments:       Sensation  Sensation Comment: no apparant deficits    Postural Control  Postural Control: Within Functional Limits    Static Sitting Balance  Static Sitting-Balance Support: Feet supported, No upper extremity supported  Static Sitting-Level of Assistance: Modified independent  Static Sitting-Comment/Number of Minutes: able to sit EOB mult min w/o LOB.  Dynamic Sitting Balance  Dynamic  Sitting-Comments: able to partially lean fwd but unable to reach feet. No LOB    Static Standing Balance  Static Standing-Balance Support: Bilateral upper extremity supported  Static Standing-Level of Assistance: Contact guard  Static Standing-Comment/Number of Minutes: approx 10 sec prior to amb, pt very shaky and unsteady. Unable to keep FWW completely on floor d/t unsteadiness.  Functional Assessments:  Bed Mobility 1  Bed Mobility 1: Supine to sitting  Level of Assistance 1: Minimum assistance  Bed Mobility Comments 1: HOB raised moderately and use of bed rail. needing increased time to perform and adjust self in bed.  1 trial    Transfer 1  Transfer From 1: Bed to  Transfer to 1: Stand  Technique 1: Sit to stand  Transfer Device 1: Walker, Gait belt  Transfer Level of Assistance 1: Contact guard  Trials/Comments 1: poor hand placement, heavy reliance on FWW creating an overall unsafe transfer. Luís knees soft though no buckling noted.  Transfers 2  Transfer From 2: Stand to  Transfer to 2: Chair with arms  Technique 2: Stand to sit  Transfer Device 2: Walker, Gait belt  Transfer Level of Assistance 2: Moderate assistance  Trials/Comments 2: 1 trial. uncontrolled descent w mod A to help control and improve safety. Poor hand placement.    Ambulation/Gait Training  Ambulation/Gait Training Performed: Yes  Ambulation/Gait Training 1  Surface 1: Level tile  Device 1: Rolling walker  Gait Support Devices: Gait belt  Assistance 1: Contact guard  Quality of Gait 1: Wide base of support, Diminished heel strike, Decreased step length, Shuffling gait, Forward flexed posture, Antalgic, Soft knee(s)  Comments/Distance (ft) 1: 1x5 ft. wide and unsteady gait, difficulty w FWW and keeping all 4 points of contact on ground. Generally unsafe amb pattern overall  Extremity/Trunk Assessments:  RUE   RUE : Within Functional Limits  LUE   LUE: Within Functional Limits  RLE   RLE :  (grossly 4-/5)  LLE   LLE :  (grossly 4/5)  Outcome  Measures:  [facility] Basic Mobility  Turning from your back to your side while in a flat bed without using bedrails: A little  Moving from lying on your back to sitting on the side of a flat bed without using bedrails: A little  Moving to and from bed to chair (including a wheelchair): A little  Standing up from a chair using your arms (e.g. wheelchair or bedside chair): A little  To walk in hospital room: A little  Climbing 3-5 steps with railing: A lot  Basic Mobility - Total Score: 17    Encounter Problems       Encounter Problems (Active)       PT Problem       pt will be able to ambulate home distances with FWW and modified independent        Start:  11/15/24    Expected End:  11/29/24            Pt will be able to perform STS transfer with modified independent        Start:  11/15/24    Expected End:  11/29/24            Pt will be able to perform all aspects of bed mobility with modified independent        Start:  11/15/24    Expected End:  11/29/24            Pt will be able to navigate 5 steps with no hand rail and moderate assist to allow for safe DC.         Start:  11/15/24    Expected End:  11/29/24               Pain - Adult              Education Documentation  Mobility Training, taught by Steph Anthony PT at 11/15/2024  9:39 AM.  Learner: Patient  Readiness: Acceptance  Method: Explanation  Response: Verbalizes Understanding, Demonstrated Understanding, Needs Reinforcement  Comment: Pt edu on role of PT and POC, as well as importance of mobility w proper AD    Education Comments  No comments found.

## 2024-11-16 VITALS
TEMPERATURE: 98.6 F | WEIGHT: 272.49 LBS | HEIGHT: 73 IN | RESPIRATION RATE: 19 BRPM | BODY MASS INDEX: 36.11 KG/M2 | SYSTOLIC BLOOD PRESSURE: 143 MMHG | HEART RATE: 96 BPM | OXYGEN SATURATION: 86 % | DIASTOLIC BLOOD PRESSURE: 66 MMHG

## 2024-11-16 LAB
ANION GAP SERPL CALC-SCNC: 12 MMOL/L (ref 10–20)
BACTERIA UR CULT: ABNORMAL
BUN SERPL-MCNC: 35 MG/DL (ref 6–23)
CALCIUM SERPL-MCNC: 9.2 MG/DL (ref 8.6–10.3)
CHLORIDE SERPL-SCNC: 100 MMOL/L (ref 98–107)
CO2 SERPL-SCNC: 31 MMOL/L (ref 21–32)
CREAT SERPL-MCNC: 0.86 MG/DL (ref 0.5–1.3)
EGFRCR SERPLBLD CKD-EPI 2021: 90 ML/MIN/1.73M*2
ERYTHROCYTE [DISTWIDTH] IN BLOOD BY AUTOMATED COUNT: 12.3 % (ref 11.5–14.5)
GLUCOSE SERPL-MCNC: 152 MG/DL (ref 74–99)
HCT VFR BLD AUTO: 43.9 % (ref 41–52)
HGB BLD-MCNC: 14 G/DL (ref 13.5–17.5)
MCH RBC QN AUTO: 31.6 PG (ref 26–34)
MCHC RBC AUTO-ENTMCNC: 31.9 G/DL (ref 32–36)
MCV RBC AUTO: 99 FL (ref 80–100)
NRBC BLD-RTO: 0 /100 WBCS (ref 0–0)
PLATELET # BLD AUTO: 248 X10*3/UL (ref 150–450)
POTASSIUM SERPL-SCNC: 4.1 MMOL/L (ref 3.5–5.3)
RBC # BLD AUTO: 4.43 X10*6/UL (ref 4.5–5.9)
SODIUM SERPL-SCNC: 139 MMOL/L (ref 136–145)
WBC # BLD AUTO: 10.9 X10*3/UL (ref 4.4–11.3)

## 2024-11-16 PROCEDURE — 97535 SELF CARE MNGMENT TRAINING: CPT | Mod: GO,IPSPLIT

## 2024-11-16 PROCEDURE — 94760 N-INVAS EAR/PLS OXIMETRY 1: CPT | Mod: IPSPLIT

## 2024-11-16 PROCEDURE — 2500000002 HC RX 250 W HCPCS SELF ADMINISTERED DRUGS (ALT 637 FOR MEDICARE OP, ALT 636 FOR OP/ED): Mod: IPSPLIT | Performed by: STUDENT IN AN ORGANIZED HEALTH CARE EDUCATION/TRAINING PROGRAM

## 2024-11-16 PROCEDURE — 94640 AIRWAY INHALATION TREATMENT: CPT | Mod: IPSPLIT

## 2024-11-16 PROCEDURE — 2500000002 HC RX 250 W HCPCS SELF ADMINISTERED DRUGS (ALT 637 FOR MEDICARE OP, ALT 636 FOR OP/ED): Mod: IPSPLIT | Performed by: NURSE PRACTITIONER

## 2024-11-16 PROCEDURE — 2500000004 HC RX 250 GENERAL PHARMACY W/ HCPCS (ALT 636 FOR OP/ED): Mod: IPSPLIT | Performed by: NURSE PRACTITIONER

## 2024-11-16 PROCEDURE — 36415 COLL VENOUS BLD VENIPUNCTURE: CPT | Mod: IPSPLIT

## 2024-11-16 PROCEDURE — 99239 HOSP IP/OBS DSCHRG MGMT >30: CPT | Performed by: NURSE PRACTITIONER

## 2024-11-16 PROCEDURE — 2500000001 HC RX 250 WO HCPCS SELF ADMINISTERED DRUGS (ALT 637 FOR MEDICARE OP): Mod: IPSPLIT | Performed by: STUDENT IN AN ORGANIZED HEALTH CARE EDUCATION/TRAINING PROGRAM

## 2024-11-16 PROCEDURE — 2500000005 HC RX 250 GENERAL PHARMACY W/O HCPCS: Mod: IPSPLIT | Performed by: NURSE PRACTITIONER

## 2024-11-16 PROCEDURE — 85027 COMPLETE CBC AUTOMATED: CPT | Mod: IPSPLIT

## 2024-11-16 PROCEDURE — 2500000001 HC RX 250 WO HCPCS SELF ADMINISTERED DRUGS (ALT 637 FOR MEDICARE OP): Mod: IPSPLIT | Performed by: NURSE PRACTITIONER

## 2024-11-16 PROCEDURE — 80048 BASIC METABOLIC PNL TOTAL CA: CPT | Mod: IPSPLIT

## 2024-11-16 RX ORDER — OXYCODONE HYDROCHLORIDE 5 MG/1
5 TABLET ORAL EVERY 6 HOURS PRN
Qty: 12 TABLET | Refills: 0 | Status: SHIPPED | OUTPATIENT
Start: 2024-11-16 | End: 2024-11-19

## 2024-11-16 RX ORDER — CEFUROXIME AXETIL 500 MG/1
500 TABLET ORAL 2 TIMES DAILY
Qty: 6 TABLET | Refills: 0 | Status: SHIPPED | OUTPATIENT
Start: 2024-11-16 | End: 2024-11-19

## 2024-11-16 RX ORDER — METHYLPREDNISOLONE 4 MG/1
TABLET ORAL
Qty: 21 TABLET | Refills: 0 | Status: SHIPPED | OUTPATIENT
Start: 2024-11-16 | End: 2024-11-23

## 2024-11-16 ASSESSMENT — COGNITIVE AND FUNCTIONAL STATUS - GENERAL
PERSONAL GROOMING: A LITTLE
HELP NEEDED FOR BATHING: A LOT
DRESSING REGULAR UPPER BODY CLOTHING: A LITTLE
DAILY ACTIVITIY SCORE: 16
DRESSING REGULAR LOWER BODY CLOTHING: A LOT
TOILETING: A LOT

## 2024-11-16 ASSESSMENT — ACTIVITIES OF DAILY LIVING (ADL)
BATHING_LEVEL_OF_ASSISTANCE: MODERATE ASSISTANCE;SETUP
HOME_MANAGEMENT_TIME_ENTRY: 52
BATHING_WHERE_ASSESSED: EDGE OF BED

## 2024-11-16 ASSESSMENT — PAIN - FUNCTIONAL ASSESSMENT: PAIN_FUNCTIONAL_ASSESSMENT: 0-10

## 2024-11-16 ASSESSMENT — PAIN DESCRIPTION - LOCATION: LOCATION: BACK

## 2024-11-16 ASSESSMENT — PAIN SCALES - GENERAL
PAINLEVEL_OUTOF10: 4
PAINLEVEL_OUTOF10: 3

## 2024-11-16 NOTE — PROGRESS NOTES
Occupational Therapy    Occupational Therapy Treatment    Name: Christiano Cox  MRN: [id number]  Department:   Room: [facility]-A  Date: 11/16/24  Time Calculation  Start Time: 1028  Stop Time: 1120  Time Calculation (min): 52 min    Assessment:  OT Assessment: Good participation in graded progression of self care peformance with increased time needed to perform due to the pt.'s decrease in O2 saturtaion level with activity.  The pt.'s O2 saturation level decreased to 82%-83% at times throughout self care performance, however the pt. recovered to 88%-89% with cues for use of therapeutic rest & use of pursed lip breathing within 1.5 to 2 minutes.  Pt. was receptive to use of adaptive equipment(reacher, sock aide, long handled shoe horn) for increased ease with LE dressing.  Pt. would benefit from additional instruction & practice with use of adaptive equipment to facilitate increased carry over & independence with LE dressing.  Enrique & cues for sit to stand transfers with a FWW.  Prognosis: Good  Barriers to Discharge: Decreased caregiver support. Pt.'s spouse works.  Evaluation/Treatment Tolerance: Treatment limited secondary to medical complications. Decreased O2 saturation level with activity.  Medical Staff Made Aware: Yes  End of Session Communication: Bedside nurse  End of Session Patient Position: Bed, 2 rail up, Alarm off, caregiver present  Plan:  Treatment Interventions: ADL retraining, Functional transfer training, Endurance training, Patient/family training, Equipment evaluation/education, Neuromuscular reeducation, Compensatory technique education  OT Frequency: 3 times per week  OT Discharge Recommendations: Moderate intensity level of continued care  OT Recommended Transfer Status: Assist of 1  OT - OK to Discharge: Yes Based on completed evaluation and care plan recommendations, no barriers to discharge to next site of care      Subjective   Previous Visit Info:  OT Last Visit  OT Received On:  11/16/24  General:  General  Reason for Referral: impaired self-care d/t admission for COPD exacerbation  Past Medical History Relevant to Rehab: COPD; recent fall 2 wks ago with residual L lower back and leg pain with numbness  Family/Caregiver Present: Yes  Caregiver Feedback: Pt.'s spouse reported that his O2 saturation level is 88% at baseline.  Prior to Session Communication: Bedside nurse  Patient Position Received: Bed, 2 rail up, Alarm off, not on at start of session  Preferred Learning Style: verbal, visual  General Comment: Pt.'s nurse requested this thearpist to proceed with OT session despite the pt.'s O2 saturation level being 88%-89% at rest as the pt. was scheduled to be d/c to home this date & pt. is at this level(88%) at baseline.  Precautions:  Medical Precautions: Fall precautions, Oxygen therapy device and L/min  Precautions Comment: O2, safety precautions    Vital Signs (Past 2hrs)        Date/Time Vitals Session Patient Position Pulse Resp SpO2 BP MAP (mmHg)    11/16/24 1136 --  --  86  --  86 %  --  --     11/16/24 1137 --  --  96  19  86 %  143/66  92                   Vital Signs Comment: During the session, the pt.'s O2 saturation level decreased to 82%-83% at times with activity.  After cues for therapeutic rest & use of pursed lip breathing, the pt.'s O2 saturation level increased to 88%-89%.    Pain Assessment:  Pain Assessment  Pain Assessment: 0-10  0-10 (Numeric) Pain Score: 3  Pain Location: L hip  Pain Interventions: Repositioned  Response to Interventions: No change in pain     Objective   Cognition:  Overall Cognitive Status: Within Functional Limits  Arousal/Alertness: Appropriate responses to stimuli  Activities of Daily Living: Grooming  Grooming Level of Assistance:S set up from a sitting position for energy conservation.  Grooming Where Assessed: Edge of bed    UE Bathing  UE Bathing Level of Assistance: S set up  UE Bathing Where Assessed: Edge of bed  UE Bathing Comments:  Education provided on task modifications, energy conservation/pacing techniques & use of pursed lip breathing during self care performance.    LE Bathing  LE Bathing Level of Assistance: Moderate assistance, Setup  LE Bathing Where Assessed: Edge of bed  LE Bathing Comments: Assistance provided to bathe his distal LE's.  Recommended a long handled sponge.  CGA & cues for balance while performing back perienal hygiene care.  Dot & cues for back perineal hygiene care.    UE Dressing  UE Dressing Level of Assistance:  S set up to don apull over shirt.  Recommended losse fitting clothing for increased ease with donning clothes.  UE Dressing Where Assessed: Edge of bed    LE Dressing  LE Dressing: Yes  LE Dressing Adaptive Equipment: Reacher, Sock aide, Shoe horn  Pants Level of Assistance: Pt. initiated pants over his B LE's with Close SBA& cues using a reacher. CGA & cues for the stand phase of dressing with a FWW & one UE for support.  Sock Level of Assistance: Dot & cues to don B socks with a sock aide(Trialed a flexible sock aide & a standard sock aide) Minimal difficulty with placing his foot into the sock aide likely due to his neuropathy.  Shoe Level of Assistance: Dot & cues to don B tennis shoes(Pre tied) using a long handled shoe horn.  Adult Briefs Level of Assistance: Dot & cues to begin a pull up brief over his distal LE's using a reacher.  CGA & cues for the stand phase of dressing with one UE for support at FWW level.  LE Dressing Where Assessed: Edge of bed    Toileting  Toileting Level of Assistance: Minimum assistance  Where Assessed: Edge of bed for urinal use while standing at FWW level.     Bed Mobility/Transfers: Transfer 1  Technique 1: Sit to stand, Stand to sit  Transfer Device 1: Gait belt, Walker  Transfer Level of Assistance 1: Dot& cues  Trials/Comments 1: Mod cues for safety    Standing Balance:  Static Standing Balance  Static Standing-Level of Assistance: CGA& cues for standing  balance during self care with cues for use of one UE for support.  Pt. also appeared to lean up against the bed for increased stability when standing.    Outcome Measures:  [facility] Daily Activity  Putting on and taking off regular lower body clothing: A lot  Bathing (including washing, rinsing, drying): A lot  Putting on and taking off regular upper body clothing: A little  Toileting, which includes using toilet, bedpan or urinal: A lot  Taking care of personal grooming such as brushing teeth: A little  Eating Meals: None  Daily Activity - Total Score: 16      Education Documentation  Body Mechanics, taught by Irene Manzanares OT at 11/16/2024 12:47 PM.  Learner: Patient  Readiness: Acceptance  Method: Demonstration, Explanation  Response: Demonstrated Understanding, Needs Reinforcement  Comment: Education on safety with transfers, safety with balance, compensation techniques for self care, energy conservation, & use of adaptive equipment for LE self care.    ADL Training, taught by Irene Manzanares OT at 11/16/2024 12:47 PM.  Learner: Patient  Readiness: Acceptance  Method: Demonstration, Explanation  Response: Demonstrated Understanding, Needs Reinforcement  Comment: Education on safety with transfers, safety with balance, compensation techniques for self care, energy conservation, & use of adaptive equipment for LE self care.    Goals:  Encounter Problems       Encounter Problems (Active)       ADLs       Patient will perform UB and LB bathing with stand by assist level of assistance. (Progressing)       Start:  11/14/24    Expected End:  11/28/24            Patient with complete upper body dressing with modified independent level of assistance donning and doffing all UE clothes with PRN adaptive equipment while supported sitting and edge of bed  (Progressing)       Start:  11/14/24    Expected End:  11/28/24            Patient with complete lower body dressing with minimal assist  level of assistance donning and  doffing all LE clothes  with reacher, shoe horn, and sock-aid while supported sitting and edge of bed  (Progressing)       Start:  11/14/24    Expected End:  11/28/24            Patient will complete daily grooming tasks brushing teeth, shaving, and washing face/hair with modified independent level of assistance and PRN adaptive equipment while standing. (Progressing)       Start:  11/14/24    Expected End:  11/28/24            Patient will complete toileting including hygiene clothing management/hygiene with minimal assist  level of assistance. (Progressing)       Start:  11/14/24    Expected End:  11/28/24               MOBILITY       Patient will perform Functional mobility min Household distances/Community Distances with set-up and supervision level of assistance and least restrictive device in order to improve safety and functional mobility.       Start:  11/14/24    Expected End:  11/28/24               TRANSFERS       Patient will perform bed mobility stand by assist level of assistance in order to improve safety and independence with mobility       Start:  11/14/24    Expected End:  11/28/24            Patient will complete functional transfer to bed, chair, commode with front wheeled walker with set-up and supervision level of assistance. (Progressing)       Start:  11/14/24    Expected End:  11/28/24

## 2024-11-16 NOTE — CARE PLAN
The patient's goals for the shift include  To be discharged    The clinical goals for the shift include Patient will have decrease in pain    Over the shift, the patient did make progress toward the following goals.     0745 Assumed care of patient. Patient resting in bed. Denies pain or complaints. States he will leave AMA if not discharged today. Discussed deep breathing exercises. Call light within reach.    0900 PT/OT in to see patient, unable to see due to oxygen. Pulse ox 87-88%, oxygen increased back to 6 liters from 4 liters. Discussed with RT. Secure chat sent to provider as well. Discussed discharge with patient and the increase on oxygen. Patient states he wants to be discharged, wife will bring oxygen tank. Discussed checking pulse ox at home. Call light within reach.    1045 Sitting at side of bed with OT. Pulse ox 85% on 3.5 liters. Wife at bedside. Discussed oxygen use at home. Wife states patient does not wear as ordered, will wear occasionally for only 20 minutes. Discussed the importance of wearing oxygen and maintaining pulse ox.     1140 Discharge instructions given to patient and wife. IV removed and vital signs taken. Aware to  prescriptions at [facility]. Requested pain medications, rx called in for a 3 days supply by provider. Discussed recommendation to stay another night by provider due to pulse ox of 85-89%, patient refuses and states he wants to go home. Discussed the importance of oxygen use and how many liters he is currently using. Discussed complications that could arise from not wearing oxygen as ordered. Patient and wife verbalize understanding.     1145 Patient off floor via wheelchair with staff.

## 2024-11-16 NOTE — CONSULTS
Reason For Consult  COPD Navigator    This RT to see patient for COPD consult. The patient was given a COPD booklet with educational materials regarding pulmonary issues. Patient quit smoking one year ago. [employer] support group discussed. Flyer given for next month’s meeting. Patient diagnosed with COPD two years ago. I educated patient about the disease process and how it affected the lungs making it difficult to breathe. We discussed current medications and if their current medications give them relief. PFT's were last done over a year ago. [facility] is a  [facility] [place] [place]. [facility] Dr. Atkinson number was given if no Pulmonologist is available in [place].  His primary doctor is also at [facility] but he does not remember her name. O2 at home at 2LPM unless on portable concentrator he uses 3LPM. [facility].  Patient given  pulmonary office phone number to make an appt. Patient was very receptive to all information given.      Spacer- The patient was given an aerochamber (spacer) to be administered with a meter dose inhaler at home. I instructed the patient on how to use the spacer with the proper technique to get the best results. In return, the patient stated he understands spacer training appropriately with a good understanding of how it is utilized. I also discussed the names, reasons, and side effects of respiratory medications that are prescribed at home, including does and frequency.    Flutter valve- The patient was given a flutter valve to help facilitate the removal of mucous from the airways this hospital stay. The patient demonstrated flutter training appropriately with a good understanding on how it is utilized.    Pulmonary rehab- Educated patient on pulmonary rehab program. The patient was given information regarding our pulmonary rehabilitation program. Spoke to the patient about the benefits of this program which can  offer coping skills and exercise strengthening sessions providing a better quality of life. Patient did not seem receptive at this time but understands the benefits it would provide.         Kristi Maret Will, RRT

## 2024-11-16 NOTE — DISCHARGE SUMMARY
Discharge Diagnosis    COPD exacerbation (Multi)  Acute on chronic hypoxic respiratory failure   Fall  Left Hip pain  Acute cystitis without hematuria     Issues Requiring Follow-Up      Discharge Meds     Medication List      START taking these medications     cefuroxime 500 mg tablet; Commonly known as: Ceftin; Take 1 tablet (500   mg) by mouth 2 times a day for 3 days.   methylPREDNISolone 4 mg tablets; Commonly known as: Medrol Dospak; Take   as directed on package.     CONTINUE taking these medications     albuterol 90 mcg/actuation inhaler   folic acid 1 mg tablet; Commonly known as: Folvite   ipratropium-albuteroL 0.5-2.5 mg/3 mL nebulizer solution; Commonly known   as: Duo-Neb   rosuvastatin 20 mg tablet; Commonly known as: Crestor   tamsulosin 0.4 mg 24 hr capsule; Commonly known as: Flomax   tiotropium 2.5 mcg/actuation inhaler; Commonly known as: Spiriva   Respimat   Vitamin D3 50 MCG (2000 UT) tablet; Generic drug: cholecalciferol       Test Results Pending At Discharge  Pending Labs       Order Current Status    Urine Culture Preliminary result        74 y.o. male presenting with pain after a fall. Pt states that about 3 weeks ago, he fell 6 feet off a ladder and landed on hit buttock. He has had increasing pain from the hips down since. He did start using a walker that his friend gave him that helped for a few days but the pain has gotten worse. He endorses the pain to be the worst in the left hip. Internal and external rotation is very painful and limited.     Hospital Course    Fall  Left Hip pain  -Fell 6 foot from a ladder 3 weeks ago  -Increasing pain from hips down but left hip primarily  -CT imaging in ED neg for acute fractures  -MRI left hip/pelvis: 1. Severe muscle strain of the left gluteus tomas muscle with areas of intramuscular hematomas. Moderate muscle strain in the right gluteus tomas musculature and in the left gluteus medius musculature. Moderate left trochanteric bursitis which  may be hemorrhagic. Moderate muscle edema and strain in the distal abdominal musculature at their insertion on the pubic symphysis on the left. Small amount of fluid in the pubic symphysis as well with marrow reactive changes. Findings likely posttraumatic given patient's  History Partial-thickness tearing of the left hamstring tendon origin.  Partial-thickness tearing of the left hip abductor tendons. Mild  sprain of the bilateral adductor tendon origin No fracture seen.  -MRI reviewed by Dr. Ortiz with orthopedics at [facility]; does not recommend intervention or transfer  -Will need outpatient follow up   -PT/OT evaluation; recommended moderate level therapy; patient is refusing  -PRN analgesia  -Safety precautions     COPD exacerbation  Acute on chronic hypoxic respiratory failure   -CT PE: 1. No pulmonary embolus. Mild centrilobular emphysema.  No acute intrathoracic process. No significant interval change  from 11/13/2020  -WBC 10.9  -Supplemental oxygen to maintain an sp02 greater than 92%  -Currently on 4L  -Baseline 2L; concern for non-compliance   -continue guaifenesin 600 mg BID   -continue duoneb q6h  -continue Solumedrol q12h  -RT to eval/treat   -completed azithromycin 500 mg daily  -Continue tiotropium 2.5 mcg daily     Acute cystitis without hematuria  BPH with urinary symptoms  - leuks, >50 WBC  -Urine culture grew gram negative bacilli  -continue IV ceftriaxone1 g daily (Day 3)  -Continue tamsulosin 0.8 mg daily     HLD  -Continue rosuvastatin 10 mg daily     DVT ppx  -continue enoxaparin 40 mg daily     PUD ppx  -continue pantoprazole 40 mg daily     Code Status: Full Code      Disposition: Patient was stable to be discharged to home with homehealth. He was discharged on cefuroxime and medrol dose pack. He will follow up with his PCP.    Total cumulative time spent in preparation of this discharge including documentation review, coordination of care with the medical team including PT/SW/care  coordinators and treating consultants, discussion with patient and pertinent family members and finalization of prescriptions, follow-up appointments, and this discharge summary was approximately 45 minutes.     Pertinent Physical Exam At Time of Discharge  Physical Exam  Vitals reviewed.   Constitutional:       Appearance: Normal appearance. He is obese.   HENT:      Head: Normocephalic and atraumatic.      Right Ear: External ear normal.      Left Ear: External ear normal.      Nose: Nose normal.      Mouth/Throat:      Mouth: Mucous membranes are moist.      Pharynx: Oropharynx is clear.   Eyes:      Conjunctiva/sclera: Conjunctivae normal.      Pupils: Pupils are equal, round, and reactive to light.   Cardiovascular:      Rate and Rhythm: Normal rate and regular rhythm.      Pulses: Normal pulses.      Heart sounds: Normal heart sounds.   Pulmonary:      Effort: Pulmonary effort is normal.      Breath sounds: Wheezing present.   Abdominal:      General: Bowel sounds are normal.      Palpations: Abdomen is soft.   Musculoskeletal:         General: Normal range of motion.      Cervical back: Normal range of motion and neck supple.   Skin:     General: Skin is warm and dry.   Neurological:      General: No focal deficit present.      Mental Status: He is alert and oriented to person, place, and time.   Psychiatric:         Mood and Affect: Mood normal.         Behavior: Behavior normal.         Outpatient Follow-Up  No future appointments.      Farhana Mckinnon, APRN-CNP

## 2024-11-16 NOTE — CARE PLAN
The patient's goals for the shift include  pain control    The clinical goals for the shift include Control pain    Problem: Pain - Adult  Goal: Verbalizes/displays adequate comfort level or baseline comfort level  Outcome: Progressing     Problem: Discharge Planning  Goal: Discharge to home or other facility with appropriate resources  Outcome: Progressing     Problem: Chronic Conditions and Co-morbidities  Goal: Patient's chronic conditions and co-morbidity symptoms are monitored and maintained or improved  Outcome: Progressing     Problem: Pain  Goal: Takes deep breaths with improved pain control throughout the shift  Outcome: Progressing  Goal: Turns in bed with improved pain control throughout the shift  Outcome: Progressing  Goal: Walks with improved pain control throughout the shift  Outcome: Progressing  Goal: Performs ADL's with improved pain control throughout shift  Outcome: Progressing  Goal: Participates in PT with improved pain control throughout the shift  Outcome: Progressing  Goal: Free from opioid side effects throughout the shift  Outcome: Progressing  Goal: Free from acute confusion related to pain meds throughout the shift  Outcome: Progressing     Problem: Skin  Goal: Decreased wound size/increased tissue granulation at next dressing change  Outcome: Progressing  Goal: Participates in plan/prevention/treatment measures  Outcome: Progressing  Goal: Prevent/manage excess moisture  Outcome: Progressing  Goal: Prevent/minimize sheer/friction injuries  Outcome: Progressing  Flowsheets (Taken 11/15/2024 3229)  Prevent/minimize sheer/friction injuries:   HOB 30 degrees or less   Increase activity/out of bed for meals  Goal: Promote/optimize nutrition  Outcome: Progressing  Goal: Promote skin healing  Outcome: Progressing     Problem: Fall/Injury  Goal: Verbalize understanding of personal risk factors for fall in the hospital  Outcome: Progressing  Goal: Verbalize understanding of risk factor reduction  measures to prevent injury from fall in the home  Outcome: Progressing  Goal: Use assistive devices by end of the shift  Outcome: Progressing  Goal: Pace activities to prevent fatigue by end of the shift  Outcome: Progressing     Problem: Respiratory  Goal: Clear secretions with interventions this shift  Outcome: Progressing  Goal: Minimize anxiety/maximize coping throughout shift  Outcome: Progressing  Goal: Minimal/no exertional discomfort or dyspnea this shift  Outcome: Progressing  Goal: No signs of respiratory distress (eg. Use of accessory muscles. Peds grunting)  Outcome: Progressing  Goal: Patent airway maintained this shift  Outcome: Progressing  Goal: Tolerate mechanical ventilation evidenced by VS/agitation level this shift  Outcome: Progressing  Goal: Tolerate pulmonary toileting this shift  Outcome: Progressing  Goal: Verbalize decreased shortness of breath this shift  Outcome: Progressing  Goal: Wean oxygen to maintain O2 saturation per order/standard this shift  Outcome: Progressing  Goal: Increase self care and/or family involvement in next 24 hours  Outcome: Progressing

## 2024-11-16 NOTE — PROGRESS NOTES
Physical Therapy                 Therapy Communication Note    Patient Name: Christiano Cox  MRN: [id number]  Department: [facility]  [facility]  Today's Date: 11/16/2024     Discipline: Physical Therapy    Missed Visit Reason: Missed Visit Reason: Patient placed on medical hold (Pt PO2 at 87% on 6 L.  Not appropriate to be seen today.)    Missed Time: Attempt  08:48 am

## 2024-11-27 NOTE — DOCUMENTATION CLARIFICATION NOTE
"    PATIENT:               YVETTE HERRING  ACCT #:                  [id number]  MRN:                       [id number]  :                       1950  ADMIT DATE:       2024 1:15 PM  DISCH DATE:        2024 11:44 AM  RESPONDING PROVIDER #:        [id number]          PROVIDER RESPONSE TEXT:    Muscle strain of left hip    CDI QUERY TEXT:    Clarification        Instruction:    Based on your assessment of the patient and the clinical information, please provide the requested documentation by clicking on the appropriate radio button and enter any additional information if prompted.    Question: Please further clarify the most likely etiology of left hip pain on admission after final work up    When answering this query, please exercise your independent professional judgment. The fact that a question is being asked, does not imply that any particular answer is desired or expected.    The patient's clinical indicators include:  Clinical Information:  Discharge Summary 24 by Farhana AVILES- CNP:    \"74 y.o. male presenting with pain after a fall. Pt states that about 3 weeks ago, he fell 6 feet off a ladder and landed on hit buttock. He has had increasing pain from the hips down since.\"    Clinical Indicators:  Discharge Summary 24 by Farhana MAI CNP:  Fall  Left Hip pain  \"-Fell 6 foot from a ladder 3 weeks ago  -Increasing pain from hips down but left hip primarily\"    CT imaging in ED neg for acute fractures    -MRI left hip/pelvis 24  \"1. Severe muscle strain of the left gluteus tomas muscle with areas  of intramuscular hematomas. Moderate muscle strain in the right  gluteus tomas musculature and in the left gluteus medius  musculature.\"  \"2. Moderate left trochanteric bursitis which may be hemorrhagic.\"    Treatment:  MRI. CT, PRN analgesia    Risk Factors:  Left Hip pain  Options provided:  -- Muscle strain of left hip  -- Trochanteric bursitis of left hip  -- Other - " I will add my own diagnosis  -- Refer to Clinical Documentation Reviewer    Query created by: Cammie Dalal on 11/25/2024 9:47 AM      Electronically signed by:  BI BLAIR 11/27/2024 7:07 AM

## 2024-12-08 ENCOUNTER — HOSPITAL ENCOUNTER (INPATIENT)
Facility: HOSPITAL | Age: 74
LOS: 2 days | Discharge: HOME | DRG: 303 | End: 2024-12-10
Attending: INTERNAL MEDICINE | Admitting: INTERNAL MEDICINE
Payer: OTHER GOVERNMENT

## 2024-12-08 ENCOUNTER — APPOINTMENT (OUTPATIENT)
Dept: RADIOLOGY | Facility: HOSPITAL | Age: 74
End: 2024-12-08
Payer: OTHER GOVERNMENT

## 2024-12-08 ENCOUNTER — HOSPITAL ENCOUNTER (EMERGENCY)
Facility: HOSPITAL | Age: 74
Discharge: OTHER INSTITUTION | End: 2024-12-08
Payer: OTHER GOVERNMENT

## 2024-12-08 ENCOUNTER — APPOINTMENT (OUTPATIENT)
Dept: CARDIOLOGY | Facility: HOSPITAL | Age: 74
End: 2024-12-08
Payer: OTHER GOVERNMENT

## 2024-12-08 VITALS
BODY MASS INDEX: 37.93 KG/M2 | TEMPERATURE: 97 F | DIASTOLIC BLOOD PRESSURE: 79 MMHG | SYSTOLIC BLOOD PRESSURE: 130 MMHG | OXYGEN SATURATION: 92 % | RESPIRATION RATE: 16 BRPM | HEIGHT: 72 IN | WEIGHT: 280 LBS | HEART RATE: 88 BPM

## 2024-12-08 DIAGNOSIS — R09.89 OTHER SPECIFIED SYMPTOMS AND SIGNS INVOLVING THE CIRCULATORY AND RESPIRATORY SYSTEMS: ICD-10-CM

## 2024-12-08 DIAGNOSIS — L03.119 CELLULITIS OF LOWER EXTREMITY, UNSPECIFIED LATERALITY: Primary | ICD-10-CM

## 2024-12-08 DIAGNOSIS — L03.90 CELLULITIS: ICD-10-CM

## 2024-12-08 DIAGNOSIS — N39.0 UTI (URINARY TRACT INFECTION) WITH PYURIA: ICD-10-CM

## 2024-12-08 DIAGNOSIS — R33.9 URINARY RETENTION: ICD-10-CM

## 2024-12-08 DIAGNOSIS — R60.0 BILATERAL LOWER EXTREMITY EDEMA: Primary | ICD-10-CM

## 2024-12-08 LAB
ALBUMIN SERPL BCP-MCNC: 3.6 G/DL (ref 3.4–5)
ALP SERPL-CCNC: 105 U/L (ref 33–136)
ALT SERPL W P-5'-P-CCNC: 27 U/L (ref 10–52)
ANION GAP SERPL CALC-SCNC: 13 MMOL/L (ref 10–20)
APPEARANCE UR: ABNORMAL
APTT PPP: 35 SECONDS (ref 27–38)
AST SERPL W P-5'-P-CCNC: 30 U/L (ref 9–39)
BACTERIA #/AREA URNS AUTO: ABNORMAL /HPF
BASOPHILS # BLD AUTO: 0.01 X10*3/UL (ref 0–0.1)
BASOPHILS NFR BLD AUTO: 0.2 %
BILIRUB SERPL-MCNC: 0.5 MG/DL (ref 0–1.2)
BILIRUB UR STRIP.AUTO-MCNC: NEGATIVE MG/DL
BNP SERPL-MCNC: 59 PG/ML (ref 0–99)
BUN SERPL-MCNC: 13 MG/DL (ref 6–23)
CALCIUM SERPL-MCNC: 8.9 MG/DL (ref 8.6–10.3)
CARDIAC TROPONIN I PNL SERPL HS: 10 NG/L (ref 0–20)
CARDIAC TROPONIN I PNL SERPL HS: 8 NG/L (ref 0–20)
CHLORIDE SERPL-SCNC: 100 MMOL/L (ref 98–107)
CO2 SERPL-SCNC: 27 MMOL/L (ref 21–32)
COLOR UR: YELLOW
CREAT SERPL-MCNC: 1.39 MG/DL (ref 0.5–1.3)
D DIMER PPP FEU-MCNC: 1498 NG/ML FEU
EGFRCR SERPLBLD CKD-EPI 2021: 53 ML/MIN/1.73M*2
EOSINOPHIL # BLD AUTO: 0.25 X10*3/UL (ref 0–0.4)
EOSINOPHIL NFR BLD AUTO: 3.8 %
ERYTHROCYTE [DISTWIDTH] IN BLOOD BY AUTOMATED COUNT: 12.2 % (ref 11.5–14.5)
FLUAV RNA RESP QL NAA+PROBE: NOT DETECTED
FLUBV RNA RESP QL NAA+PROBE: NOT DETECTED
GLUCOSE SERPL-MCNC: 100 MG/DL (ref 74–99)
GLUCOSE UR STRIP.AUTO-MCNC: NORMAL MG/DL
HCT VFR BLD AUTO: 41.2 % (ref 41–52)
HGB BLD-MCNC: 13.2 G/DL (ref 13.5–17.5)
IMM GRANULOCYTES # BLD AUTO: 0.03 X10*3/UL (ref 0–0.5)
IMM GRANULOCYTES NFR BLD AUTO: 0.5 % (ref 0–0.9)
INR PPP: 1.2 (ref 0.9–1.1)
KETONES UR STRIP.AUTO-MCNC: NEGATIVE MG/DL
LACTATE SERPL-SCNC: 1.6 MMOL/L (ref 0.4–2)
LEUKOCYTE ESTERASE UR QL STRIP.AUTO: ABNORMAL
LYMPHOCYTES # BLD AUTO: 1.48 X10*3/UL (ref 0.8–3)
LYMPHOCYTES NFR BLD AUTO: 22.4 %
MAGNESIUM SERPL-MCNC: 1.78 MG/DL (ref 1.6–2.4)
MCH RBC QN AUTO: 31.7 PG (ref 26–34)
MCHC RBC AUTO-ENTMCNC: 32 G/DL (ref 32–36)
MCV RBC AUTO: 99 FL (ref 80–100)
MONOCYTES # BLD AUTO: 0.64 X10*3/UL (ref 0.05–0.8)
MONOCYTES NFR BLD AUTO: 9.7 %
MUCOUS THREADS #/AREA URNS AUTO: ABNORMAL /LPF
NEUTROPHILS # BLD AUTO: 4.21 X10*3/UL (ref 1.6–5.5)
NEUTROPHILS NFR BLD AUTO: 63.4 %
NITRITE UR QL STRIP.AUTO: ABNORMAL
NRBC BLD-RTO: 0 /100 WBCS (ref 0–0)
PH UR STRIP.AUTO: 7 [PH]
PLATELET # BLD AUTO: 242 X10*3/UL (ref 150–450)
POTASSIUM SERPL-SCNC: 3.9 MMOL/L (ref 3.5–5.3)
PROT SERPL-MCNC: 7.2 G/DL (ref 6.4–8.2)
PROT UR STRIP.AUTO-MCNC: ABNORMAL MG/DL
PROTHROMBIN TIME: 13.8 SECONDS (ref 9.8–12.8)
RBC # BLD AUTO: 4.16 X10*6/UL (ref 4.5–5.9)
RBC # UR STRIP.AUTO: NEGATIVE /UL
RBC #/AREA URNS AUTO: ABNORMAL /HPF
SARS-COV-2 RNA RESP QL NAA+PROBE: NOT DETECTED
SODIUM SERPL-SCNC: 136 MMOL/L (ref 136–145)
SP GR UR STRIP.AUTO: 1.02
SQUAMOUS #/AREA URNS AUTO: ABNORMAL /HPF
UROBILINOGEN UR STRIP.AUTO-MCNC: ABNORMAL MG/DL
WBC # BLD AUTO: 6.6 X10*3/UL (ref 4.4–11.3)
WBC #/AREA URNS AUTO: >50 /HPF

## 2024-12-08 PROCEDURE — 96367 TX/PROPH/DG ADDL SEQ IV INF: CPT

## 2024-12-08 PROCEDURE — 83605 ASSAY OF LACTIC ACID: CPT | Performed by: PHYSICIAN ASSISTANT

## 2024-12-08 PROCEDURE — 83735 ASSAY OF MAGNESIUM: CPT | Performed by: PHYSICIAN ASSISTANT

## 2024-12-08 PROCEDURE — 99285 EMERGENCY DEPT VISIT HI MDM: CPT | Mod: 25

## 2024-12-08 PROCEDURE — 36415 COLL VENOUS BLD VENIPUNCTURE: CPT | Performed by: PHYSICIAN ASSISTANT

## 2024-12-08 PROCEDURE — 1100000001 HC PRIVATE ROOM DAILY

## 2024-12-08 PROCEDURE — 93970 EXTREMITY STUDY: CPT | Performed by: RADIOLOGY

## 2024-12-08 PROCEDURE — 81001 URINALYSIS AUTO W/SCOPE: CPT | Performed by: EMERGENCY MEDICINE

## 2024-12-08 PROCEDURE — 83880 ASSAY OF NATRIURETIC PEPTIDE: CPT | Performed by: PHYSICIAN ASSISTANT

## 2024-12-08 PROCEDURE — 85730 THROMBOPLASTIN TIME PARTIAL: CPT | Performed by: PHYSICIAN ASSISTANT

## 2024-12-08 PROCEDURE — 85025 COMPLETE CBC W/AUTO DIFF WBC: CPT | Performed by: PHYSICIAN ASSISTANT

## 2024-12-08 PROCEDURE — 93970 EXTREMITY STUDY: CPT

## 2024-12-08 PROCEDURE — 2500000004 HC RX 250 GENERAL PHARMACY W/ HCPCS (ALT 636 FOR OP/ED)

## 2024-12-08 PROCEDURE — 96375 TX/PRO/DX INJ NEW DRUG ADDON: CPT

## 2024-12-08 PROCEDURE — 2550000001 HC RX 255 CONTRASTS: Performed by: PHYSICIAN ASSISTANT

## 2024-12-08 PROCEDURE — 85379 FIBRIN DEGRADATION QUANT: CPT | Performed by: PHYSICIAN ASSISTANT

## 2024-12-08 PROCEDURE — 93005 ELECTROCARDIOGRAM TRACING: CPT

## 2024-12-08 PROCEDURE — 74177 CT ABD & PELVIS W/CONTRAST: CPT

## 2024-12-08 PROCEDURE — 87075 CULTR BACTERIA EXCEPT BLOOD: CPT | Mod: 59,GENLAB | Performed by: PHYSICIAN ASSISTANT

## 2024-12-08 PROCEDURE — 85610 PROTHROMBIN TIME: CPT | Performed by: PHYSICIAN ASSISTANT

## 2024-12-08 PROCEDURE — 71260 CT THORAX DX C+: CPT | Performed by: STUDENT IN AN ORGANIZED HEALTH CARE EDUCATION/TRAINING PROGRAM

## 2024-12-08 PROCEDURE — 96366 THER/PROPH/DIAG IV INF ADDON: CPT

## 2024-12-08 PROCEDURE — 2500000004 HC RX 250 GENERAL PHARMACY W/ HCPCS (ALT 636 FOR OP/ED): Performed by: PHYSICIAN ASSISTANT

## 2024-12-08 PROCEDURE — 87636 SARSCOV2 & INF A&B AMP PRB: CPT | Performed by: PHYSICIAN ASSISTANT

## 2024-12-08 PROCEDURE — 96365 THER/PROPH/DIAG IV INF INIT: CPT

## 2024-12-08 PROCEDURE — 84484 ASSAY OF TROPONIN QUANT: CPT | Performed by: PHYSICIAN ASSISTANT

## 2024-12-08 PROCEDURE — 80048 BASIC METABOLIC PNL TOTAL CA: CPT | Performed by: PHYSICIAN ASSISTANT

## 2024-12-08 PROCEDURE — 74177 CT ABD & PELVIS W/CONTRAST: CPT | Performed by: STUDENT IN AN ORGANIZED HEALTH CARE EDUCATION/TRAINING PROGRAM

## 2024-12-08 RX ORDER — VANCOMYCIN 2 G/400ML
2 INJECTION, SOLUTION INTRAVENOUS ONCE
Status: COMPLETED | OUTPATIENT
Start: 2024-12-08 | End: 2024-12-08

## 2024-12-08 RX ORDER — ONDANSETRON HYDROCHLORIDE 2 MG/ML
INJECTION, SOLUTION INTRAVENOUS
Status: COMPLETED
Start: 2024-12-08 | End: 2024-12-08

## 2024-12-08 RX ORDER — VANCOMYCIN HYDROCHLORIDE 1 G/200ML
INJECTION, SOLUTION INTRAVENOUS
Status: COMPLETED
Start: 2024-12-08 | End: 2024-12-08

## 2024-12-08 RX ORDER — ONDANSETRON HYDROCHLORIDE 2 MG/ML
4 INJECTION, SOLUTION INTRAVENOUS ONCE
Status: COMPLETED | OUTPATIENT
Start: 2024-12-08 | End: 2024-12-08

## 2024-12-08 RX ADMIN — VANCOMYCIN 2 G: 2 INJECTION, SOLUTION INTRAVENOUS at 20:18

## 2024-12-08 RX ADMIN — ONDANSETRON 4 MG: 2 INJECTION, SOLUTION INTRAMUSCULAR; INTRAVENOUS at 19:22

## 2024-12-08 RX ADMIN — VANCOMYCIN HYDROCHLORIDE: 1 INJECTION, SOLUTION INTRAVENOUS at 20:14

## 2024-12-08 RX ADMIN — IOHEXOL 75 ML: 350 INJECTION, SOLUTION INTRAVENOUS at 20:21

## 2024-12-08 RX ADMIN — SODIUM CHLORIDE 500 ML: 9 INJECTION, SOLUTION INTRAVENOUS at 20:18

## 2024-12-08 RX ADMIN — ONDANSETRON HYDROCHLORIDE 4 MG: 2 INJECTION, SOLUTION INTRAVENOUS at 19:22

## 2024-12-08 RX ADMIN — PIPERACILLIN SODIUM AND TAZOBACTAM SODIUM 3.38 G: 3; .375 INJECTION, SOLUTION INTRAVENOUS at 19:08

## 2024-12-08 SDOH — SOCIAL STABILITY: SOCIAL INSECURITY: WERE YOU ABLE TO COMPLETE ALL THE BEHAVIORAL HEALTH SCREENINGS?: YES

## 2024-12-08 SDOH — HEALTH STABILITY: MENTAL HEALTH: HOW OFTEN DO YOU HAVE A DRINK CONTAINING ALCOHOL?: NEVER

## 2024-12-08 SDOH — ECONOMIC STABILITY: FOOD INSECURITY: WITHIN THE PAST 12 MONTHS, YOU WORRIED THAT YOUR FOOD WOULD RUN OUT BEFORE YOU GOT THE MONEY TO BUY MORE.: NEVER TRUE

## 2024-12-08 SDOH — SOCIAL STABILITY: SOCIAL INSECURITY: ARE YOU OR HAVE YOU BEEN THREATENED OR ABUSED PHYSICALLY, EMOTIONALLY, OR SEXUALLY BY ANYONE?: NO

## 2024-12-08 SDOH — SOCIAL STABILITY: SOCIAL INSECURITY: DO YOU FEEL ANYONE HAS EXPLOITED OR TAKEN ADVANTAGE OF YOU FINANCIALLY OR OF YOUR PERSONAL PROPERTY?: NO

## 2024-12-08 SDOH — SOCIAL STABILITY: SOCIAL INSECURITY: HAVE YOU HAD ANY THOUGHTS OF HARMING ANYONE ELSE?: NO

## 2024-12-08 SDOH — HEALTH STABILITY: MENTAL HEALTH: HOW OFTEN DO YOU HAVE SIX OR MORE DRINKS ON ONE OCCASION?: NEVER

## 2024-12-08 SDOH — HEALTH STABILITY: MENTAL HEALTH
DO YOU FEEL STRESS - TENSE, RESTLESS, NERVOUS, OR ANXIOUS, OR UNABLE TO SLEEP AT NIGHT BECAUSE YOUR MIND IS TROUBLED ALL THE TIME - THESE DAYS?: NOT AT ALL

## 2024-12-08 SDOH — ECONOMIC STABILITY: FOOD INSECURITY: WITHIN THE PAST 12 MONTHS, THE FOOD YOU BOUGHT JUST DIDN'T LAST AND YOU DIDN'T HAVE MONEY TO GET MORE.: NEVER TRUE

## 2024-12-08 SDOH — SOCIAL STABILITY: SOCIAL INSECURITY: WITHIN THE LAST YEAR, HAVE YOU BEEN AFRAID OF YOUR PARTNER OR EX-PARTNER?: NO

## 2024-12-08 SDOH — SOCIAL STABILITY: SOCIAL INSECURITY: DO YOU FEEL UNSAFE GOING BACK TO THE PLACE WHERE YOU ARE LIVING?: NO

## 2024-12-08 SDOH — HEALTH STABILITY: MENTAL HEALTH: HOW MANY DRINKS CONTAINING ALCOHOL DO YOU HAVE ON A TYPICAL DAY WHEN YOU ARE DRINKING?: PATIENT DOES NOT DRINK

## 2024-12-08 SDOH — ECONOMIC STABILITY: INCOME INSECURITY: IN THE PAST 12 MONTHS HAS THE ELECTRIC, GAS, OIL, OR WATER COMPANY THREATENED TO SHUT OFF SERVICES IN YOUR HOME?: NO

## 2024-12-08 SDOH — SOCIAL STABILITY: SOCIAL INSECURITY: ABUSE: ADULT

## 2024-12-08 SDOH — SOCIAL STABILITY: SOCIAL INSECURITY: WITHIN THE LAST YEAR, HAVE YOU BEEN HUMILIATED OR EMOTIONALLY ABUSED IN OTHER WAYS BY YOUR PARTNER OR EX-PARTNER?: NO

## 2024-12-08 SDOH — SOCIAL STABILITY: SOCIAL INSECURITY: DOES ANYONE TRY TO KEEP YOU FROM HAVING/CONTACTING OTHER FRIENDS OR DOING THINGS OUTSIDE YOUR HOME?: NO

## 2024-12-08 SDOH — SOCIAL STABILITY: SOCIAL INSECURITY: ARE THERE ANY APPARENT SIGNS OF INJURIES/BEHAVIORS THAT COULD BE RELATED TO ABUSE/NEGLECT?: NO

## 2024-12-08 SDOH — SOCIAL STABILITY: SOCIAL INSECURITY: HAVE YOU HAD THOUGHTS OF HARMING ANYONE ELSE?: NO

## 2024-12-08 SDOH — SOCIAL STABILITY: SOCIAL INSECURITY: HAS ANYONE EVER THREATENED TO HURT YOUR FAMILY OR YOUR PETS?: NO

## 2024-12-08 ASSESSMENT — LIFESTYLE VARIABLES
SKIP TO QUESTIONS 9-10: 1
PRESCIPTION_ABUSE_PAST_12_MONTHS: NO
AUDIT-C TOTAL SCORE: 0
HOW OFTEN DO YOU HAVE A DRINK CONTAINING ALCOHOL: NEVER
SKIP TO QUESTIONS 9-10: 1
AUDIT-C TOTAL SCORE: 0
SUBSTANCE_ABUSE_PAST_12_MONTHS: NO
HOW OFTEN DO YOU HAVE 6 OR MORE DRINKS ON ONE OCCASION: NEVER
HOW MANY STANDARD DRINKS CONTAINING ALCOHOL DO YOU HAVE ON A TYPICAL DAY: PATIENT DOES NOT DRINK
AUDIT-C TOTAL SCORE: 0

## 2024-12-08 ASSESSMENT — ACTIVITIES OF DAILY LIVING (ADL)
HEARING - LEFT EAR: HEARING AID
JUDGMENT_ADEQUATE_SAFELY_COMPLETE_DAILY_ACTIVITIES: YES
ASSISTIVE_DEVICE: WALKER
BATHING: INDEPENDENT
WALKS IN HOME: INDEPENDENT
ADEQUATE_TO_COMPLETE_ADL: YES
PATIENT'S MEMORY ADEQUATE TO SAFELY COMPLETE DAILY ACTIVITIES?: YES
DRESSING YOURSELF: INDEPENDENT
TOILETING: INDEPENDENT
HEARING - RIGHT EAR: HEARING AID
LACK_OF_TRANSPORTATION: NO
GROOMING: INDEPENDENT
FEEDING YOURSELF: INDEPENDENT

## 2024-12-08 ASSESSMENT — PAIN SCALES - GENERAL
PAINLEVEL_OUTOF10: 4
PAINLEVEL_OUTOF10: 5 - MODERATE PAIN

## 2024-12-08 ASSESSMENT — COLUMBIA-SUICIDE SEVERITY RATING SCALE - C-SSRS
1. IN THE PAST MONTH, HAVE YOU WISHED YOU WERE DEAD OR WISHED YOU COULD GO TO SLEEP AND NOT WAKE UP?: NO
2. HAVE YOU ACTUALLY HAD ANY THOUGHTS OF KILLING YOURSELF?: NO
2. HAVE YOU ACTUALLY HAD ANY THOUGHTS OF KILLING YOURSELF?: NO
6. HAVE YOU EVER DONE ANYTHING, STARTED TO DO ANYTHING, OR PREPARED TO DO ANYTHING TO END YOUR LIFE?: NO
1. IN THE PAST MONTH, HAVE YOU WISHED YOU WERE DEAD OR WISHED YOU COULD GO TO SLEEP AND NOT WAKE UP?: NO
6. HAVE YOU EVER DONE ANYTHING, STARTED TO DO ANYTHING, OR PREPARED TO DO ANYTHING TO END YOUR LIFE?: NO

## 2024-12-08 ASSESSMENT — COGNITIVE AND FUNCTIONAL STATUS - GENERAL
EATING MEALS: A LITTLE
DAILY ACTIVITIY SCORE: 22
MOBILITY SCORE: 24
PATIENT BASELINE BEDBOUND: NO
PERSONAL GROOMING: A LITTLE

## 2024-12-08 ASSESSMENT — PATIENT HEALTH QUESTIONNAIRE - PHQ9
SUM OF ALL RESPONSES TO PHQ9 QUESTIONS 1 & 2: 0
2. FEELING DOWN, DEPRESSED OR HOPELESS: NOT AT ALL
1. LITTLE INTEREST OR PLEASURE IN DOING THINGS: NOT AT ALL

## 2024-12-08 ASSESSMENT — PAIN - FUNCTIONAL ASSESSMENT
PAIN_FUNCTIONAL_ASSESSMENT: 0-10
PAIN_FUNCTIONAL_ASSESSMENT: 0-10

## 2024-12-08 NOTE — ED PROVIDER NOTES
HPI   Chief Complaint   Patient presents with    Leg Pain     Bilateral lower extremity edema, redness possible cellulitis       74[facility]year-old [facility] with [facility] of [facility] recent admission 3 weeks ago with muscle tears and groin strain here today with decreased urination and lower extremity edema with erythema from mid tibial to the feet    After the fall with a groin strain and muscle tears he is had edema to his pelvic area testicles with progressive swelling to his lower extremities, now with erythema mid tibial to feet bilateral and serous seeping, denies any fevers  Does have chronic shortness of breath from his COPD is currently on 2 L oxygen continuously in the past patient and his significant other states he does wear oxygen at but not on a continuous basis      History provided by:  Relative and patient  History limited by:  Age   used: No            Patient History   Past Medical History:   Diagnosis Date    COPD (chronic obstructive pulmonary disease) (Multi)     DVT (deep venous thrombosis) (Multi)      Past Surgical History:   Procedure Laterality Date    CATARACT EXTRACTION, BILATERAL Bilateral      No family history on file.  Social History     Tobacco Use    Smoking status: Former     Current packs/day: 0.00     Average packs/day: 2.0 packs/day for 63.1 years (126.3 ttl pk-yrs)     Types: Cigarettes     Start date: 1959     Quit date:      Years since quittin.9    Smokeless tobacco: Never   Vaping Use    Vaping status: Never Used   Substance Use Topics    Alcohol use: Not Currently    Drug use: Never       Physical Exam   ED Triage Vitals [24 1744]   Temperature Heart Rate Respirations BP   37.4 °C (99.3 °F) 92 20 154/75      Pulse Ox Temp Source Heart Rate Source Patient Position   (!) 93 % Oral -- Sitting      BP Location FiO2 (%)     Right arm --       Physical Exam  Vitals and nursing note reviewed.   Constitutional:       General:  He is not in acute distress.     Appearance: He is well-developed. He is obese.   HENT:      Head: Normocephalic and atraumatic.      Right Ear: Tympanic membrane, ear canal and external ear normal.      Left Ear: Tympanic membrane, ear canal and external ear normal.      Nose: Nose normal.      Mouth/Throat:      Mouth: Mucous membranes are moist.   Eyes:      Conjunctiva/sclera: Conjunctivae normal.      Pupils: Pupils are equal, round, and reactive to light.   Cardiovascular:      Rate and Rhythm: Normal rate and regular rhythm.      Heart sounds: No murmur heard.  Pulmonary:      Effort: Pulmonary effort is normal. No respiratory distress.      Breath sounds: Normal breath sounds.   Abdominal:      Palpations: Abdomen is soft.      Tenderness: There is no abdominal tenderness.      Comments: Patient edematous to the lower pelvis into the genital area  Slight erythema at the pannus skin fold of the lower abdomen  But no severe erythema no scale or discharge   Genitourinary:     Comments: Testicles and penis edematous  No erythema or open areas  Musculoskeletal:         General: Swelling present.      Cervical back: Neck supple.      Comments: Patient edematous from the lower groin area to the feet more pronounced to bilateral lower extremities with erythema mid tibial to toes  Skin taut with serous slight drainage   Skin:     General: Skin is warm and dry.      Capillary Refill: Capillary refill takes less than 2 seconds.      Comments: Bilateral lower extremities mid tibial down erythema edema  Pulses palpable bilateral lower extremities   Neurological:      General: No focal deficit present.      Mental Status: He is alert.   Psychiatric:         Mood and Affect: Mood normal.           ED Course & MDM   Diagnoses as of 12/08/24 2055   Cellulitis of lower extremity, unspecified laterality   Urinary retention   UTI (urinary tract infection) with pyuria                 No data recorded     Jose Coma Scale  Score: 15 (12/08/24 1818 : Kristi Brush, BRYAN)                           Medical Decision Making  Differential:   1) cellulitis   2) UTI   3) scrotal penile edema  4) lower extremity edema  Only 4-year-old male here with complaints of increasing edema to the lower extremities also noted to have swelling/edema to his scrotum and penis states symptoms started about 3 weeks ago after he had a traumatic fall with hematoma and muscle tearing since he has had increasing difficulty with urination per the patient and his wife and progressive edema to his genital and lower extremities here on exam he does have slight edema to his scrotum and penis along with edema to the lower extremities no erythema to the genital area D-dimer was elevated at 1498 ultrasound negative for acute DVT troponin normal at 8, 10 EKG no acute changes compared to previous CT chest abdomen pelvis shows no acute abnormality in the chest abdomen or pelvis there is partial thickening of the left gluteus tomas muscle related to his muscular hematoma from the recent MRI of the pelvis CBC no leukocytosis patient has been accepted at [facility] waiting transfer    Labs Reviewed  URINALYSIS WITH REFLEX MICROSCOPIC - Abnormal     Color, Urine                  Yellow                 Appearance, Urine             Turbid (*)               Specific Gravity, Urine       1.022                  pH, Urine                     7.0                    Protein, Urine                30 (1+) (*)               Glucose, Urine                Normal                 Blood, Urine                  NEGATIVE                Ketones, Urine                NEGATIVE                Bilirubin, Urine              NEGATIVE                Urobilinogen, Urine           2 (1+) (*)               Nitrite, Urine                2+ (*)                 Leukocyte Esterase, Urine     500 Dasha/µL (*)            MICROSCOPIC ONLY, URINE - Abnormal     WBC, Urine                    >50 (*)                 RBC, Urine                    3-5                    Squamous Epithelial Cells, Urine                          Bacteria, Urine               3+ (*)                 Mucus, Urine                  2+                  CBC WITH AUTO DIFFERENTIAL - Abnormal     WBC                           6.6                    nRBC                          0.0                    RBC                           4.16 (*)               Hemoglobin                    13.2 (*)               Hematocrit                    41.2                   MCV                           99                     MCH                           31.7                   MCHC                          32.0                   RDW                           12.2                   Platelets                     242                    Neutrophils %                 63.4                   Immature Granulocytes %, Automated   0.5                    Lymphocytes %                 22.4                   Monocytes %                   9.7                    Eosinophils %                 3.8                    Basophils %                   0.2                    Neutrophils Absolute          4.21                   Immature Granulocytes Absolute, Au*   0.03                   Lymphocytes Absolute          1.48                   Monocytes Absolute            0.64                   Eosinophils Absolute          0.25                   Basophils Absolute            0.01                COMPREHENSIVE METABOLIC PANEL - Abnormal     Glucose                       100 (*)                Sodium                        136                    Potassium                     3.9                    Chloride                      100                    Bicarbonate                   27                     Anion Gap                     13                     Urea Nitrogen                 13                     Creatinine                    1.39 (*)               eGFR                          53 (*)                  Calcium                       8.9                    Albumin                       3.6                    Alkaline Phosphatase          105                    Total Protein                 7.2                    AST                           30                     Bilirubin, Total              0.5                    ALT                           27                  PROTIME-INR - Abnormal     Protime                       13.8 (*)               INR                           1.2 (*)             D-DIMER, NON VTE - Abnormal     D-Dimer Non VTE, Quant (ng/mL FEU)   1,498 (*)                   Narrative: The D-Dimer assay is reported in ng/mL Fibrinogen Equivalent Units (FEU). The results of this assay should NOT be used for the exclusion of Deep Vein Thrombosis and/or Pulmonary Embolism.  MAGNESIUM - Normal     Magnesium                     1.78                LACTATE - Normal     Lactate                       1.6                        Narrative: Venipuncture immediately after or during the administration of Metamizole may lead to falsely low results. Testing should be performed immediately prior to Metamizole dosing.  APTT - Normal     aPTT                          35                         Narrative: The APTT is no longer used for monitoring Unfractionated Heparin Therapy. For monitoring Heparin Therapy, use the Heparin Assay.  B-TYPE NATRIURETIC PEPTIDE - Normal     BNP                           59                         Narrative:    <100 pg/mL - Heart failure unlikely                  100-299 pg/mL - Intermediate probability of acute heart                                  failure exacerbation. Correlate with clinical                                  context and patient history.                    >=300 pg/mL - Heart Failure likely. Correlate with clinical                                  context and patient history.                                    BNP testing is performed using different testing  methodology at [facility] than at [facility]. Direct result comparisons should only be made within the same method.                     SERIAL TROPONIN-INITIAL - Normal     Troponin I, High Sensitivity   10                         Narrative: Less than 99th percentile of normal range cutoff-                  Female and children under 18 years old <14 ng/L; Male <21 ng/L: Negative                  Repeat testing should be performed if clinically indicated.                                     Female and children under 18 years old 14-50 ng/L; Male 21-50 ng/L:                  Consistent with possible cardiac damage and possible increased clinical                   risk. Serial measurements may help to assess extent of myocardial damage.                                     >50 ng/L: Consistent with cardiac damage, increased clinical risk and                  myocardial infarction. Serial measurements may help assess extent of                   myocardial damage.                                      NOTE: Children less than 1 year old may have higher baseline troponin                   levels and results should be interpreted in conjunction with the overall                   clinical context.                                     NOTE: Troponin I testing is performed using a different                   testing methodology at [facility] than at [facility]. Direct result comparisons should only                   be made within the same method.  SERIAL TROPONIN, 1 HOUR - Normal     Troponin I, High Sensitivity   8                          Narrative: Less than 99th percentile of normal range cutoff-                  Female and children under 18 years old <14 ng/L; Male <21 ng/L: Negative                  Repeat testing should be performed if clinically indicated.                                     Female and children under 18 years old 14-50 ng/L; Male  21-50 ng/L:                  Consistent with possible cardiac damage and possible increased clinical                   risk. Serial measurements may help to assess extent of myocardial damage.                                     >50 ng/L: Consistent with cardiac damage, increased clinical risk and                  myocardial infarction. Serial measurements may help assess extent of                   myocardial damage.                                      NOTE: Children less than 1 year old may have higher baseline troponin                   levels and results should be interpreted in conjunction with the overall                   clinical context.                                     NOTE: Troponin I testing is performed using a different                   testing methodology at [facility] than at other                   [facility]. Direct result comparisons should only                   be made within the same method.  BLOOD CULTURE  BLOOD CULTURE  TROPONIN SERIES- (INITIAL, 1 HR)         Narrative: The following orders were created for panel order Troponin I Series, High Sensitivity (0, 1 HR).                  Procedure                               Abnormality         Status                                     ---------                               -----------         ------                                     Troponin I, High Sensiti...[[id number]]  Normal              Final result                               Troponin, High Sensitivi...[[id number]]  Normal              Final result                                                 Please view results for these tests on the individual orders.  SARS-COV-2 AND INFLUENZA A/B PCR  CT chest abdomen pelvis w IV contrast   Final Result    CHEST    1.  No acute abnormality in the chest. Aortic valve and coronary    artery atherosclerotic calcifications are present.          ABDOMEN - PELVIS    1.  No acute abnormality in the abdomen/pelvis. Partially  visualized    thickening of the left gluteus tomas muscle  related to muscular    hematoma described in greater detail on recent MRI pelvis.    2. Significant prostatomegaly. Right-sided renal cysts similar to    prior incompletely characterized, likely representing proteinaceous    cysts. Hepatomegaly and diffuse hepatic steatosis.                Signed by: Rory Gudino 12/8/2024 8:54 PM    Dictation workstation:   [id number]     Vascular US Lower Extremity Venous Duplex Bilateral   Final Result    Negative study.  No deep venous thrombosis of the  bilateral lower    extremity.          MACRO:    None          Signed by: Christiano Larry 12/8/2024 8:25 PM    Dictation workstation:   [id number]        Plan: Discussed differential with the patient and /or parents family   Patient to  follow up with the PCP in the next 2-3 days  Return for any worsening symptoms or go to the ER for further evaluation. Patient/family/caregiver  understands return   precautions and discharge instructions and is agreeable to the current plan   Impression:        Orders and Diagnoses for this visit    Amount and/or Complexity of Data Reviewed  ECG/medicine tests: independent interpretation performed.     Details: EKG done at 707 shows sinus rhythm rate of 84 Axis normal QT/QTc 362/427 T waves V1 unchanged compared to previous EKG except for the previous PVCs not present currently      Patient was seen with midlevel provider.  He complains of increasing erythema and swelling of his legs with increasing pain and edema of his extremities and scrotum.    Patient's lung sounds are diminished with his history of COPD  Heart tones are regular rate and rhythm  Abdomen nontender  2+ scrotal edema,  2+ lower extremity JENNIE,  Venous stasis/cellulitis changes lower extremities are noted,    Assessment,  COPD exacerbation  Lower extremity cellulitis    Plan:  Admit    P Coverdale MD  Procedure  Procedures     Homa Garner PA-C  12/08/24  2000       Homa Garner PA-C  12/08/24 2058       Randall Montes MD  12/11/24 6802

## 2024-12-09 ENCOUNTER — APPOINTMENT (OUTPATIENT)
Dept: VASCULAR MEDICINE | Facility: HOSPITAL | Age: 74
DRG: 303 | End: 2024-12-09
Payer: OTHER GOVERNMENT

## 2024-12-09 PROBLEM — H91.90 HEARING LOSS: Chronic | Status: ACTIVE | Noted: 2024-12-09

## 2024-12-09 PROBLEM — R52 PAIN, UNSPECIFIED: Status: RESOLVED | Noted: 2024-12-09 | Resolved: 2024-12-09

## 2024-12-09 PROBLEM — B02.9 SHINGLES: Status: RESOLVED | Noted: 2024-12-09 | Resolved: 2024-12-09

## 2024-12-09 PROBLEM — K76.0 FATTY LIVER: Status: ACTIVE | Noted: 2024-12-09

## 2024-12-09 PROBLEM — F17.200 NICOTINE DEPENDENCE: Status: ACTIVE | Noted: 2024-12-09

## 2024-12-09 PROBLEM — R51.9 HEADACHE: Status: RESOLVED | Noted: 2024-12-09 | Resolved: 2024-12-09

## 2024-12-09 PROBLEM — G89.29 CHRONIC BACK PAIN: Status: ACTIVE | Noted: 2024-12-09

## 2024-12-09 PROBLEM — N39.0 URINARY TRACT INFECTION, SITE NOT SPECIFIED: Status: RESOLVED | Noted: 2024-12-09 | Resolved: 2024-12-09

## 2024-12-09 PROBLEM — J44.9 CHRONIC OBSTRUCTIVE PULMONARY DISEASE (MULTI): Status: ACTIVE | Noted: 2024-12-09

## 2024-12-09 PROBLEM — R33.9 RETENTION OF URINE, UNSPECIFIED: Status: RESOLVED | Noted: 2024-12-09 | Resolved: 2024-12-09

## 2024-12-09 PROBLEM — H91.90 HEARING LOSS: Status: ACTIVE | Noted: 2024-12-09

## 2024-12-09 PROBLEM — E78.5 HYPERLIPIDEMIA: Status: ACTIVE | Noted: 2023-10-19

## 2024-12-09 PROBLEM — N40.1 BENIGN PROSTATIC HYPERPLASIA WITH LOWER URINARY TRACT SYMPTOMS: Status: ACTIVE | Noted: 2024-12-09

## 2024-12-09 PROBLEM — D72.829 LEUKOCYTOSIS: Status: RESOLVED | Noted: 2023-10-14 | Resolved: 2024-12-09

## 2024-12-09 PROBLEM — I26.09 ACUTE PULMONARY EMBOLISM WITH ACUTE COR PULMONALE (MULTI): Status: RESOLVED | Noted: 2023-10-19 | Resolved: 2024-12-09

## 2024-12-09 PROBLEM — N40.0 ENLARGED PROSTATE: Status: ACTIVE | Noted: 2024-12-09

## 2024-12-09 PROBLEM — N52.9 PRIMARY ERECTILE DYSFUNCTION: Status: ACTIVE | Noted: 2024-12-09

## 2024-12-09 PROBLEM — G47.00 INSOMNIA: Status: ACTIVE | Noted: 2024-12-09

## 2024-12-09 PROBLEM — M54.9 CHRONIC BACK PAIN: Status: ACTIVE | Noted: 2024-12-09

## 2024-12-09 PROBLEM — J44.9 CHRONIC OBSTRUCTIVE PULMONARY DISEASE, UNSPECIFIED: Status: ACTIVE | Noted: 2024-12-09

## 2024-12-09 PROBLEM — M19.90 OSTEOARTHRITIS: Status: ACTIVE | Noted: 2024-12-09

## 2024-12-09 PROBLEM — Z99.81 DEPENDENCE ON SUPPLEMENTAL OXYGEN: Status: ACTIVE | Noted: 2023-10-23

## 2024-12-09 PROBLEM — J96.01 ACUTE RESPIRATORY FAILURE WITH HYPOXIA (MULTI): Status: ACTIVE | Noted: 2024-12-09

## 2024-12-09 PROBLEM — M79.2 NEURALGIA: Status: ACTIVE | Noted: 2024-12-09

## 2024-12-09 PROBLEM — G43.909 MIGRAINE: Status: ACTIVE | Noted: 2024-12-09

## 2024-12-09 PROBLEM — Z86.711 HISTORY OF PULMONARY EMBOLISM: Status: RESOLVED | Noted: 2024-12-09 | Resolved: 2024-12-09

## 2024-12-09 PROBLEM — E87.29 RESPIRATORY ACIDOSIS: Status: RESOLVED | Noted: 2023-10-15 | Resolved: 2024-12-09

## 2024-12-09 PROBLEM — M25.562 ARTHRALGIA OF BOTH KNEES: Status: ACTIVE | Noted: 2024-12-09

## 2024-12-09 PROBLEM — E55.9 VITAMIN D DEFICIENCY: Status: RESOLVED | Noted: 2024-12-09 | Resolved: 2024-12-09

## 2024-12-09 PROBLEM — J96.92 HYPERCAPNIC RESPIRATORY FAILURE (MULTI): Status: ACTIVE | Noted: 2023-10-14

## 2024-12-09 PROBLEM — M25.561 ARTHRALGIA OF BOTH KNEES: Status: ACTIVE | Noted: 2024-12-09

## 2024-12-09 PROBLEM — I5A MYOCARDIAL INJURY: Status: RESOLVED | Noted: 2023-10-19 | Resolved: 2024-12-09

## 2024-12-09 PROBLEM — R79.89 ELEVATED TROPONIN: Status: RESOLVED | Noted: 2023-10-14 | Resolved: 2024-12-09

## 2024-12-09 LAB
ALBUMIN SERPL BCP-MCNC: 3.1 G/DL (ref 3.4–5)
ANION GAP SERPL CALC-SCNC: 11 MMOL/L (ref 10–20)
APPEARANCE UR: ABNORMAL
BACTERIA #/AREA URNS AUTO: ABNORMAL /HPF
BILIRUB UR STRIP.AUTO-MCNC: NEGATIVE MG/DL
BUN SERPL-MCNC: 10 MG/DL (ref 6–23)
CALCIUM SERPL-MCNC: 8.4 MG/DL (ref 8.6–10.3)
CHLORIDE SERPL-SCNC: 103 MMOL/L (ref 98–107)
CHLORIDE UR-SCNC: 87 MMOL/L
CHLORIDE/CREATININE (MMOL/G) IN URINE: 108 MMOL/G CREAT (ref 23–275)
CK SERPL-CCNC: 36 U/L (ref 0–325)
CO2 SERPL-SCNC: 28 MMOL/L (ref 21–32)
COLOR UR: COLORLESS
CREAT SERPL-MCNC: 1.03 MG/DL (ref 0.5–1.3)
CREAT UR-MCNC: 80.7 MG/DL (ref 20–370)
CREAT UR-MCNC: 80.7 MG/DL (ref 20–370)
EGFRCR SERPLBLD CKD-EPI 2021: 76 ML/MIN/1.73M*2
ERYTHROCYTE [DISTWIDTH] IN BLOOD BY AUTOMATED COUNT: 12.2 % (ref 11.5–14.5)
EST. AVERAGE GLUCOSE BLD GHB EST-MCNC: 131 MG/DL
GLUCOSE SERPL-MCNC: 107 MG/DL (ref 74–99)
GLUCOSE UR STRIP.AUTO-MCNC: NORMAL MG/DL
HBA1C MFR BLD: 6.2 %
HCT VFR BLD AUTO: 37 % (ref 41–52)
HGB BLD-MCNC: 11.9 G/DL (ref 13.5–17.5)
HOLD SPECIMEN: NORMAL
KETONES UR STRIP.AUTO-MCNC: NEGATIVE MG/DL
LEUKOCYTE ESTERASE UR QL STRIP.AUTO: ABNORMAL
MAGNESIUM SERPL-MCNC: 1.7 MG/DL (ref 1.6–2.4)
MCH RBC QN AUTO: 31.3 PG (ref 26–34)
MCHC RBC AUTO-ENTMCNC: 32.2 G/DL (ref 32–36)
MCV RBC AUTO: 97 FL (ref 80–100)
MRSA DNA SPEC QL NAA+PROBE: DETECTED
NITRITE UR QL STRIP.AUTO: ABNORMAL
NRBC BLD-RTO: 0 /100 WBCS (ref 0–0)
PH UR STRIP.AUTO: 6.5 [PH]
PHOSPHATE SERPL-MCNC: 4 MG/DL (ref 2.5–4.9)
PLATELET # BLD AUTO: 208 X10*3/UL (ref 150–450)
POTASSIUM SERPL-SCNC: 3.6 MMOL/L (ref 3.5–5.3)
POTASSIUM UR-SCNC: 20 MMOL/L
POTASSIUM/CREAT UR-RTO: 25 MMOL/G CREAT
PROT UR STRIP.AUTO-MCNC: ABNORMAL MG/DL
RBC # BLD AUTO: 3.8 X10*6/UL (ref 4.5–5.9)
RBC # UR STRIP.AUTO: ABNORMAL /UL
RBC #/AREA URNS AUTO: >20 /HPF
SODIUM SERPL-SCNC: 138 MMOL/L (ref 136–145)
SODIUM UR-SCNC: 88 MMOL/L
SODIUM/CREAT UR-RTO: 109 MMOL/G CREAT
SP GR UR STRIP.AUTO: 1.03
UREA/CREAT UR-SRTO: 4.9 G/G CREAT
UROBILINOGEN UR STRIP.AUTO-MCNC: NORMAL MG/DL
UUN UR-MCNC: 393 MG/DL
VANCOMYCIN SERPL-MCNC: 5.7 UG/ML (ref 5–20)
WBC # BLD AUTO: 5.7 X10*3/UL (ref 4.4–11.3)
WBC #/AREA URNS AUTO: >50 /HPF
WBC CLUMPS #/AREA URNS AUTO: ABNORMAL /HPF

## 2024-12-09 PROCEDURE — 2500000002 HC RX 250 W HCPCS SELF ADMINISTERED DRUGS (ALT 637 FOR MEDICARE OP, ALT 636 FOR OP/ED)

## 2024-12-09 PROCEDURE — 80202 ASSAY OF VANCOMYCIN: CPT

## 2024-12-09 PROCEDURE — 97161 PT EVAL LOW COMPLEX 20 MIN: CPT | Mod: GP

## 2024-12-09 PROCEDURE — 94760 N-INVAS EAR/PLS OXIMETRY 1: CPT

## 2024-12-09 PROCEDURE — 83735 ASSAY OF MAGNESIUM: CPT

## 2024-12-09 PROCEDURE — 87086 URINE CULTURE/COLONY COUNT: CPT | Mod: GEALAB

## 2024-12-09 PROCEDURE — 80069 RENAL FUNCTION PANEL: CPT

## 2024-12-09 PROCEDURE — 85027 COMPLETE CBC AUTOMATED: CPT

## 2024-12-09 PROCEDURE — 1100000001 HC PRIVATE ROOM DAILY

## 2024-12-09 PROCEDURE — 2500000001 HC RX 250 WO HCPCS SELF ADMINISTERED DRUGS (ALT 637 FOR MEDICARE OP)

## 2024-12-09 PROCEDURE — 83036 HEMOGLOBIN GLYCOSYLATED A1C: CPT | Mod: GEALAB

## 2024-12-09 PROCEDURE — 82550 ASSAY OF CK (CPK): CPT

## 2024-12-09 PROCEDURE — 84540 ASSAY OF URINE/UREA-N: CPT | Mod: GEALAB

## 2024-12-09 PROCEDURE — 93922 UPR/L XTREMITY ART 2 LEVELS: CPT

## 2024-12-09 PROCEDURE — 36415 COLL VENOUS BLD VENIPUNCTURE: CPT

## 2024-12-09 PROCEDURE — 93922 UPR/L XTREMITY ART 2 LEVELS: CPT | Performed by: SURGERY

## 2024-12-09 PROCEDURE — 84300 ASSAY OF URINE SODIUM: CPT | Mod: GEALAB

## 2024-12-09 PROCEDURE — 81001 URINALYSIS AUTO W/SCOPE: CPT

## 2024-12-09 PROCEDURE — 2500000005 HC RX 250 GENERAL PHARMACY W/O HCPCS

## 2024-12-09 PROCEDURE — 99223 1ST HOSP IP/OBS HIGH 75: CPT

## 2024-12-09 PROCEDURE — 87641 MR-STAPH DNA AMP PROBE: CPT

## 2024-12-09 PROCEDURE — 2500000004 HC RX 250 GENERAL PHARMACY W/ HCPCS (ALT 636 FOR OP/ED)

## 2024-12-09 RX ORDER — ROSUVASTATIN CALCIUM 10 MG/1
10 TABLET, COATED ORAL EVERY EVENING
Status: DISCONTINUED | OUTPATIENT
Start: 2024-12-09 | End: 2024-12-10 | Stop reason: HOSPADM

## 2024-12-09 RX ORDER — OXYCODONE HYDROCHLORIDE 10 MG/1
10 TABLET ORAL EVERY 6 HOURS PRN
Status: DISCONTINUED | OUTPATIENT
Start: 2024-12-09 | End: 2024-12-10 | Stop reason: HOSPADM

## 2024-12-09 RX ORDER — IPRATROPIUM BROMIDE AND ALBUTEROL SULFATE 2.5; .5 MG/3ML; MG/3ML
3 SOLUTION RESPIRATORY (INHALATION) 4 TIMES DAILY PRN
Status: DISCONTINUED | OUTPATIENT
Start: 2024-12-09 | End: 2024-12-09

## 2024-12-09 RX ORDER — CEFTRIAXONE 1 G/50ML
1 INJECTION, SOLUTION INTRAVENOUS EVERY 24 HOURS
Status: DISCONTINUED | OUTPATIENT
Start: 2024-12-09 | End: 2024-12-10 | Stop reason: HOSPADM

## 2024-12-09 RX ORDER — VANCOMYCIN HYDROCHLORIDE 1 G/20ML
INJECTION, POWDER, LYOPHILIZED, FOR SOLUTION INTRAVENOUS DAILY PRN
Status: DISCONTINUED | OUTPATIENT
Start: 2024-12-09 | End: 2024-12-10 | Stop reason: HOSPADM

## 2024-12-09 RX ORDER — ACETAMINOPHEN 160 MG/5ML
650 SOLUTION ORAL EVERY 8 HOURS PRN
Status: DISCONTINUED | OUTPATIENT
Start: 2024-12-09 | End: 2024-12-10 | Stop reason: HOSPADM

## 2024-12-09 RX ORDER — TAMSULOSIN HYDROCHLORIDE 0.4 MG/1
0.8 CAPSULE ORAL DAILY
Status: DISCONTINUED | OUTPATIENT
Start: 2024-12-09 | End: 2024-12-10 | Stop reason: HOSPADM

## 2024-12-09 RX ORDER — ACETAMINOPHEN 650 MG/1
650 SUPPOSITORY RECTAL EVERY 8 HOURS PRN
Status: DISCONTINUED | OUTPATIENT
Start: 2024-12-09 | End: 2024-12-10 | Stop reason: HOSPADM

## 2024-12-09 RX ORDER — IPRATROPIUM BROMIDE AND ALBUTEROL SULFATE 2.5; .5 MG/3ML; MG/3ML
3 SOLUTION RESPIRATORY (INHALATION) EVERY 2 HOUR PRN
Status: DISCONTINUED | OUTPATIENT
Start: 2024-12-09 | End: 2024-12-10 | Stop reason: HOSPADM

## 2024-12-09 RX ORDER — OXYCODONE HYDROCHLORIDE 5 MG/1
5 TABLET ORAL EVERY 6 HOURS PRN
Status: DISCONTINUED | OUTPATIENT
Start: 2024-12-09 | End: 2024-12-10 | Stop reason: HOSPADM

## 2024-12-09 RX ORDER — ENOXAPARIN SODIUM 100 MG/ML
40 INJECTION SUBCUTANEOUS EVERY 24 HOURS
Status: DISCONTINUED | OUTPATIENT
Start: 2024-12-09 | End: 2024-12-10 | Stop reason: HOSPADM

## 2024-12-09 RX ORDER — ACETAMINOPHEN 325 MG/1
975 TABLET ORAL EVERY 8 HOURS PRN
Status: DISCONTINUED | OUTPATIENT
Start: 2024-12-09 | End: 2024-12-10 | Stop reason: HOSPADM

## 2024-12-09 RX ORDER — POLYETHYLENE GLYCOL 3350 17 G/17G
17 POWDER, FOR SOLUTION ORAL DAILY PRN
Status: DISCONTINUED | OUTPATIENT
Start: 2024-12-09 | End: 2024-12-10 | Stop reason: HOSPADM

## 2024-12-09 RX ORDER — CYCLOBENZAPRINE HCL 5 MG
5 TABLET ORAL 3 TIMES DAILY PRN
Status: DISCONTINUED | OUTPATIENT
Start: 2024-12-09 | End: 2024-12-10 | Stop reason: HOSPADM

## 2024-12-09 ASSESSMENT — COGNITIVE AND FUNCTIONAL STATUS - GENERAL
CLIMB 3 TO 5 STEPS WITH RAILING: A LITTLE
DAILY ACTIVITIY SCORE: 24
MOBILITY SCORE: 22
WALKING IN HOSPITAL ROOM: A LITTLE
DAILY ACTIVITIY SCORE: 24
WALKING IN HOSPITAL ROOM: A LITTLE
MOBILITY SCORE: 22
CLIMB 3 TO 5 STEPS WITH RAILING: A LITTLE
MOBILITY SCORE: 22
WALKING IN HOSPITAL ROOM: A LITTLE
CLIMB 3 TO 5 STEPS WITH RAILING: A LITTLE

## 2024-12-09 ASSESSMENT — ENCOUNTER SYMPTOMS
VOMITING: 0
DIFFICULTY URINATING: 1
FEVER: 0
PALPITATIONS: 0
CHILLS: 0
FLANK PAIN: 1
ABDOMINAL PAIN: 0
BACK PAIN: 1
ARTHRALGIAS: 1
CONSTIPATION: 0
NAUSEA: 0
SHORTNESS OF BREATH: 0
DIARRHEA: 0
APPETITE CHANGE: 0
DYSURIA: 1

## 2024-12-09 ASSESSMENT — PAIN - FUNCTIONAL ASSESSMENT
PAIN_FUNCTIONAL_ASSESSMENT: 0-10
PAIN_FUNCTIONAL_ASSESSMENT: 0-10

## 2024-12-09 ASSESSMENT — ACTIVITIES OF DAILY LIVING (ADL)
ADL_ASSISTANCE: INDEPENDENT
LACK_OF_TRANSPORTATION: NO

## 2024-12-09 ASSESSMENT — PAIN SCALES - GENERAL
PAINLEVEL_OUTOF10: 0 - NO PAIN

## 2024-12-09 NOTE — NURSING NOTE
0730 Assumed care of pt.     0911 Medications given and assessment completed on pt. No complaints of pain or discomfort. Wound care nurse Alnita at bedside to assess legs and dress as she feels is appropriate.

## 2024-12-09 NOTE — CONSULTS
Vancomycin Dosing by Pharmacy- INITIAL    Christiano Cox is a 74 y.o. year old male who Pharmacy has been consulted for vancomycin dosing for cellulitis, skin and soft tissue. Based on the patient's indication and renal status this patient will be dosed based on a goal trough/random level of 10-15.     Renal function is currently declining/REYNOLD.    Visit Vitals  /58 (BP Location: Right arm, Patient Position: Sitting)   Pulse 81   Temp 36.8 °C (98.2 °F) (Temporal)   Resp 18        Lab Results   Component Value Date    CREATININE 1.39 (H) 2024    CREATININE 0.86 2024    CREATININE 1.15 11/15/2024    CREATININE 1.06 2024        Patient weight is as follows:   Vitals:    24 2307   Weight: 127 kg (279 lb 15.8 oz)       Cultures:  No results found for the encounter in last 14 days.        No intake/output data recorded.  I/O during current shift:  I/O this shift:  In: -   Out: 120 [Urine:120]    Temp (24hrs), Av.7 °C (98 °F), Min:36.1 °C (97 °F), Max:37.4 °C (99.3 °F)         Assessment/Plan     Patient has already been given a loading dose of 2,000 mg on 24 at [facility] 20:18.    Will initiate vancomycin maintenance dose by level due to REYNOLD. Patient will only be re-dosed when at or below goal.    Follow-up level will be ordered on 24 at 19:00 unless clinically indicated sooner.    Will continue to monitor renal function daily while on vancomycin and order serum creatinine at least every 48 hours if not already ordered.    Follow for continued vancomycin needs, clinical response, and signs/symptoms of toxicity.       Henny Gayle, AmeliaD

## 2024-12-09 NOTE — CONSULTS
"  Wound Care Consult     Visit Date: 12/9/2024      Patient Name: Christiano Cox         MRN: [id number]           YOB: 1950     Reason for Consult: BLE wounds      Wound History:  Present on admission     Pertinent Labs:   Albumin   Date Value Ref Range Status   12/09/2024 3.1 (L) 3.4 - 5.0 g/dL Final       Wound Assessment:  Wound 12/08/24 Pretibial Right (Active)   Wound Image    12/08/24 2309   Drainage Description None 12/09/24 1051   Drainage Amount None 12/09/24 1051   Dressing ABD;Impregnated gauze;Other (Comment) 12/09/24 1051   Dressing Changed New 12/09/24 1051       Wound 12/08/24 Pretibial Left (Active)   Wound Image    12/08/24 2310   Dressing Open to air 12/08/24 2336       Wound Team Summary Assessment:   73 y/o CM was admitted 12-8-24 with BLE cellulitis and UTI.  Christiano reports he has had a lot going on since he fell from a ladder in November. Further, he reports that the discoloration to his BLE is \"sort of normal\" for him, but not the redness.  He does have a ruborous red/purple discoloration to BLE which is a greater area on left than right, it is outlined and well within the marked edges. The legs are firm with edema.  On the anterior LLE there are a few small, dry s/s scabs but no open wounds nor blisters are seen. Goal:  moist healing, protection, edema control.  Patient does not want to elevate legs but did allow me to provide wound care and ace wraps and reported this felt good on his legs.  He states he does not plan to stay here beyond today.       Wound Team Plan:   Adaptic. ABD, ace, elevation  BLE.     Tevin Callahan RN  12/9/2024  2:39 PM        "

## 2024-12-09 NOTE — PROGRESS NOTES
Occupational Therapy                 Therapy Communication Note    Patient Name: Christiano Cox  MRN: [id number]  Department: [address]  Room: 128/[facility]  Today's Date: 12/9/2024     Discipline: Occupational Therapy    Missed Visit Reason:  1235, discussed with evaluating PT. Reporting pt currently presents near his baseline with no skilled OT needs identified in this setting. Consult discontinued, please re-consult if needs change prior to discharge.     Missed Time: Cancel

## 2024-12-09 NOTE — SIGNIFICANT EVENT
UPDATED ASSESSMENT AND PLAN:    Brief HPI   74 y.o. year old male  patient with PMHx of COPD (on 1-2L NC at baseline), DVT, PE, BPH, chronic back pain, and osteoarthritis who presented to [facility] as a transfer from [place] for further management of UTI and lower extremity edema. During recent admission at [place], patient was noted to have UTI and culture was growing MDRO morganella morganii - he was treated with a 7-day course of antibiotics for this using ceftriaxone and cefuroxime based on sensitivity results.  He denies chest pain, palpitations, GI symptoms.     Patient currently reports sharp suprapubic pain that occurs with movement of his lower extremities. CT abdomen/pelvis was performed yesterday without acute abnormality, likely musculoskeletal pain. Otherwise patient has no complaints and is resting comfortably on his baseline home oxygen of 2 liters.         Vitals     Vitals:    12/09/24 1105   BP:    Pulse:    Resp:    Temp:    SpO2: 93%        On Examination     Constitutional: Pleasant, Awake/Alert/Oriented to person place and time. No distress  Head: Atraumatic, Normocephalic  Eyes: EOMI. LAURA  Neck: Enlarged neck circumference, No JVD  Cardiovascular: Regular rate and rhythm, S1, S2. No extra heart sounds or murmurs  Respiratory: Clear to auscultation bilaterally. No wheezing, rales or rhonchi. Good chest wall expansion  Abdomen: Soft, Nontender, Obese. Bowel sounds appreciated  Musculoskeletal: ROM intact. Muscle strength grossly intact upper and lower extremities 5/5.   Neurological: CNII-XII intact. Sensation grossly intact  Extremities: Warm and dry. No acute rashes and lesions  Psychiatric: Appropriate mood and affect       Labs     Results for orders placed or performed during the hospital encounter of 12/08/24 (from the past 24 hours)   Urinalysis with Reflex Culture and Microscopic   Result Value Ref Range    Color, Urine Colorless (N) Light-Yellow, Yellow, Dark-Yellow     Appearance, Urine Turbid (N) Clear    Specific Gravity, Urine 1.033 1.005 - 1.035    pH, Urine 6.5 5.0, 5.5, 6.0, 6.5, 7.0, 7.5, 8.0    Protein, Urine 30 (1+) (A) NEGATIVE, 10 (TRACE), 20 (TRACE) mg/dL    Glucose, Urine Normal Normal mg/dL    Blood, Urine 0.2 (2+) (A) NEGATIVE    Ketones, Urine NEGATIVE NEGATIVE mg/dL    Bilirubin, Urine NEGATIVE NEGATIVE    Urobilinogen, Urine Normal Normal mg/dL    Nitrite, Urine 1+ (A) NEGATIVE    Leukocyte Esterase, Urine 500 Dasha/µL (A) NEGATIVE   Extra Urine Gray Tube   Result Value Ref Range    Extra Tube Hold for add-ons.    Microscopic Only, Urine   Result Value Ref Range    WBC, Urine >50 (A) 1-5, NONE /HPF    WBC Clumps, Urine RARE Reference range not established. /HPF    RBC, Urine >20 (A) NONE, 1-2, 3-5 /HPF    Bacteria, Urine 1+ (A) NONE SEEN /HPF   MRSA Surveillance for Vancomycin De-escalation, PCR    Specimen: Anterior Nares; Swab   Result Value Ref Range    MRSA PCR Detected (A) Not Detected   Urine electrolytes   Result Value Ref Range    Sodium, Urine Random 88 mmol/L    Sodium/Creatinine Ratio 109 Not established. mmol/g Creat    Potassium, Urine Random 20 mmol/L    Potassium/Creatinine Ratio 25 Not established mmol/g Creat    Chloride, Urine Random 87 mmol/L    Chloride/Creatinine Ratio 108 23 - 275 mmol/g creat    Creatinine, Urine Random 80.7 20.0 - 370.0 mg/dL   Urea Nitrogen, Urine Random   Result Value Ref Range    Urea Nitrogen, Urine Random 393 mg/dL    Creatinine, Urine Random 80.7 20.0 - 370.0 mg/dL    Urea Nitrogen/Creatinine Ratio 4.9 Not established. g/g creat   Creatine Kinase   Result Value Ref Range    Creatine Kinase 36 0 - 325 U/L   CBC   Result Value Ref Range    WBC 5.7 4.4 - 11.3 x10*3/uL    nRBC 0.0 0.0 - 0.0 /100 WBCs    RBC 3.80 (L) 4.50 - 5.90 x10*6/uL    Hemoglobin 11.9 (L) 13.5 - 17.5 g/dL    Hematocrit 37.0 (L) 41.0 - 52.0 %    MCV 97 80 - 100 fL    MCH 31.3 26.0 - 34.0 pg    MCHC 32.2 32.0 - 36.0 g/dL    RDW 12.2 11.5 - 14.5 %     Platelets 208 150 - 450 x10*3/uL   Renal Function Panel   Result Value Ref Range    Glucose 107 (H) 74 - 99 mg/dL    Sodium 138 136 - 145 mmol/L    Potassium 3.6 3.5 - 5.3 mmol/L    Chloride 103 98 - 107 mmol/L    Bicarbonate 28 21 - 32 mmol/L    Anion Gap 11 10 - 20 mmol/L    Urea Nitrogen 10 6 - 23 mg/dL    Creatinine 1.03 0.50 - 1.30 mg/dL    eGFR 76 >60 mL/min/1.73m*2    Calcium 8.4 (L) 8.6 - 10.3 mg/dL    Phosphorus 4.0 2.5 - 4.9 mg/dL    Albumin 3.1 (L) 3.4 - 5.0 g/dL   Magnesium   Result Value Ref Range    Magnesium 1.70 1.60 - 2.40 mg/dL   Hemoglobin A1c   Result Value Ref Range    Hemoglobin A1C 6.2 (H) See comment %    Estimated Average Glucose 131 Not Established mg/dL        Imaging     Vascular US ankle brachial index (GENARO) without exercise    Result Date: 12/9/2024  Preliminary Cardiology Report         [place] [address]  Tel [phone] and Fax [phone]            Preliminary Vascular Lab Report  [facility] US ANKLE BRACHIAL INDEX (GENARO) WITHOUT EXERCISE  Patient Name:      YVETTE Carroll Physician:  [id number] Boni Langley MD Study Date:        12/9/2024       Ordering Physician: [id number] MARISA CORONA MRN/PID:           [id number]        Technologist:       Miriam Gutierrez ANDRES Accession#:        [id number]    Technologist 2: Date of Birth/Age: 1950       Encounter#:         [id number] Gender:            M Admission Status:  Inpatient       Location Performed: [facility]  Diagnosis/ICD: Other specified symptoms and signs involving the circulatory and                respiratory systems-R09.89 Procedure/CPT: 29256 Peripheral artery GENARO Only  PRELIMINARY CONCLUSIONS: Right Lower PVR: No evidence of arterial occlusive disease in the right lower extremity at rest. Normal digital perfusion noted. Multiphasic flow is noted in the right common femoral artery, right posterior tibial artery and right dorsalis pedis artery. Left Lower PVR: No  evidence of arterial occlusive disease in the left lower extremity at rest. Decreased digital perfusion noted. Multiphasic flow is noted in the left common femoral artery, left posterior tibial artery and left dorsalis pedis artery.  Imaging & Doppler Findings:  RIGHT Lower PVR                Pressures Ratios Right Posterior Tibial (Ankle) 143 mmHg  1.07 Right Dorsalis Pedis (Ankle)   136 mmHg  1.01 Right Digit (Great Toe)        81 mmHg   0.60   LEFT Lower PVR                Pressures Ratios Left Posterior Tibial (Ankle) 132 mmHg  0.99 Left Dorsalis Pedis (Ankle)   127 mmHg  0.95 Left Digit (Great Toe)        51 mmHg   0.38                      Right     Left Brachial Pressure 134 mmHg 128 mmHg   VASCULAR PRELIMINARY REPORT completed by Miriam Gutierrez RVT on 12/9/2024 at 8:54:39 AM  ** Final **     CT chest abdomen pelvis w IV contrast    Result Date: 12/8/2024  Interpreted By:  Rory Gudino, STUDY: CT CHEST ABDOMEN PELVIS W IV CONTRAST;  12/8/2024 8:20 pm   INDICATION: Signs/Symptoms:increased sob , pelvis and LE edema , diff urination   COMPARISON: CT chest abdomen pelvis 11/13/2024.   ACCESSION NUMBER(S): [id number]   ORDERING CLINICIAN: CHRISTIANE GARCIA   TECHNIQUE: CT of the chest, abdomen, and pelvis was performed. Contiguous axial images were obtained through the chest, abdomen and pelvis. Coronal and sagittal reconstructions were performed.   The images were obtained in the portal venous phase.   75 ml of contrast material Omnipaque 350 were administered intravenously without immediate complication.   FINDINGS: CHEST:   LUNG/PLEURA/LARGE AIRWAYS: There are no pleural effusions. There is no evidence of pneumothorax. There are no consolidations. The tracheobronchial tree is patent. Mild subsegmental atelectasis in the anterior lingula and anterior right middle lobe, unchanged. Mild upper lobe predominant centrilobular emphysema.   VESSELS: Thoracic aorta is ectatic measuring up to 4.0 cm at the pulmonary  trunk. Aortic valve atherosclerotic calcifications are present. There are moderate coronary artery atherosclerotic calcifications.   Main pulmonary artery and its branches are normal in caliber.   HEART: The heart is normal in size.   There is no pericardial effusion.   MEDIASTINUM AND GRAEME: No pneumomediastinum, abnormal mediastinal fluid collection or mediastinal hematoma is appreciated. No mediastinal, hilar or axillary adenopathy is present.  The esophagus is grossly normal.   CHEST WALL AND LOWER NECK: No acute fracture or dislocation of the included osseous structures are appreciated.  No suspicious osseous lesions are identified.  The thoracic wall soft tissues are within normal limits.   ABDOMEN:   LIVER: The liver is enlarged and normal in contour. Relative hypodensity of the liver suggestive of diffuse hepatic steatosis.   GALLBLADDER: The gallbladder is contracted, without evidence of radiopaque stone.   BILE DUCTS: The intahepatic and extrahepatic bile ducts are not dilated.   PANCREAS: The pancreas appears unremarkable.   SPLEEN: No parenchymal perfusion deficit of the spleen is appreciated to suggest contusion or laceration. There is no subcapsular hematoma, no perisplenic fluid collection.   ADRENAL GLANDS: The bilateral adrenal glands are unremarkable in appearance.   KIDNEYS AND URETERS: There are unchanged hypodense exophytic lesions arising from the right midpole and lower pole kidney likely representing proteinaceous cyst but incompletely characterized.. The kidneys are normal in size. There is no hydronephrosis. There is no nephrolithiasis.   PELVIS:   BLADDER: The urinary bladder is grossly normal.   REPRODUCTIVE ORGANS: Prostate is enlarged measuring 5.6 cm transversely.   BOWEL: The stomach is unremarkable.  The small bowel is normal in caliber without evidence of focal wall thickening or obstruction.  There is no evidence of focal wall thickening or dilatation of the large bowel.  Appendix  is normal.   VESSELS: The aorta and IVC are normal in caliber. There are moderate atherosclerotic plaque of the aorta. The principal vasculature of the abdomen and pelvis is patent.   PERITONEUM/RETROPERITONEUM/LYMPH NODES: There is no evidence of intra- or retroperitoneal hematoma.  There is no free or loculated fluid collection, no free intraperitoneal air. No abdominopelvic lymphadenopathy is present.   BONES AND ABDOMINAL WALL: No evidence of acute fracture or dislocation of the included osseous structures.  No suspicious osseous lesions are identified. Unchanged chronic compression deformities of T11 and T12 vertebral bodies. Small bilateral fat containing inguinal hernias. Partially visualized thickening of the left gluteus tomas muscle.       CHEST 1.  No acute abnormality in the chest. Aortic valve and coronary artery atherosclerotic calcifications are present.   ABDOMEN - PELVIS 1.  No acute abnormality in the abdomen/pelvis. Partially visualized thickening of the left gluteus tomas muscle  related to muscular hematoma described in greater detail on recent MRI pelvis. 2. Significant prostatomegaly. Right-sided renal cysts similar to prior incompletely characterized, likely representing proteinaceous cysts. Hepatomegaly and diffuse hepatic steatosis.     Signed by: Rory Gudino 12/8/2024 8:54 PM Dictation workstation:   [id number]    Vascular US Lower Extremity Venous Duplex Bilateral    Result Date: 12/8/2024  Interpreted By:  Christiano Larry, STUDY: [facility] US LOWER EXTREMITY VENOUS DUPLEX BILATERAL  12/8/2024 8:06 pm   INDICATION: 75 y/o   M with  Signs/Symptoms:swelling. LMP:  Unknown.       COMPARISON: None.   ACCESSION NUMBER(S): [id number]   ORDERING CLINICIAN: CHRISTIANE GARCIA   TECHNIQUE: Routine ultrasound of the  bilateral lower extremity was performed with duplex Doppler (color and spectral) evaluation.   Static images were obtained for remote interpretation.   FINDINGS: THIGH VEINS:  The common  femoral, femoral, popliteal, proximal medial saphenous, and deep femoral veins are patent and free of thrombus. The veins are normally compressible.  They demonstrate normal phasic flow and augmentation response.   CALF VEINS:  The paired peroneal and posterior tibial calf veins are patent.       Negative study.  No deep venous thrombosis of the  bilateral lower extremity.   MACRO: None   Signed by: Christiano Larry 12/8/2024 8:25 PM Dictation workstation:   [id number]    ECG 12 lead    Result Date: 11/15/2024  Sinus rhythm with occasional Premature ventricular complexes Possible Anterolateral infarct (cited on or before 15-AUG-2021) Abnormal ECG When compared with ECG of 13-NOV-2024 14:12, Premature ventricular complexes are now Present QRS axis Shifted left See ED provider note for full interpretation and clinical correlation Confirmed by Alex Paz ([id number]) on 11/15/2024 3:12:11 PM    MR hip left wo IV contrast    Result Date: 11/14/2024  Interpreted By:  Ken Christie and Abuhamdeh Imran STUDY: MRI of the pelvis and  left hip without IV contrast;  11/14/2024 2:20 pm   INDICATION: Signs/Symptoms:fall off ladder, hip pain.     COMPARISON: CT cap 11/13/2024   ACCESSION NUMBER(S): [id number]   ORDERING CLINICIAN: OSCAR BANERJEE   TECHNIQUE: MR imaging of the pelvis and  left hip was obtained  without administration of intravenous contrast medium.   FINDINGS: TENDONS: There is partial-thickness tearing of the distal left hip abductor tendons presently of the gluteus minimus. There is partial-thickness tearing of the left hamstring tendon origin. There is partial tearing of the hip adductor tendons bilaterally from the pubic bones with associated edema in the musculature. The right hip abductors are intact. The right hamstring tendons are intact.   JOINTS: Dedicated imaging of the  left hip demonstrates no significant cartilaginous defect or arthrosis. The acetabular labrum is grossly intact given the  limitations of a non-arthrographic study.   Limited large field-of-view evaluation of the contralateral hip demonstrates no gross abnormalities.   There is a small left hip joint effusion. There is a moderate amount of fluid adjacent to the left greater trochanter.   There is no evidence of avascular necrosis.   There is severe degenerative changes in the sacroiliac joints bilaterally presently on the right with marrow reactive changes and subchondral cyst formation. Small amount of right SI joint effusion as well.   Increased amount of fluid present in the pubic symphysis.   The lower lumbar spine is grossly unremarkable.   OSSEOUS STRUCTURES: No focal marrow replacing lesions are identified. There is no fracture.   SOFT TISSUES: There is extensive intramuscular and epimysial edema with pockets of fluid signal on both the T2 and T1 sequences predominantly involving the left gluteus tomas muscle compatible with a moderate grade strain. Additional mild intramuscular feathery edema is seen of the left gluteus medius and right gluteus tomas muscles compatible with low-grade strains. Mild feathery edema is also seen of the proximal vastus lateralis muscles bilaterally suggestive of low-grade strains. There is T2 hyperintense/T1 isointense signal abnormality noted along the soft tissues anterosuperior to the pubic symphysis in the region of the distal abdominal musculature it and the surrounding soft tissues.. The sciatic nerves are intact and unremarkable.   INTERNAL ORGANS: Evaluation of the internal organs of the pelvis is limited on this study tailored for evaluation of the musculoskeletal system. Large fat containing inguinal hernias present bilaterally worse on the right. No gross abnormality is seen in the pelvic organs.       1. Severe muscle strain of the left gluteus tomas muscle with areas of intramuscular hematomas. Moderate muscle strain in the right gluteus tomas musculature and in the left gluteus  medius musculature. 2. Moderate left trochanteric bursitis which may be hemorrhagic. 3. Moderate muscle edema and strain in the distal abdominal musculature at their insertion on the pubic symphysis on the left. Small amount of fluid in the pubic symphysis as well with marrow reactive changes. Findings likely posttraumatic given patient's history 4. Partial-thickness tearing of the left hamstring tendon origin. Partial-thickness tearing of the left hip abductor tendons. Mild sprain of the bilateral adductor tendon origin 5. No fracture seen.     I personally reviewed the images/study and I agree with the findings as stated. This study was interpreted at [place], [place].   MACRO: None   Signed by: Ken Christie 11/14/2024 3:54 PM Dictation workstation:   [id number]    ECG 12 lead    Result Date: 11/13/2024  Normal sinus rhythm Anterolateral infarct (cited on or before 15-AUG-2021) Abnormal ECG When compared with ECG of 15-AUG-2021 20:39, QRS axis Shifted right Questionable change in initial forces of Lateral leads See ED provider note for full interpretation and clinical correlation Confirmed by Alex Paz ([id number]) on 11/13/2024 9:34:42 PM    CT angio chest for pulmonary embolism    Result Date: 11/13/2024  Interpreted By:  Aramis Durand, STUDY: CT ANGIO CHEST FOR PULMONARY EMBOLISM; 11/13/2024 5:29 pm   INDICATION: Signs/Symptoms:hypoxia, fell two weeks ago, mostly immobile since then.   COMPARISON: None   ACCESSION NUMBER(S): [id number]   ORDERING CLINICIAN: SUSIE KNOX   TECHNIQUE: Contiguous axial images of the thorax were obtained from the level of the thoracic inlet through the lung bases. 3-D MIPS were created, processed and reviewed on a separate workstation. 100 ml of Omnipaque 350 was utilized. All CT examinations are performed with 1 or more of the following dose reduction techniques: Automated exposure control, adjustment of mA and/or kv  according to patient's size, or use of iterative reconstruction techniques.   FINDINGS: The thyroid gland is unremarkable.   There is adequate contrast opacification of the pulmonary arterial vasculature. No filling defect or vessel cutoff to indicate pulmonary embolus.   The heart size is within normal limits.  No pericardial effusion is identified. The aorta and pulmonary arteries are stable in caliber. Ascending aorta is mildly ectatic measuring 4.3 cm in diameter, unchanged. No thoracic aortic dissection.   There are no pathologically enlarged mediastinal, hilar, or axillary lymph nodes are seen.   The trachea and mainstem bronchi are patent. No suspicious pulmonary nodules are seen. A few bandlike opacities in the lung bases most likely subsegmental atelectasis.. There is no evidence of pneumothorax or pleural effusion.   The visualized osseous structures are intact. Again seen is an old healed compression fracture of T12. Anterior wedging of L1, unchanged.   Limited images through the upper abdomen are unremarkable.       1. No pulmonary embolus. 2. Mild centrilobular emphysema. 3. No acute intrathoracic process. No significant interval change from 11/13/2020     Signed by: Aramis Durand 11/13/2024 6:24 PM Dictation workstation:   [id number]    CT lumbar spine wo IV contrast    Result Date: 11/13/2024  Interpreted By:  Brenda Hernández, STUDY: CT LUMBAR SPINE WO IV CONTRAST  11/13/2024 3:16 pm   INDICATION: Signs/Symptoms:back pain after fall from ladder two weeks ago, 6 foot fall from a ladder     COMPARISON: None.   ACCESSION NUMBER(S): [id number]   ORDERING CLINICIAN: SUSIE KNOX   TECHNIQUE: Axial CT images of the lumbar spine are obtained. Axial, coronal and sagittal reconstructions are provided for review.   FINDINGS: There is mild anterior wedging of the 1st lumbar vertebral body which is old. No acute fracture of the lumbosacral spine is seen.   There is a grade 1 spondylolisthesis of L5 on S1  measuring 7 mm with spondylolysis. There is degenerative disc space narrowing most pronounced at the L4-5 and L5-S1 levels as well as the L3-4 level with vacuum disc phenomenon at all 3 levels. There is spinal stenosis at L4-5 and to a greater extent the L3-4 levels. There is also spondylotic spurring of all of the lumbar endplates.       No acute fracture of the lumbosacral spine.   Grade 1 spondylolisthesis of L5 on S1 secondary to spondylolysis.   Degenerative disc disease from L3-4 through L5-S1 levels with lumbar spondylosis. Spinal stenosis at L3-4 and L4-5 levels.   Anterior wedging of the 1st lumbar vertebral body which is old.   MACRO: None   Signed by: Brenda Hernández 11/13/2024 4:47 PM Dictation workstation:   [id number]    CT chest abdomen pelvis w IV contrast    Result Date: 11/13/2024  Interpreted By:  Brenda Hernández, STUDY: CT CHEST ABDOMEN PELVIS W IV CONTRAST;  11/13/2024 3:16 pm   INDICATION: Signs/Symptoms: Dyspnea and right-sided rib pain after falling 2 weeks ago from a ladder     COMPARISON: CT chest from 08/15/2021   ACCESSION NUMBER(S): [id number]   ORDERING CLINICIAN: SUSIE KNOX   TECHNIQUE: CT of the chest, abdomen, and pelvis was performed.  Contiguous axial images were obtained at 3 mm slice thickness through the chest, abdomen and pelvis. Coronal and sagittal reconstructions at 3 mm slice thickness were performed. 75 ML of Omnipaque 350 was administered intravenously without immediate complication.   FINDINGS: CHEST:   LUNG/PLEURA/LARGE AIRWAYS: Centrilobular pulmonary emphysema is noted. A 2-3 mm calcified granuloma is present within the left lower lobe. There is some discoid atelectasis within the lingula. There is also some discoid atelectasis at the base of the left lower lobe. No pleural abnormality is seen.   VESSELS: There is dilatation of the ascending aorta with diameter of 4.3 cm. No dissection of the thoracic aorta is observed. The great vessels are unremarkable. There is mild  atherosclerosis of the thoracic aorta noted. There is calcification aortic valve leaflets.   HEART: The heart is not enlarged. There is no pericardial abnormality seen. Coronary artery calcifications are present.   MEDIASTINUM AND GRAEME: There is no mediastinal hematoma. No mediastinal or hilar lymphadenopathy or mass is seen.   CHEST WALL AND LOWER NECK: There is an old healed compression fracture of the 12th thoracic vertebral body noted. This compression fracture was observed on prior CT chest from 08/15/2021. No acute fracture of the thoracic cage is seen. There is elevation of the left hemidiaphragm.   ABDOMEN:   LIVER: Fatty infiltration of the liver is observed with minimal focal fatty sparing in the liver adjacent to the gallbladder fossa.   BILE DUCTS: The intrahepatic and extrahepatic ducts are not dilated.   GALLBLADDER: No calcified stones. No wall thickening.   PANCREAS: The pancreas appears unremarkable without evidence of ductal dilatation or masses.   SPLEEN: The spleen is normal in size without focal lesions.   ADRENAL GLANDS: The left adrenal gland is unremarkable in its appearance. There is a stable 2.4 cm right adrenal mass containing fat.   KIDNEYS AND URETERS: There is a 1.6 cm exophytic hypodense lesion arising from the interpolar region of the right kidney laterally. There is a 2nd 1.6 cm exophytic hypodense lesion arising from lower pole of the right kidney. These may represent slightly complex cysts. Nonemergent renal ultrasound is recommended. The left kidney is unremarkable.   PELVIS:   BLADDER: The urinary bladder appears normal without abnormal wall thickening.   REPRODUCTIVE ORGANS: The prostate gland is enlarged measuring 5.7 cm in transverse dimension.   BOWEL: There are a few diverticula of the colon without diverticulitis. No abnormal bowel distention or bowel wall thickening is seen. No appendicitis is observed.     VESSELS: Atherosclerosis of the abdominal aorta is seen without  aneurysmal dilatation.   PERITONEUM/RETROPERITONEUM/LYMPH NODES: No free fluid or free intraperitoneal air is seen.   BONE AND SOFT TISSUE: A small fat containing umbilical hernia is seen. Bilateral fat containing inguinal hernias are also seen, right considerably larger than left. A grade 1 spondylolisthesis of L5 on S1 is seen with degenerative disc space narrowing from the L3-4 through the L5-S1 levels. There is anterior wedging of the 1st lumbar vertebral body which is old. There is some increased soft tissue density about the pubic symphysis, a little more pronounced to the left of midline.       CHEST: 1.  Elevated left hemidiaphragm with discoid atelectasis in the lingula and within the left lower lobe inferiorly. 2. Centrilobular pulmonary emphysema. 3. Dilated ascending aorta with diameter of 4.3 cm. Calcified granuloma left lower lobe. 4. Calcified granuloma left lower lobe. 5. Old healed compression fracture of T12 with anterior wedging of the L1 which is also old.   ABDOMEN-PELVIS: 1.  Fatty infiltration of the liver. 2. Stable 2.4 cm fat containing right adrenal mass consistent with stable adrenal myelolipoma. 3. 2 exophytic hypodense right renal lesions most likely cysts with nonemergent renal ultrasound advised. 4. Enlarged prostate gland. 5. Small fat containing umbilical hernia with bilateral fat containing inguinal hernias greater on the right than on the left. 6. Grade 1 spondylolisthesis of L5 on S1 with degenerative disc space narrowing from L3-4 through the L5-S1 levels. 7. Minimal colonic diverticulosis. 8. Increased soft tissue density about the pubic symphysis a little more pronounced to the left of midline which could represent small hematoma at this site without disruption of the pubic symphysis or fracture of the pubis.     MACRO: None   Signed by: Brenda Hernández 11/13/2024 4:26 PM Dictation workstation:   [id number]    CT thoracic spine wo IV contrast    Result Date:  11/13/2024  Interpreted By:  Brenda Hernández, STUDY: CT THORACIC SPINE WO IV CONTRAST;  11/13/2024 3:16 pm   INDICATION: Signs/Symptoms:back pain after fall from ladder two weeks ago, 6 foot fall from a ladder.     COMPARISON: None.   ACCESSION NUMBER(S): [id number]   ORDERING CLINICIAN: SUSIE KNOX   TECHNIQUE: Axial CT images of the thoracic spine are obtained. Axial, coronal and sagittal reconstructions are submitted for review.   FINDINGS: There is no acute fracture of the thoracic spine. There are several Schmorl's nodes observed in the thoracic region. Disc space narrowing is seen at the T11-12 level with an old and healed compression fracture of the 12th thoracic vertebral body identified. There is endplate spurring in the lower thoracic region.   No paraspinous soft tissue mass or paraspinal hematoma is identified.       Old healed compression fracture of the 12th thoracic vertebral body.   No acute fracture of the thoracic spine is seen.   MACRO: None   Signed by: Brenda Hernández 11/13/2024 4:23 PM Dictation workstation:   [id number]    CT cervical spine wo IV contrast    Result Date: 11/13/2024  Interpreted By:  Aramis Durand, STUDY: CT CERVICAL SPINE WO IV CONTRAST; 11/13/2024 3:14 pm   INDICATION: Signs/Symptoms:back pain after fall from ladder two weeks ago, 6 foot fall from a ladder;   COMPARISON: None   ACCESSION NUMBER(S): [id number]   ORDERING CLINICIAN: SUSIE KNOX   TECHNIQUE: Contiguous axial images were acquired from the skull base to the lung apices. Coronal and sagittal reformatted images were obtained. All CT examinations are performed with 1 or more of the following dose reduction techniques: Automated exposure control, adjustment of mA and/or kv according to patient's size, or use of iterative reconstruction techniques.   FINDINGS: There is straightening of the normal cervical lordosis.  No acute fracture or spondylolisthesis is identified. Facet degenerative changes and uncovertebral  joint degenerative changes are present.     The occipital condyles, arch of C1, and the odontoid processes are intact. The atlantoaxial relationship is well maintained.   There is moderate-severe disc space narrowing at C3-4. Moderate disc space narrowing at C4-5. Moderate-severe disc space narrowing at C5-6 and C6-7. Mild-moderate disc space narrowing at C7-T1.   No evidence for high-grade bony spinal canal stenosis. There are levels of mild stenosis.   There is multilevel high-grade bilateral neural foramina stenosis.       1. No acute fracture or spondylolisthesis. 2. Degenerative disc disease and spondylosis as described in the body of the report.     Signed by: Aramis Durand 11/13/2024 4:09 PM Dictation workstation:   [id number]    CT head wo IV contrast    Result Date: 11/13/2024  Interpreted By:  Aramis Durand, STUDY: SUSIE KNOX; 11/13/2024 3:14 pm   INDICATION: Signs/Symptoms:fall two weeks ago, persistent weakness;   COMPARISON: None   ACCESSION NUMBER(S): [id number]   ORDERING CLINICIAN: SUSIE KNOX   TECHNIQUE: Contiguous axial images were acquired from the vertex through the posterior fossa without IV contrast. All CT examinations are performed with 1 or more of the following dose reduction techniques: Automated exposure control, adjustment of mA and/or kv according to patient's size, or use of iterative reconstruction techniques.   FINDINGS: No focal mass effect or midline shift is identified. Old lacunar infarct in the caudate nuclei bilaterally. Mild degree of nonspecific white matter hypodensity most consistent with chronic small-vessel ischemic disease. The ventricles and sulci are symmetric and appropriate for the patient's age.   The gray white matter differentiation is preserved.   No acute intracranial hemorrhage is seen. No intra-axial or extra-axial fluid collection is seen.   The visualized paranasal sinuses and mastoid air cells are clear.       No CT evidence for acute  intracranial pathology.   Signed by: Aramis Durand 11/13/2024 3:45 PM Dictation workstation:   [id number]       Assessment and Plan   Christiano Cox is a 74 y.o. male with PMHx of COPD (on 1-2L NC at baseline), DVT, PE, BPH, chronic back pain, and osteoarthritis admitted on 12/8/2024 for further work up and management of UTI and bilateral lower extremity swelling and erythema concerning for possible cellulitis vs. Venous insufficiency. Patient also noted to have REYNOLD with concern for urinary retention.     ACUTE MEDICAL ISSUES:  #Bilateral lower extremity edema and erythema  #?B/L lower extremity cellulitis  #?Venous insufficiency  #Neuropathy  ::Patient notes bilateral lower extremity edema and erythema with warmth for past 3 weeks  ::Concerning for possible cellulitis but less likely as bilateral in nature  ::B/L LE duplex negative at [place]  ::No leukocytosis  PLAN:  -Vancomycin and CFTX to cover possible cellulitis  -ID consulted  -MRSA swab positive  -Wound care consulted: Adaptic. ABD, ace, elevation  BLE.   -Blood cultures ordered and pending  -Erythema edges marked with skin marker, monitor   -Bilateral lower extremity GENARO ordered indicated no evidence of arterial occlusive disease B/L  -TTE ordered to assess cardiac function for possible cardiogenic cause of edema (prior TTE 10/2023 with LVEF 60-65%)  -HbA1c ordered in setting of neuropathy, 6.2     #UTI  ::UA positive with urinary symptoms  ::Hx of mult-drug resistant morganella-morganii  PLAN:  -Start ceftriaxone - previous MDRO susceptible   -Urine culture pending, follow   -Blood cultures pending     #REYNOLD   #BPH with ?acute urinary retention  ::Creatinine on admission 1.39, baseline 0.8-1.1  ::Possibly pre-renal/intrinsic in the setting of acute infection vs rhabdomyolysis vs post renal in the setting of acute urinary retention/obstruction  PLAN:  -Urine electrolytes ordered and pending  -CK wnl  -Continue home 0.8 mg daily Flomax  -Renally dose  medications and avoid nephrotoxic agents  -Bladder scan indicated 12 mL     #Left gluteal hematoma s/p fall 11/13  #Bilateral lower extremity weakness  -Hematoma stable on CT scan from prior MRI  -Cyclobenzaprine 5 mg TID PRN for muscle spasms  -Pain regimen:              Tylenol 975 mg q 8 hours PRN for mild pain              Oxycodone 5 mg q6 hours PRN for moderate pain              Oxycodone 10 mg q6 hours PRN for severe and breakthrough pain     #Hepatic steatosis  #Hepatomegaly  ::Noted on CT abdomen  ::Likely 2/2 alcohol vs. CORCORAN  -Follow up outpatient     CHRONIC MEDICAL ISSUES:  #COPD  -Continue home 1L NC O2 when at rest and 2L NC O2 on exertion  -Continue home Spiriva  -Continue home duo nebs QID PRN     #DM  -CPAP ordered, continue nightly      #HLD  -Continue home 10 mg daily rosuvastatin     Fluids: PO  Electrolytes: Replete PRN  Nutrition: Cardiac diet  Antimicrobials: Vancomycin and CFTX  DVT ppx: Lovenox  GI ppx: None  Catheter: None  Lines: PIV  Supplemental Oxygen: 1-2L NC (Keep O2 88-92%)  Emergency Contact: Extended Emergency Contact Information  Primary Emergency Contact: Rhiannon Cox  Home Phone: [phone]  Mobile Phone: [phone]  Relation: Spouse   Code: Full Code      Disposition: 74 year old male admitted for further work up and management of UTI with REYNOLD and bilateral lower extremity edema and erythema. ELOS > 2 midnights.  Thomas Zamorano DO  Internal Medicine, PGY- 1  12/09/24 at 4:18 PM

## 2024-12-09 NOTE — PROGRESS NOTES
Physical Therapy    Physical Therapy Evaluation    Patient Name: Christiano Cox  MRN: [id number]  Department: [address]  [facility]  Today's Date: 12/9/2024   Time Calculation  Start Time: 1033  Stop Time: 1051  Time Calculation (min): 18 min    Assessment/Plan   PT Assessment  PT Assessment Results: Decreased endurance, Impaired balance, Decreased mobility  Rehab Prognosis: Excellent  Barriers to Discharge: Medical Management  Evaluation/Treatment Tolerance: Patient tolerated treatment well  Medical Staff Made Aware: Yes  Strengths: Ability to acquire knowledge, Support of Caregivers, Housing layout  Barriers to Participation: Comorbidities  End of Session Communication: Bedside nurse  Assessment Comment: Patient pleasant, cooperative, reports he has been using a standard walker since previous accident in the beginning of November-fell off a 6 foot ladder, MRI reveleaed multiple muscle strains of abd, gluts, abd/add. Patient educated on the use of 2WW to improve overall mobility and fluidity-patient plans to speak to spouse. Rec outpatient PT, no acute skilled PT necessary.  End of Session Patient Position: Bed, 3 rail up, Alarm on (Patient requested to return to bed)  IP OR SWING BED PT PLAN  Inpatient or Swing Bed: Inpatient  PT Plan  Treatment/Interventions: Balance training, Strengthening, Endurance training  PT Plan: PT Eval only  PT Eval Only Reason: No acute PT needs identified  PT Frequency: PT eval only  PT Discharge Recommendations: Low intensity level of continued care (OUTPATIENT)  Equipment Recommended upon Discharge: Wheeled walker  PT Recommended Transfer Status: Assist x1  PT - OK to Discharge: Yes (per PT POC)    Subjective   General Visit Information:  General  Reason for Referral: Impaired functional mobility; cellulitis  Referred By: Kishan Restrepo  Past Medical History Relevant to Rehab: COPD (on 1-2L NC at baseline), DVT, PE, BPH, chronic back pain, and osteoarthritis who presented to   [facility] as a transfer from [place] for further management of UTI and lower extremity edema  Family/Caregiver Present: Yes  Prior to Session Communication: Bedside nurse  Patient Position Received: Bed, 3 rail up, Alarm on  General Comment: Patient pleasant, cooperative and agreeable to PT eval  Home Living:  Home Living  Type of Home: House  Lives With: Spouse  Home Adaptive Equipment: Walker rolling or standard  Home Layout: One level  Home Access: Stairs to enter with rails  Entrance Stairs-Rails: Left  Entrance Stairs-Number of Steps: 5  Bathroom Shower/Tub: Tub/shower unit  Bathroom Equipment: Grab bars in shower  Prior Level of Function:  Prior Function Per Pt/Caregiver Report  Level of [place]: Independent with ADLs and functional transfers, Independent with homemaking with ambulation  Receives Help From: Family  ADL Assistance: Independent (wife assists lower dressing)  Homemaking Assistance: Independent  Ambulatory Assistance: Independent (SW)  Precautions:  Precautions  Medical Precautions: Fall precautions (2L O2)     Vital Signs (Past 2hrs)        Date/Time Vitals Session Patient Position Pulse Resp SpO2 BP MAP (mmHg)    12/09/24 1059 --  --  77  22  89 %  116/57  77     12/09/24 1105 --  --  --  --  93 %  --  --                         Objective   Pain:  Pain Assessment  Pain Assessment: 0-10  0-10 (Numeric) Pain Score: 0 - No pain  Cognition:  Cognition  Overall Cognitive Status: Within Functional Limits  Orientation Level: Oriented X4    General Assessments:  General Observation  General Observation: Patient incontinent of urine: min A doffing pants in sitting, min A donning pants in sitting, patient completes independently in standing. Bed linens changed.     Activity Tolerance  Endurance: Tolerates 10 - 20 min exercise with multiple rests    Sensation  Light Touch: No apparent deficits    Strength  Strength Comments: B LE 4+/5    Coordination  Movements are Fluid and Coordinated:  Yes    Postural Control  Postural Control: Within Functional Limits    Static Sitting Balance  Static Sitting-Balance Support: No upper extremity supported, Feet supported  Static Sitting-Level of Assistance: Independent  Dynamic Sitting Balance  Dynamic Sitting-Balance Support: No upper extremity supported, Feet supported  Dynamic Sitting-Level of Assistance: Independent    Static Standing Balance  Static Standing-Balance Support: Bilateral upper extremity supported  Static Standing-Level of Assistance: Modified independent  Dynamic Standing Balance  Dynamic Standing-Balance Support: Bilateral upper extremity supported  Dynamic Standing-Level of Assistance: Distant supervision  Dynamic Standing-Balance: Turning  Functional Assessments:       Bed Mobility  Bed Mobility: Yes  Bed Mobility 1  Bed Mobility 1: Supine to sitting, Sitting to supine  Level of Assistance 1: Independent    Transfers  Transfer: Yes  Transfer 1  Transfer From 1: Sit to, Stand to  Transfer to 1: Sit, Stand  Technique 1: Sit to stand, Stand to sit  Transfer Device 1: Walker  Transfer Level of Assistance 1: Close supervision    Ambulation/Gait Training  Ambulation/Gait Training Performed: Yes  Ambulation/Gait Training 1  Surface 1: Level tile  Device 1: Rolling walker  Assistance 1: Close supervision  Quality of Gait 1:  (steady gait)  Comments/Distance (ft) 1: 15' x 2    Outcome Measures:  [facility] Basic Mobility  Turning from your back to your side while in a flat bed without using bedrails: None  Moving from lying on your back to sitting on the side of a flat bed without using bedrails: None  Moving to and from bed to chair (including a wheelchair): None  Standing up from a chair using your arms (e.g. wheelchair or bedside chair): None  To walk in hospital room: A little  Climbing 3-5 steps with railing: A little  Basic Mobility - Total Score: 22    Education Documentation  Precautions, taught by Nithya Weaver, PT at 12/9/2024 11:45  AM.  Learner: Patient  Readiness: Acceptance  Method: Explanation  Response: Verbalizes Understanding    Body Mechanics, taught by Nithya Weaver, PT at 12/9/2024 11:45 AM.  Learner: Patient  Readiness: Acceptance  Method: Explanation  Response: Verbalizes Understanding    Mobility Training, taught by Nithya Weaver, PT at 12/9/2024 11:45 AM.  Learner: Patient  Readiness: Acceptance  Method: Explanation  Response: Verbalizes Understanding    Education Comments  No comments found.

## 2024-12-09 NOTE — PROGRESS NOTES
12/09/24 0825   Discharge Planning   Living Arrangements Spouse/significant other  (Home with spouse Rhiannon)   Support Systems Spouse/significant other   Assistance Needed A&OX4; independent with ADLs with walker as of recent; drives; 2L NC baseline ([facility] [place] [facility]) 2L NC Currently; [facility]   Type of Residence Private residence   Number of Stairs to Enter Residence 4   Number of Stairs Within Residence 0   Do you have animals or pets at home? Yes   Type of Animals or Pets 2 dogs   Who is requesting discharge planning? Provider   Expected Discharge Disposition Home  (Pending workup but likely home no needs)   Does the patient need discharge transport arranged? No   Financial Resource Strain   How hard is it for you to pay for the very basics like food, housing, medical care, and heating? Not hard   Housing Stability   In the last 12 months, was there a time when you were not able to pay the mortgage or rent on time? N   In the past 12 months, how many times have you moved where you were living? 0   At any time in the past 12 months, were you homeless or living in a shelter (including now)? N   Transportation Needs   In the past 12 months, has lack of transportation kept you from medical appointments or from getting medications? no   In the past 12 months, has lack of transportation kept you from meetings, work, or from getting things needed for daily living? No

## 2024-12-09 NOTE — H&P
Internal Medicine - History and Physical Note      Patient: Christiano Cox, Age: 74 y.o., SEX: male , MRN:[id number], ROOM:128/[facility],  Code: Full Code   Admitted On: 12/8/2024   Admitting Dx: Cellulitis [L03.90]  PCP: STEFAN Simmons-CNP        Attending: Kishan Restrepo DO        Chief Complaint   Patient: Christiano Cox is a 74 y.o. male who presented to the hospital for urinary retention and lower extremity redness and swelling      HPI   Christiano Cox is a 74 y.o. year old male  patient with PMHx of COPD (on 1-2L NC at baseline), DVT, PE, BPH, chronic back pain, and osteoarthritis who presented to [facility] as a transfer from [place] for further management of UTI and lower extremity edema. Notably, patient was recently admitted to [place] 11/3-11/16 for management after a 6-foot fall from a ladder with multiple muscle and tendon tears with gluteal hematoma formation, COPD exacerbation, and UTI.  Patient states that since discharge from this admission he has had progressive swelling in his bilateral lower extremities with increasing redness which has spread up his legs progressively towards his knees.  He also endorses chronic swelling of his bilateral feet with neuropathy which has worsened in the last 3 weeks with other symptoms.  States that at after discharge from [place], he was having a hard time getting around at home and has been using a walker.  He denies fevers and chills.  He does also endorse urinary symptoms including dysuria, foul-smelling urine, frequent urination with oliguria for the past 4 to 5 days.  Endorses suprapubic pain that extends towards bilateral flanks. During recent admission at [place], patient was noted to have UTI and culture was growing MDRO morganella morganii - he was treated with a 7-day course of antibiotics for this using ceftriaxone and cefuroxime based on sensitivity results.  He denies chest pain, palpitations, GI symptoms.    ROS  Review of  Systems   Constitutional:  Negative for appetite change, chills and fever.   Respiratory:  Negative for shortness of breath.    Cardiovascular:  Positive for leg swelling. Negative for chest pain and palpitations.   Gastrointestinal:  Negative for abdominal pain, constipation, diarrhea, nausea and vomiting.   Genitourinary:  Positive for decreased urine volume, difficulty urinating, dysuria, flank pain, scrotal swelling and urgency.   Musculoskeletal:  Positive for arthralgias, back pain and gait problem.        12 Point ROS negative unless otherwise specified above.     ED COURSE:   VS: Temperature 36.8, /50, heart rate 81, respiration 18, O2 sat 93% on 1 L nasal cannula    Labs  - CBC: No leukocytosis, hemoglobin 13.2, hematocrit 41.2, platelets 242  - BMP: Glucose 100, creatinine 1.39, GFR 53  - LFTs: All within normal limits  - Lactate: 1.6  - D-dimer: 1498  - PT/INR: 13.8/1.2  - Troponin: 10, 8  - UA positive for: Appearance turbid, 1+ protein, 1+ urobilinogen, 2+ nitrite, 500 leuk esterase, 3+ bacteria, > 50 WBC  - Blood cultures: Collected and pending  - Urine cultures: Pending    Imaging:  Bilateral lower extremity duplex ultrasound  Negative study.  No deep venous thrombosis of the  bilateral lower extremity.    CT chest abdomen and pelvis with contrast  CHEST  1.  No acute abnormality in the chest. Aortic valve and coronary artery atherosclerotic calcifications are present.  ABDOMEN - PELVIS  1.  No acute abnormality in the abdomen/pelvis. Partially visualized thickening of the left gluteus tomas muscle  related to muscular hematoma described in greater detail on recent MRI pelvis.  2. Significant prostatomegaly. Right-sided renal cysts similar to prior incompletely characterized, likely representing proteinaceous  cysts. Hepatomegaly and diffuse hepatic steatosis.    Interventions:   Zofran 4 mg  Vancomycin and Zosyn  500 cc bolus normal saline    Past Medical History: As per HPI  Past Surgical  History: Bilateral cataract surgery  Allergies: No known drug allergies  Family History: Noncontributory  Home Meds: Albuterol every 6 hours as needed, DuoNebs 4 times daily, rosuvastatin 10 mg daily, Flomax 0.8 mg daily, Spiriva 2 puffs daily    Social History:  - Coming from home with wife  - Tobacco: Former, patient states he quit about 1 year ago-65-year smoking history prior with 126-pack-year smoking history  - Alcohol: Former  - Illicit Drug: Denies    HealthCare Providers:  - PCP: STEFAN Simmons-CNP     Code Status: Full Code     Emergency contact: Extended Emergency Contact Information  Primary Emergency Contact: Rhiannon Cox  [place] Phone: [phone]  Mobile Phone: [phone]  Relation: Spouse     Past Medical History     Past Medical History:   Diagnosis Date    Acute pulmonary embolism with acute cor pulmonale (Multi) 10/19/2023    COPD (chronic obstructive pulmonary disease) (Multi)     DVT (deep venous thrombosis) (Multi)     Elevated troponin 10/14/2023    History of pulmonary embolism 12/09/2024    Leukocytosis 10/14/2023    Myocardial injury 10/19/2023    Respiratory acidosis 10/15/2023    Retention of urine, unspecified 12/09/2024    Shingles 12/09/2024    Tobacco use disorder 10/28/2010    Oct 28, 2010 Entered By: CARLO WOODWARD Comment: 2ppd x 40+ years; quit twice in past    Apr 08, 2021 Entered By: CARLO WOODWARD Comment: 4/21 still smoking          Surgical History     Past Surgical History:   Procedure Laterality Date    CATARACT EXTRACTION, BILATERAL Bilateral        Family History   No family history on file.    Social History     Social History     Socioeconomic History    Marital status:      Spouse name: Not on file    Number of children: Not on file    Years of education: Not on file    Highest education level: Not on file   Occupational History    Not on file   Tobacco Use    Smoking status: Former     Current packs/day: 0.00     Average packs/day: 2.0 packs/day  for 63.1 years (126.3 ttl pk-yrs)     Types: Cigarettes     Start date: 1959     Quit date:      Years since quittin.9    Smokeless tobacco: Never   Vaping Use    Vaping status: Never Used   Substance and Sexual Activity    Alcohol use: Not Currently    Drug use: Never    Sexual activity: Defer   Other Topics Concern    Not on file   Social History Narrative    Not on file     Social Drivers of Health     Financial Resource Strain: Low Risk  (2024)    Overall Financial Resource Strain (CARDIA)     Difficulty of Paying Living Expenses: Not very hard   Food Insecurity: No Food Insecurity (2024)    Hunger Vital Sign     Worried About Running Out of Food in the Last Year: Never true     Ran Out of Food in the Last Year: Never true   Transportation Needs: No Transportation Needs (2024)    PRAPARE - Transportation     Lack of Transportation (Medical): No     Lack of Transportation (Non-Medical): No   Physical Activity: Not on file   Stress: No Stress Concern Present (2024)    [employer] [place] [employer] - Occupational Stress Questionnaire     Feeling of Stress : Not at all   Social Connections: Low Risk (10/20/2023)    Received from [facility] ([facility]    Social Connections     How often do you feel isolated from others?: Hardly ever   Intimate Partner Violence: Not At Risk (2024)    Humiliation, Afraid, Rape, and Kick questionnaire     Fear of Current or Ex-Partner: No     Emotionally Abused: No     Physically Abused: No     Sexually Abused: No   Housing Stability: Low Risk  (2024)    Housing Stability Vital Sign     Unable to Pay for Housing in the Last Year: No     Number of Times Moved in the Last Year: 1     Homeless in the Last Year: No       Tobacco Use: Medium Risk (2024)    Patient History     Smoking Tobacco Use: Former     Smokeless Tobacco Use: Never     Passive Exposure: Not on file        Social History     Substance and Sexual Activity  "  Alcohol Use Not Currently        Allergies   No Known Allergies       Meds    Scheduled medications  cefTRIAXone, 1 g, intravenous, q24h  enoxaparin, 40 mg, subcutaneous, q24h  oxygen, , inhalation, Continuous - Inhalation  rosuvastatin, 10 mg, oral, q PM  tamsulosin, 0.8 mg, oral, Daily  tiotropium, 2 puff, inhalation, Daily      Continuous medications     PRN medications  PRN medications: acetaminophen **OR** acetaminophen **OR** acetaminophen, ipratropium-albuteroL, oxyCODONE, oxyCODONE, polyethylene glycol, vancomycin     Objective    Physical Exam  Constitutional:       General: He is not in acute distress.     Appearance: He is obese.   HENT:      Head: Normocephalic and atraumatic.      Nose:      Comments: NC in place  Cardiovascular:      Rate and Rhythm: Normal rate and regular rhythm.   Pulmonary:      Effort: Pulmonary effort is normal. No respiratory distress.      Breath sounds: Rhonchi present. No wheezing.      Comments: Some crackles heard in L lower lung base  Abdominal:      General: There is distension.      Tenderness: There is no abdominal tenderness. There is right CVA tenderness and left CVA tenderness.   Musculoskeletal:         General: Swelling present.      Right lower leg: Edema present.      Left lower leg: Edema present.      Comments: Swelling and erythema in bilateral lower extremities from food to mid-shin - skin marker used to note where erythema ends   Skin:     Findings: Erythema and lesion (Tibial wounds on L lower extremity) present.   Neurological:      General: No focal deficit present.      Mental Status: He is alert.   Psychiatric:         Mood and Affect: Mood normal.         Behavior: Behavior normal.        Visit Vitals  /58 (BP Location: Right arm, Patient Position: Sitting)   Pulse 81   Temp 36.8 °C (98.2 °F) (Temporal)   Resp 18   Ht 1.829 m (6' 0.01\")   Wt 127 kg (279 lb 15.8 oz)   SpO2 93%   BMI 37.96 kg/m²   Smoking Status Former   BSA 2.54 m²        No " "intake or output data in the 24 hours ending 12/09/24 0117          Labs:   Results from last 72 hours   Lab Units 12/08/24  1854   SODIUM mmol/L 136   POTASSIUM mmol/L 3.9   CHLORIDE mmol/L 100   CO2 mmol/L 27   BUN mg/dL 13   CREATININE mg/dL 1.39*   GLUCOSE mg/dL 100*   CALCIUM mg/dL 8.9   ANION GAP mmol/L 13   EGFR mL/min/1.73m*2 53*      Results from last 72 hours   Lab Units 12/08/24  1854   WBC AUTO x10*3/uL 6.6   HEMOGLOBIN g/dL 13.2*   HEMATOCRIT % 41.2   PLATELETS AUTO x10*3/uL 242   NEUTROS PCT AUTO % 63.4   LYMPHS PCT AUTO % 22.4   MONOS PCT AUTO % 9.7   EOS PCT AUTO % 3.8      Lab Results   Component Value Date    CALCIUM 8.9 12/08/2024      No results found for: \"CRP\"    [unfilled]     Micro/ID:   Susceptibility data from last 90 days.  Collected Specimen Info Organism Amoxicillin/Clavulanate Ampicillin Ampicillin/Sulbactam Cefazolin Cefotaxime Ceftriaxone Ciprofloxacin Gentamicin Imipenem Nitrofurantoin Piperacillin/Tazobactam Trimethoprim/Sulfamethoxazole   11/13/24 Urine from Clean Catch/Voided Morganella morganii  R  R  R  R  R  S  S  S  R  R  S  S     Lab Results   Component Value Date    URINECULTURE 20,000 - 80,000 Morganella morganii (A) 11/13/2024                    No lab exists for component: \"AGALPCRNB\"       Images    CT chest abdomen pelvis w IV contrast  Narrative: Interpreted By:  Rory Gudino,   STUDY:  CT CHEST ABDOMEN PELVIS W IV CONTRAST;  12/8/2024 8:20 pm      INDICATION:  Signs/Symptoms:increased sob , pelvis and LE edema , diff urination      COMPARISON:  CT chest abdomen pelvis 11/13/2024.      ACCESSION NUMBER(S):  [id number]      ORDERING CLINICIAN:  CHRISTIANE GARCIA      TECHNIQUE:  CT of the chest, abdomen, and pelvis was performed.  Contiguous axial images were obtained through the chest, abdomen and  pelvis. Coronal and sagittal reconstructions were performed.      The images were obtained in the portal venous phase.      75 ml of contrast material Omnipaque 350 were " administered  intravenously without immediate complication.      FINDINGS:  CHEST:      LUNG/PLEURA/LARGE AIRWAYS:  There are no pleural effusions. There is no evidence of pneumothorax.  There are no consolidations. The tracheobronchial tree is patent.  Mild subsegmental atelectasis in the anterior lingula and anterior  right middle lobe, unchanged. Mild upper lobe predominant  centrilobular emphysema.      VESSELS:  Thoracic aorta is ectatic measuring up to 4.0 cm at the pulmonary  trunk. Aortic valve atherosclerotic calcifications are present. There  are moderate coronary artery atherosclerotic calcifications.      Main pulmonary artery and its branches are normal in caliber.      HEART:  The heart is normal in size.   There is no pericardial effusion.      MEDIASTINUM AND GRAEME:  No pneumomediastinum, abnormal mediastinal fluid collection or  mediastinal hematoma is appreciated. No mediastinal, hilar or  axillary adenopathy is present.  The esophagus is grossly normal.      CHEST WALL AND LOWER NECK:  No acute fracture or dislocation of the included osseous structures  are appreciated.  No suspicious osseous lesions are identified.  The  thoracic wall soft tissues are within normal limits.      ABDOMEN:      LIVER:  The liver is enlarged and normal in contour. Relative hypodensity of  the liver suggestive of diffuse hepatic steatosis.      GALLBLADDER:  The gallbladder is contracted, without evidence of radiopaque stone.      BILE DUCTS:  The intahepatic and extrahepatic bile ducts are not dilated.      PANCREAS:  The pancreas appears unremarkable.      SPLEEN:  No parenchymal perfusion deficit of the spleen is appreciated to  suggest contusion or laceration. There is no subcapsular hematoma, no  perisplenic fluid collection.      ADRENAL GLANDS:  The bilateral adrenal glands are unremarkable in appearance.      KIDNEYS AND URETERS:  There are unchanged hypodense exophytic lesions arising from the  right midpole  and lower pole kidney likely representing proteinaceous  cyst but incompletely characterized.. The kidneys are normal in size.  There is no hydronephrosis. There is no nephrolithiasis.      PELVIS:      BLADDER:  The urinary bladder is grossly normal.      REPRODUCTIVE ORGANS:  Prostate is enlarged measuring 5.6 cm transversely.      BOWEL:  The stomach is unremarkable.  The small bowel is normal in caliber  without evidence of focal wall thickening or obstruction.  There is  no evidence of focal wall thickening or dilatation of the large  bowel.  Appendix is normal.      VESSELS:  The aorta and IVC are normal in caliber. There are moderate  atherosclerotic plaque of the aorta. The principal vasculature of the  abdomen and pelvis is patent.      PERITONEUM/RETROPERITONEUM/LYMPH NODES:  There is no evidence of intra- or retroperitoneal hematoma.  There is  no free or loculated fluid collection, no free intraperitoneal air.  No abdominopelvic lymphadenopathy is present.      BONES AND ABDOMINAL WALL:  No evidence of acute fracture or dislocation of the included osseous  structures.  No suspicious osseous lesions are identified. Unchanged  chronic compression deformities of T11 and T12 vertebral bodies.  Small bilateral fat containing inguinal hernias. Partially visualized  thickening of the left gluteus tomas muscle.      Impression: CHEST  1.  No acute abnormality in the chest. Aortic valve and coronary  artery atherosclerotic calcifications are present.      ABDOMEN - PELVIS  1.  No acute abnormality in the abdomen/pelvis. Partially visualized  thickening of the left gluteus tomas muscle  related to muscular  hematoma described in greater detail on recent MRI pelvis.  2. Significant prostatomegaly. Right-sided renal cysts similar to  prior incompletely characterized, likely representing proteinaceous  cysts. Hepatomegaly and diffuse hepatic steatosis.          Signed by: Rory Gudino 12/8/2024 8:54  PM  Dictation workstation:   [id number]  Vascular US Lower Extremity Venous Duplex Bilateral  Narrative: Interpreted By:  Christiano Larry,   STUDY:  [facility] US LOWER EXTREMITY VENOUS DUPLEX BILATERAL  12/8/2024 8:06 pm      INDICATION:  73 y/o   M with  Signs/Symptoms:swelling. LMP:  Unknown.              COMPARISON:  None.      ACCESSION NUMBER(S):  [id number]      ORDERING CLINICIAN:  CHRISTIANE GARCIA      TECHNIQUE:  Routine ultrasound of the  bilateral lower extremity was performed  with duplex Doppler (color and spectral) evaluation.   Static images  were obtained for remote interpretation.      FINDINGS:  THIGH VEINS:  The common femoral, femoral, popliteal, proximal medial  saphenous, and deep femoral veins are patent and free of thrombus.  The veins are normally compressible.  They demonstrate normal phasic  flow and augmentation response.      CALF VEINS:  The paired peroneal and posterior tibial calf veins are  patent.      Impression: Negative study.  No deep venous thrombosis of the  bilateral lower  extremity.      MACRO:  None      Signed by: Christiano Larry 12/8/2024 8:25 PM  Dictation workstation:   [id number]       Encounter Date: 11/13/24   ECG 12 lead   Result Value    Ventricular Rate 94    Atrial Rate 94    TN Interval 176    QRS Duration 100    QT Interval 350    QTC Calculation(Bazett) 437    P Axis 59    R Axis 3    T Axis 36    QRS Count 16    Q Onset 209    P Onset 121    P Offset 175    T Offset 384    QTC Fredericia 406    Narrative    Sinus rhythm with occasional Premature ventricular complexes  Possible Anterolateral infarct (cited on or before 15-AUG-2021)  Abnormal ECG  When compared with ECG of 13-NOV-2024 14:12,  Premature ventricular complexes are now Present  QRS axis Shifted left  See ED provider note for full interpretation and clinical correlation  Confirmed by Alex Paz ([id number]) on 11/15/2024 3:12:11 PM      Encounter Date: 11/13/24   ECG 12 lead   Result Value     Ventricular Rate 94    Atrial Rate 94    MD Interval 176    QRS Duration 100    QT Interval 350    QTC Calculation(Bazett) 437    P Axis 59    R Axis 3    T Axis 36    QRS Count 16    Q Onset 209    P Onset 121    P Offset 175    T Offset 384    QTC Fredericia 406    Narrative    Sinus rhythm with occasional Premature ventricular complexes  Possible Anterolateral infarct (cited on or before 15-AUG-2021)  Abnormal ECG  When compared with ECG of 13-NOV-2024 14:12,  Premature ventricular complexes are now Present  QRS axis Shifted left  See ED provider note for full interpretation and clinical correlation  Confirmed by Alex Paz ([id number]) on 11/15/2024 3:12:11 PM        [unfilled]     [unfilled]     Assessment and Plan    Christiano Cox is a 74 y.o. male with PMHx of COPD (on 1-2L NC at baseline), DVT, PE, BPH, chronic back pain, and osteoarthritis admitted on 12/8/2024 for further work up and management of UTI and bilateral lower extremity swelling and erythema concerning for possible cellulitis vs. Venous insufficiency. Patient also noted to have REYNOLD with concern for urinary retention.    ACUTE MEDICAL ISSUES:  #Bilateral lower extremity edema and erythema  #?B/L lower extremity cellulitis  #?Venous insufficiency  #Neuropathy  ::Patient notes bilateral lower extremity edema and erythema with warmth for past 3 weeks  ::Concerning for possible cellulitis but less likely as bilateral in nature  ::B/L LE duplex negative at [place]  ::No leukocytosis  PLAN:  -Vancomycin and CFTX to cover possible cellulitis  -MRSA swab pending  -Wound care consulted  -Blood cultures ordered and pending  -Erythema edges marked with skin marker, monitor   -Bilateral lower extremity venous insufficiency ultrasound ordered  -TTE ordered to assess cardiac function for possible cardiogenic cause of edema (prior TTE 10/2023 with LVEF 60-65%)  -HbA1c ordered in setting of neuropathy    #UTI  ::UA positive with urinary symptoms  ::Hx of mult-drug  resistant morganella-morganii  PLAN:  -Start ceftriaxone - previous MDRO susceptible   -Urine culture pending, follow   -Blood cultures pending    #REYNOLD   #BPH with ?acute urinary retention  ::Creatinine on admission 1.39, baseline 0.8-1.1  ::Possibly pre-renal/intrinsic in the setting of acute infection vs rhabdomyolysis vs post renal in the setting of acute urinary retention/obstruction  PLAN:  -Urine electrolytes ordered and pending  -CK ordered to rule out rhabdomyolysis in setting of recent fall and large hematoma  -Continue home 0.8 mg daily Flomax  -Renally dose medications and avoid nephrotoxic agents  -Post-void residual bladder scan ordered to determine if patient is retaining     #Left gluteal hematoma s/p fall 11/13  #Bilateral lower extremity weakness  -Hematoma stable on CT scan from prior MRI  -Cyclobenzaprine 5 mg TID PRN for muscle spasms  -Pain regimen:   Tylenol 975 mg q 8 hours PRN for mild pain   Oxycodone 5 mg q6 hours PRN for moderate pain   Oxycodone 10 mg q6 hours PRN for severe and breakthrough pain    #Hepatic steatosis  #Hepatomegaly  ::Noted on CT abdomen  ::Likely 2/2 alcohol vs. CORCORAN  -Follow up outpatient    CHRONIC MEDICAL ISSUES:  #COPD  -Continue home 1L NC O2 when at rest and 2L NC O2 on exertion  -Continue home Spiriva  -Continue home duo nebs QID PRN    #DM  -CPAP ordered, continue nightly     #HLD  -Continue home 10 mg daily rosuvastatin    Fluids: PO  Electrolytes: Replete PRN  Nutrition: Cardiac diet  Antimicrobials: Vancomycin and CFTX  DVT ppx: Lovenox  GI ppx: None  Catheter: None  Lines: PIV  Supplemental Oxygen: 1-2L NC (Keep O2 88-92%)  Emergency Contact: Extended Emergency Contact Information  Primary Emergency Contact: Rhiannon Cox  Home Phone: [phone]  Mobile Phone: [phone]  Relation: Spouse   Code: Full Code     Disposition: 74 year old male admitted for further work up and management of UTI with REYNOLD and bilateral lower extremity edema and erythema. TAHIR  > 2 midnights.    Shelli Gregg MD  Internal Medicine, PGY- 1  12/09/24 at 1:17 AM

## 2024-12-10 ENCOUNTER — PHARMACY VISIT (OUTPATIENT)
Dept: PHARMACY | Facility: CLINIC | Age: 74
End: 2024-12-10
Payer: COMMERCIAL

## 2024-12-10 ENCOUNTER — APPOINTMENT (OUTPATIENT)
Dept: CARDIOLOGY | Facility: HOSPITAL | Age: 74
DRG: 303 | End: 2024-12-10
Payer: OTHER GOVERNMENT

## 2024-12-10 VITALS
TEMPERATURE: 98.6 F | OXYGEN SATURATION: 94 % | WEIGHT: 279.98 LBS | BODY MASS INDEX: 37.92 KG/M2 | HEIGHT: 72 IN | RESPIRATION RATE: 19 BRPM | DIASTOLIC BLOOD PRESSURE: 61 MMHG | HEART RATE: 76 BPM | SYSTOLIC BLOOD PRESSURE: 128 MMHG

## 2024-12-10 PROBLEM — L03.90 CELLULITIS: Status: RESOLVED | Noted: 2024-12-08 | Resolved: 2024-12-10

## 2024-12-10 PROBLEM — I87.2 VENOUS STASIS DERMATITIS OF BOTH LOWER EXTREMITIES: Status: ACTIVE | Noted: 2024-12-10

## 2024-12-10 LAB
ALBUMIN SERPL BCP-MCNC: 3.4 G/DL (ref 3.4–5)
ANION GAP SERPL CALC-SCNC: 14 MMOL/L (ref 10–20)
AORTIC VALVE MEAN GRADIENT: 9 MMHG
AORTIC VALVE PEAK VELOCITY: 2.18 M/S
AV PEAK GRADIENT: 19 MMHG
AVA (PEAK VEL): 1.89 CM2
AVA (VTI): 2.06 CM2
BACTERIA UR CULT: NO GROWTH
BUN SERPL-MCNC: 11 MG/DL (ref 6–23)
CALCIUM SERPL-MCNC: 9.2 MG/DL (ref 8.6–10.3)
CHLORIDE SERPL-SCNC: 100 MMOL/L (ref 98–107)
CO2 SERPL-SCNC: 29 MMOL/L (ref 21–32)
CREAT SERPL-MCNC: 0.94 MG/DL (ref 0.5–1.3)
EGFRCR SERPLBLD CKD-EPI 2021: 85 ML/MIN/1.73M*2
EJECTION FRACTION APICAL 4 CHAMBER: 51.6
EJECTION FRACTION: 58 %
ERYTHROCYTE [DISTWIDTH] IN BLOOD BY AUTOMATED COUNT: 12.2 % (ref 11.5–14.5)
GLUCOSE SERPL-MCNC: 105 MG/DL (ref 74–99)
HCT VFR BLD AUTO: 40.3 % (ref 41–52)
HGB BLD-MCNC: 12.7 G/DL (ref 13.5–17.5)
LEFT ATRIUM VOLUME AREA LENGTH INDEX BSA: 17.3 ML/M2
LEFT VENTRICLE INTERNAL DIMENSION DIASTOLE: 5.13 CM (ref 3.5–6)
LEFT VENTRICULAR OUTFLOW TRACT DIAMETER: 2.23 CM
MAGNESIUM SERPL-MCNC: 1.65 MG/DL (ref 1.6–2.4)
MCH RBC QN AUTO: 30.9 PG (ref 26–34)
MCHC RBC AUTO-ENTMCNC: 31.5 G/DL (ref 32–36)
MCV RBC AUTO: 98 FL (ref 80–100)
MITRAL VALVE E/A RATIO: 0.99
NRBC BLD-RTO: 0 /100 WBCS (ref 0–0)
PHOSPHATE SERPL-MCNC: 3.7 MG/DL (ref 2.5–4.9)
PLATELET # BLD AUTO: 252 X10*3/UL (ref 150–450)
POTASSIUM SERPL-SCNC: 3.8 MMOL/L (ref 3.5–5.3)
RBC # BLD AUTO: 4.11 X10*6/UL (ref 4.5–5.9)
RIGHT VENTRICLE FREE WALL PEAK S': 12 CM/S
RIGHT VENTRICLE PEAK SYSTOLIC PRESSURE: 24.4 MMHG
SODIUM SERPL-SCNC: 139 MMOL/L (ref 136–145)
TRICUSPID ANNULAR PLANE SYSTOLIC EXCURSION: 1.9 CM
WBC # BLD AUTO: 4.7 X10*3/UL (ref 4.4–11.3)

## 2024-12-10 PROCEDURE — RXMED WILLOW AMBULATORY MEDICATION CHARGE

## 2024-12-10 PROCEDURE — 85027 COMPLETE CBC AUTOMATED: CPT

## 2024-12-10 PROCEDURE — 83735 ASSAY OF MAGNESIUM: CPT

## 2024-12-10 PROCEDURE — 2500000004 HC RX 250 GENERAL PHARMACY W/ HCPCS (ALT 636 FOR OP/ED)

## 2024-12-10 PROCEDURE — 2500000002 HC RX 250 W HCPCS SELF ADMINISTERED DRUGS (ALT 637 FOR MEDICARE OP, ALT 636 FOR OP/ED)

## 2024-12-10 PROCEDURE — 93306 TTE W/DOPPLER COMPLETE: CPT | Performed by: INTERNAL MEDICINE

## 2024-12-10 PROCEDURE — 36415 COLL VENOUS BLD VENIPUNCTURE: CPT

## 2024-12-10 PROCEDURE — 80069 RENAL FUNCTION PANEL: CPT

## 2024-12-10 PROCEDURE — 2500000005 HC RX 250 GENERAL PHARMACY W/O HCPCS

## 2024-12-10 PROCEDURE — 2500000002 HC RX 250 W HCPCS SELF ADMINISTERED DRUGS (ALT 637 FOR MEDICARE OP, ALT 636 FOR OP/ED): Performed by: BEHAVIOR TECHNICIAN

## 2024-12-10 PROCEDURE — 93306 TTE W/DOPPLER COMPLETE: CPT

## 2024-12-10 PROCEDURE — 2500000001 HC RX 250 WO HCPCS SELF ADMINISTERED DRUGS (ALT 637 FOR MEDICARE OP)

## 2024-12-10 PROCEDURE — 2500000004 HC RX 250 GENERAL PHARMACY W/ HCPCS (ALT 636 FOR OP/ED): Performed by: BEHAVIOR TECHNICIAN

## 2024-12-10 PROCEDURE — 99239 HOSP IP/OBS DSCHRG MGMT >30: CPT | Performed by: BEHAVIOR TECHNICIAN

## 2024-12-10 RX ORDER — POTASSIUM CHLORIDE 20 MEQ/1
20 TABLET, EXTENDED RELEASE ORAL ONCE
Status: COMPLETED | OUTPATIENT
Start: 2024-12-10 | End: 2024-12-10

## 2024-12-10 RX ORDER — LANOLIN ALCOHOL/MO/W.PET/CERES
400 CREAM (GRAM) TOPICAL DAILY
Status: DISCONTINUED | OUTPATIENT
Start: 2024-12-10 | End: 2024-12-10 | Stop reason: HOSPADM

## 2024-12-10 RX ORDER — CEPHALEXIN 500 MG/1
500 CAPSULE ORAL 3 TIMES DAILY
Qty: 21 CAPSULE | Refills: 0 | Status: SHIPPED | OUTPATIENT
Start: 2024-12-10 | End: 2024-12-17

## 2024-12-10 ASSESSMENT — COGNITIVE AND FUNCTIONAL STATUS - GENERAL
DAILY ACTIVITIY SCORE: 24
MOBILITY SCORE: 24

## 2024-12-10 ASSESSMENT — PAIN SCALES - GENERAL: PAINLEVEL_OUTOF10: 0 - NO PAIN

## 2024-12-10 NOTE — HOSPITAL COURSE
Brief HPI:  74 y.o. year old male  patient with PMHx of COPD (on 1-2L NC at baseline), DVT, PE, BPH, chronic back pain, and osteoarthritis who presented to [facility] as a transfer from [place] for further management of UTI and lower extremity edema. Notably, patient was recently admitted to [place] 11/3-11/16 for management after a 6-foot fall from a ladder with multiple muscle and tendon tears with gluteal hematoma formation, COPD exacerbation, and UTI.  Patient states that since discharge from this admission he has had progressive swelling in his bilateral lower extremities with increasing redness which has spread up his legs progressively towards his knees. States that after discharge from [place], he was having a hard time getting around at home and has been using a walker.  He does also endorse urinary symptoms including dysuria, foul-smelling urine, frequent urination with oliguria for the past 4 to 5 days. During recent admission at [place], patient was noted to have UTI and culture was growing MDRO morganella morganii - he was treated with a 7-day course of antibiotics for this using ceftriaxone and cefuroxime based on sensitivity results.     Floors:  Admitted and started Rocephin. Infectious Disease was consulted and do not suspect active cellulitis and instead venous stasis b/l. He remained hemodynamically stable throughout his stay, including his baseline oxygen of 2L. No worsening numbness and paresthesia in b/l legs. Wound care was consulted and provided Ace wraps. Patient to be discharged with course of cephalexin and recommending to follow up with wound care outpatient.

## 2024-12-10 NOTE — PROGRESS NOTES
12/10/24 0800   Discharge Planning   Expected Discharge Disposition Home  (Spoke with patient bedside. Pt expressed concerns about treatment and stated 'they're not doing anything for me'. Patient stated he was leaving today regardless after 4pm when wife gets off work. Made patient aware of AMA issues. Made attending aware.)        12/10/24 1400   Discharge Planning   Expected Discharge Disposition Home  (Pt dcing today and is fine with dc. Patient denies home going needs. [facility]. Supplies (ace wraps) given to patient. Spouse to transport home once she gets here)

## 2024-12-10 NOTE — SIGNIFICANT EVENT
Spoke with patient about his CPAP order. Patient states that he only wears his nasal cannula at hs and does not want to wear CPAP. RT explained the benefits of wearing CPAP but patient still declined. RN is aware.

## 2024-12-10 NOTE — PROGRESS NOTES
Christiano Cox is a 74 y.o. male on day 2 of admission presenting with Cellulitis.    Subjective   Interval History: no fever, no new complaints, he wants to go him regardless of the medical recommendations        Review of Systems    Objective   Range of Vitals (last 24 hours)  Heart Rate:  [71-76]   Temp:  [37 °C (98.6 °F)]   Resp:  [19-20]   BP: (117-128)/(61-65)   SpO2:  [94 %]   Daily Weight  12/08/24 : 127 kg (279 lb 15.8 oz)    Body mass index is 37.96 kg/m².    Physical Exam  Constitutional:       Appearance: Normal appearance.   HENT:      Head: Normocephalic and atraumatic.      Mouth/Throat:      Mouth: Mucous membranes are moist.      Pharynx: Oropharynx is clear.   Eyes:      Pupils: Pupils are equal, round, and reactive to light.   Cardiovascular:      Rate and Rhythm: Normal rate and regular rhythm.      Heart sounds: Normal heart sounds.   Pulmonary:      Effort: Pulmonary effort is normal.      Breath sounds: Normal breath sounds.   Abdominal:      General: Abdomen is flat. Bowel sounds are normal.      Palpations: Abdomen is soft.   Musculoskeletal:      Cervical back: Normal range of motion.      Comments: stasis   Neurological:      Mental Status: He is alert.         Antibiotics  cephalexin - 500 mg    Relevant Results  Labs  Results from last 72 hours   Lab Units 12/10/24  0629 12/09/24  0658 12/08/24  1854   WBC AUTO x10*3/uL 4.7 5.7 6.6   HEMOGLOBIN g/dL 12.7* 11.9* 13.2*   HEMATOCRIT % 40.3* 37.0* 41.2   PLATELETS AUTO x10*3/uL 252 208 242   NEUTROS PCT AUTO %  --   --  63.4   LYMPHS PCT AUTO %  --   --  22.4   MONOS PCT AUTO %  --   --  9.7   EOS PCT AUTO %  --   --  3.8     Results from last 72 hours   Lab Units 12/10/24  0628 12/09/24  0658 12/08/24  1854   SODIUM mmol/L 139 138 136   POTASSIUM mmol/L 3.8 3.6 3.9   CHLORIDE mmol/L 100 103 100   CO2 mmol/L 29 28 27   BUN mg/dL 11 10 13   CREATININE mg/dL 0.94 1.03 1.39*   GLUCOSE mg/dL 105* 107* 100*   CALCIUM mg/dL 9.2 8.4* 8.9   ANION  "GAP mmol/L 14 11 13   EGFR mL/min/1.73m*2 85 76 53*   PHOSPHORUS mg/dL 3.7 4.0  --      Results from last 72 hours   Lab Units 12/10/24  0628 12/09/24  0658 12/08/24  1854   ALK PHOS U/L  --   --  105   BILIRUBIN TOTAL mg/dL  --   --  0.5   PROTEIN TOTAL g/dL  --   --  7.2   ALT U/L  --   --  27   AST U/L  --   --  30   ALBUMIN g/dL 3.4 3.1* 3.6     Estimated Creatinine Clearance: 93.5 mL/min (by C-G formula based on SCr of 0.94 mg/dL).  No results found for: \"CRP\"  Microbiology  Reviewed  Imaging  Reviewed        Assessment/Plan   Legs stasis   UTI, the culture is negative, he probably has chronic retention secondary to enlarged prostate   Gluteus hematoma     Recommendations :  Continue Rocephin, can complete a course of antibiotics with oral cephalosporin if decide to go home  Discussed with the medical team     I spent minutes in the professional and overall care of this patient.      Shanika Castillo MD  "

## 2024-12-10 NOTE — NURSING NOTE
1300- Assumed care of pt. Pt sitting in chair with call light in reach and no needs voiced at this time. Pt wife at bedside and awaiting discharge paperwork.

## 2024-12-10 NOTE — DISCHARGE SUMMARY
Discharge Diagnosis  B/l Venous stasis  C/f Cellulitis      Issues Requiring Follow-Up  B/l venous statis  Urinary retention 2/2 BPH    Discharge Meds     Medication List      START taking these medications     cephalexin 500 mg capsule; Commonly known as: Keflex; Take 1 capsule   (500 mg) by mouth 3 times a day for 7 days.     CONTINUE taking these medications     albuterol 90 mcg/actuation inhaler   folic acid 1 mg tablet; Commonly known as: Folvite   ipratropium-albuteroL 0.5-2.5 mg/3 mL nebulizer solution; Commonly known   as: Duo-Neb   rosuvastatin 20 mg tablet; Commonly known as: Crestor   tamsulosin 0.4 mg 24 hr capsule; Commonly known as: Flomax   tiotropium 2.5 mcg/actuation inhaler; Commonly known as: Spiriva   Respimat   Vitamin D3 50 MCG (2000 UT) tablet; Generic drug: cholecalciferol       Test Results Pending At Discharge  Pending Labs       No current pending labs.            Hospital Course  Brief HPI:  74 y.o. year old male  patient with PMHx of COPD (on 1-2L NC at baseline), DVT, PE, BPH, chronic back pain, and osteoarthritis who presented to [facility] as a transfer from [place] for further management of UTI and lower extremity edema. Notably, patient was recently admitted to [place] 11/3-11/16 for management after a 6-foot fall from a ladder with multiple muscle and tendon tears with gluteal hematoma formation, COPD exacerbation, and UTI.  Patient states that since discharge from this admission he has had progressive swelling in his bilateral lower extremities with increasing redness which has spread up his legs progressively towards his knees. States that after discharge from [place], he was having a hard time getting around at home and has been using a walker.  He does also endorse urinary symptoms including dysuria, foul-smelling urine, frequent urination with oliguria for the past 4 to 5 days. During recent admission at [place], patient was noted to have UTI and culture was growing  MDRO morganella morganii - he was treated with a 7-day course of antibiotics for this using ceftriaxone and cefuroxime based on sensitivity results.     Floors:  Admitted and started Rocephin. Infectious Disease was consulted and do not suspect active cellulitis and instead venous stasis b/l. He remained hemodynamically stable throughout his stay, including his baseline oxygen of 2L. No worsening numbness and paresthesia in b/l legs. Wound care was consulted and provided Ace wraps. Patient to be discharged with course of cephalexin and recommending to follow up with wound care outpatient.    Pertinent Physical Exam At Time of Discharge  Physical Exam  Constitutional:       General: He is not in acute distress.     Appearance: Normal appearance.   Cardiovascular:      Rate and Rhythm: Normal rate and regular rhythm.      Pulses: Normal pulses.      Heart sounds: Normal heart sounds.   Pulmonary:      Effort: Pulmonary effort is normal.      Breath sounds: Normal breath sounds.      Comments: 2L NC  Abdominal:      Palpations: Abdomen is soft.      Tenderness: There is abdominal tenderness. There is no guarding or rebound.      Comments: Mild focal TTP in LLQ   Musculoskeletal:      Right lower leg: Edema present.      Left lower leg: Edema present.      Comments: +1-2 pitting edema in b/l LE   Skin:     General: Skin is warm and dry.      Findings: Rash present.      Comments: Erythema of b/l lower extermities, covered in Ace wrap   Neurological:      General: No focal deficit present.      Mental Status: He is alert and oriented to person, place, and time.         Outpatient Follow-Up  No future appointments.      Padmini Germain DO

## 2024-12-10 NOTE — DISCHARGE INSTRUCTIONS
Please, take your home medications as instructed after being discharged from the hospital.     NEW MEDICATIONS:  Take x1 capsule of Keflex 500 mg THREE TIMES A DAY for x7 days    OTHER INSTRUCTIONS:        UPCOMING APPOINTMENTS:  Please follow up with Wound Clinic outpatient.      Please, follow-up with your primary care provider within 7 to 14 days after leaving the hospital. /appointment services has been requested to make an appointment for you, however if you do not hear back from them within 1 to 2 days, please call your primary physician's office to schedule an appointment. Bring your photo ID and insurance card to your appointment.   [facility]  services can be reached at [phone].     If you experience any worsening symptoms or have any concerns, please contact your primary care provider to schedule an appointment. If you cannot get in touch with your primary care physician, please return to the nearest emergency room or urgent care clinic for an evaluation and treatment.     Thank you for choosing [facility] and allowing us to partake in your medical care!     - Your [facility] inpatient primary care team.

## 2024-12-10 NOTE — PROGRESS NOTES
Vancomycin Dosing by Pharmacy- FOLLOW UP    Christiano Cox is a 74 y.o. year old male who Pharmacy has been consulted for vancomycin dosing for cellulitis, skin and soft tissue. Based on the patient's indication and renal status this patient is being dosed based on a goal trough/random level of 10-15.     Renal function is currently improving, Scr is back at baseline.    Current vancomycin dose: dose by level due to REYNOLD. Given 2g x1.     Most recent random level: 5.7 mcg/mL    Visit Vitals  /65 (BP Location: Right arm, Patient Position: Lying)   Pulse 71   Temp 37 °C (98.6 °F) (Temporal)   Resp 20        Lab Results   Component Value Date    CREATININE 1.03 2024    CREATININE 1.39 (H) 2024    CREATININE 0.86 2024    CREATININE 1.15 11/15/2024        Patient weight is as follows:   Vitals:    24 2307   Weight: 127 kg (279 lb 15.8 oz)       Cultures:  No results found for the encounter in last 14 days.       I/O last 3 completed shifts:  In: 630 (5 mL/kg) [P.O.:580; IV Piggyback:50]  Out: 495 (3.9 mL/kg) [Urine:495 (0.1 mL/kg/hr)]  Weight: 127 kg   I/O during current shift:  No intake/output data recorded.    Temp (24hrs), Av.8 °C (98.2 °F), Min:36.1 °C (97 °F), Max:37.7 °C (99.9 °F)      Assessment/Plan    Below goal random/trough level. Orders placed for new vancomycin regimen of 2,000 mg every 24 hours to begin at now/23:30 . REYNOLD resolved. Will dose by AUC goal of 400-600 now.    This dosing regimen is predicted by [employer] to result in the following pharmacokinetic parameters:  Regimen: 2000 mg IV every 24 hours.  Start time: 22:59 on 2024  Exposure target: AUC24 (range)400-600 mg/L.hr   [id number]: 380 mg/L.hr  AUC24,ss: 421 mg/L.hr  Probability of AUC24 > 400: 62 %  Ctrough,ss: 11.8 mg/L  Probability of Ctrough,ss > 20: 4 %      The next level will be obtained on 24 at first am draw. May be obtained sooner if clinically indicated.   Will continue to monitor renal  function daily while on vancomycin and order serum creatinine at least every 48 hours if not already ordered.  Follow for continued vancomycin needs, clinical response, and signs/symptoms of toxicity.       Henny Gayle, PharmD

## 2024-12-10 NOTE — CONSULTS
Consults  Referred by DAMIAN Restrepo MD: Brenna Bain, APRN-CNP    Reason For Consult  Cellulitis, UTI, positive MRSA screen    History Of Present Illness  Christiano Cox is a 74 y.o. male, hx of obesity, hx of COPD on O2, hx of BPH, hx of DVT / PE,, hx of recent fall with gluteus strain and hematomas, hx of recent treatment for UTI, the culture with MDR Morganella, he was admitted for increasing legs swelling and redness since his fall, dysuria, decreased urine out put, suprapubic pain, back pain, no fever, no emesis, no hematuria, no legs drainage, the legs us wiyout DVT, the UA with pyuria, the WBC are N     Past Medical History  He has a past medical history of Acute pulmonary embolism with acute cor pulmonale (Multi) (10/19/2023), COPD (chronic obstructive pulmonary disease) (Multi), DVT (deep venous thrombosis) (Multi), Elevated troponin (10/14/2023), History of pulmonary embolism (2024), Leukocytosis (10/14/2023), Myocardial injury (10/19/2023), Respiratory acidosis (10/15/2023), Retention of urine, unspecified (2024), Shingles (2024), and Tobacco use disorder (10/28/2010).    Surgical History  He has a past surgical history that includes Cataract extraction, bilateral (Bilateral).     Social History     Occupational History    Not on file   Tobacco Use    Smoking status: Former     Current packs/day: 0.00     Average packs/day: 2.0 packs/day for 63.1 years (126.3 ttl pk-yrs)     Types: Cigarettes     Start date: 1959     Quit date:      Years since quittin.9    Smokeless tobacco: Never   Vaping Use    Vaping status: Never Used   Substance and Sexual Activity    Alcohol use: Not Currently    Drug use: Never    Sexual activity: Defer     Travel History   Travel since 24    No documented travel since 24       Family History  No family history on file., no immunodeficiency  Allergies  Patient has no known allergies.     There is no immunization history  for the selected administration types on file for this patient.  Pneumonia and influenza vaccines are planned  Graham fall risk 85, preventive protocol was implemented  Depression screen is negative    Medications  Home medications:  Medications Prior to Admission   Medication Sig Dispense Refill Last Dose/Taking    albuterol 90 mcg/actuation inhaler Inhale 2 puffs every 6 hours if needed.       cholecalciferol (Vitamin D3) 50 MCG (2000 UT) tablet Take 1 tablet (50 mcg) by mouth every other day.       folic acid (Folvite) 1 mg tablet Take 1 tablet (1 mg) by mouth once daily.       ipratropium-albuteroL (Duo-Neb) 0.5-2.5 mg/3 mL nebulizer solution Take 3 mL by nebulization 4 times a day as needed for shortness of breath or wheezing.       rosuvastatin (Crestor) 20 mg tablet Take 0.5 tablets (10 mg) by mouth once daily in the evening.       tamsulosin (Flomax) 0.4 mg 24 hr capsule Take 2 capsules (0.8 mg) by mouth once daily.       tiotropium (Spiriva Respimat) 2.5 mcg/actuation inhaler Inhale 2 puffs once daily.        Current medications:  Scheduled medications  cefTRIAXone, 1 g, intravenous, q24h  enoxaparin, 40 mg, subcutaneous, q24h  oxygen, , inhalation, Continuous - Inhalation  perflutren lipid microspheres, 0.5-10 mL of dilution, intravenous, Once in imaging  perflutren protein A microsphere, 0.5 mL, intravenous, Once in imaging  rosuvastatin, 10 mg, oral, q PM  sulfur hexafluoride microsphr, 2 mL, intravenous, Once in imaging  tamsulosin, 0.8 mg, oral, Daily  tiotropium, 2 puff, inhalation, Daily      Continuous medications     PRN medications  PRN medications: acetaminophen **OR** acetaminophen **OR** acetaminophen, cyclobenzaprine, ipratropium-albuteroL, oxyCODONE, oxyCODONE, polyethylene glycol, vancomycin    Review of Systems   All other systems reviewed and are negative.       Objective  Range of Vitals (last 24 hours)  Heart Rate:  [71-90]   Temp:  [36.1 °C (97 °F)-37.7 °C (99.9 °F)]   Resp:  [16-22]  "  BP: (116-138)/(57-79)   Height:  [182.9 cm (6' 0.01\")]   Weight:  [127 kg (279 lb 15.8 oz)]   SpO2:  [89 %-96 %]   Daily Weight  12/08/24 : 127 kg (279 lb 15.8 oz)    Body mass index is 37.96 kg/m².   Nutritional consult    Physical Exam  Constitutional:       Appearance: Normal appearance.   HENT:      Head: Normocephalic and atraumatic.      Mouth/Throat:      Mouth: Mucous membranes are moist.      Pharynx: Oropharynx is clear.   Eyes:      Pupils: Pupils are equal, round, and reactive to light.   Cardiovascular:      Rate and Rhythm: Normal rate and regular rhythm.      Heart sounds: Normal heart sounds.   Pulmonary:      Effort: Pulmonary effort is normal.      Breath sounds: Normal breath sounds.   Abdominal:      General: Abdomen is flat. Bowel sounds are normal.      Palpations: Abdomen is soft.   Musculoskeletal:      Cervical back: Normal range of motion.      Comments: Bilateral legs edema / stasis   Neurological:      Mental Status: He is alert.          Relevant Results  Outside Hospital Results  reviewed  Labs  Results from last 72 hours   Lab Units 12/09/24  0658 12/08/24  1854   WBC AUTO x10*3/uL 5.7 6.6   HEMOGLOBIN g/dL 11.9* 13.2*   HEMATOCRIT % 37.0* 41.2   PLATELETS AUTO x10*3/uL 208 242   NEUTROS PCT AUTO %  --  63.4   LYMPHS PCT AUTO %  --  22.4   MONOS PCT AUTO %  --  9.7   EOS PCT AUTO %  --  3.8     Results from last 72 hours   Lab Units 12/09/24  0658 12/08/24  1854   SODIUM mmol/L 138 136   POTASSIUM mmol/L 3.6 3.9   CHLORIDE mmol/L 103 100   CO2 mmol/L 28 27   BUN mg/dL 10 13   CREATININE mg/dL 1.03 1.39*   GLUCOSE mg/dL 107* 100*   CALCIUM mg/dL 8.4* 8.9   ANION GAP mmol/L 11 13   EGFR mL/min/1.73m*2 76 53*   PHOSPHORUS mg/dL 4.0  --      Results from last 72 hours   Lab Units 12/09/24  0658 12/08/24  1854   ALK PHOS U/L  --  105   BILIRUBIN TOTAL mg/dL  --  0.5   PROTEIN TOTAL g/dL  --  7.2   ALT U/L  --  27   AST U/L  --  30   ALBUMIN g/dL 3.1* 3.6     Estimated Creatinine " "Clearance: 85.4 mL/min (by C-G formula based on SCr of 1.03 mg/dL).  No results found for: \"CRP\", \"SEDRATE\"  No results found for: \"HIV1X2\", \"HIVCONF\", \"[id number]\"  No results found for: \"HEPCABINIT\", \"HEPCAB\", \"HCVPCRQUANT\"  Microbiology  Susceptibility data from last 90 days.  Collected Specimen Info Organism Amoxicillin/Clavulanate Ampicillin Ampicillin/Sulbactam Cefazolin Cefotaxime Ceftriaxone Ciprofloxacin Gentamicin Imipenem Nitrofurantoin Piperacillin/Tazobactam Trimethoprim/Sulfamethoxazole   11/13/24 Urine from Clean Catch/Voided Morganella morganii  R  R  R  R  R  S  S  S  R  R  S  S   Imaging  Reviewed      Assessment/Plan   Legs stasis   UTI  Gluteus hematoma    Recommendations :  Continue Rocephin  Keep the legs elevated  Post void volume check  Cultures    I spent minutes in the professional and overall care of this patient.      Shanika Castillo MD  "

## 2024-12-13 LAB
BACTERIA BLD CULT: NORMAL
BACTERIA BLD CULT: NORMAL

## 2024-12-13 NOTE — SIGNIFICANT EVENT
Follow Up Phone Call    Outgoing phone call    Spoke to: Christiano Cox Relationship:self   Phone number: [phone]      Outcome: No answer/busy signal   No chief complaint on file.         Diagnosis:Not applicable           Chronic lower back/sciatica pain. CT lumbar showed L1 compression fracture.   No signs of neurological impairment, reflexes intact.   Percocet for moderate pain. Chronic lower back/sciatica pain, not worsened today. CT lumbar showed mild L1 compression fracture, likely chronic since no worsening pain.   No signs of neurological impairment, reflexes intact, good rectal tone.  Percocet for moderate pain.

## 2024-12-14 NOTE — SIGNIFICANT EVENT
Follow Up Phone Call    Outgoing phone call    Spoke to: Christiano Cox Relationship:self   Phone number: [phone]      Outcome: contacted patient/ family   No chief complaint on file.         Diagnosis:Not applicable    States he is feeling better. No further questions or concerns.

## 2024-12-15 LAB
ATRIAL RATE: 84 BPM
P AXIS: 89 DEGREES
P OFFSET: 175 MS
P ONSET: 120 MS
PR INTERVAL: 176 MS
Q ONSET: 208 MS
QRS COUNT: 14 BEATS
QRS DURATION: 100 MS
QT INTERVAL: 362 MS
QTC CALCULATION(BAZETT): 427 MS
QTC FREDERICIA: 404 MS
R AXIS: 12 DEGREES
T AXIS: 53 DEGREES
T OFFSET: 389 MS
VENTRICULAR RATE: 84 BPM

## 2024-12-19 NOTE — DOCUMENTATION CLARIFICATION NOTE
"    PATIENT:               YVETTE HERRING  ACCT #:                  [id number]  MRN:                       [id number]  :                       1950  ADMIT DATE:       2024 11:03 PM  DISCH DATE:        12/10/2024 2:47 PM  RESPONDING PROVIDER #:        [id number]          PROVIDER RESPONSE TEXT:    Venous insufficiency and stasis without venous stasis dermatitis    CDI QUERY TEXT:    Clarification      Instruction:    Based on your assessment of the patient and the clinical information, please provide the requested documentation by clicking on the appropriate radio button and enter any additional information if prompted.    Question: Please further clarify the diagnosis of bilateral venous stasis    When answering this query, please exercise your independent professional judgment. The fact that a question is being asked, does not imply that any particular answer is desired or expected.    The patient's clinical indicators include:  Clinical Information: 74 year old man transferred from [place] for urinary symptoms and BLE redness and edema. Pt's diagnoses include UTI and venous stasis. Pt's PMH includes BPH, DVT, neuropathy, and recent fall from ladder with muscle and tendon tears with gluteal hematoma formation.    Clinical Indicators:   Significant Event: Benjamin Zamorano/[place]  \"(question marlen)B/L lower extremity cellulitis...(question marlen)Venous insufficiency...Neuropathy\"  \"...concerning for venous stasis dermatitis vs cellulitis. No known hx of CHF...diurese and compress/elevate legs.\"    12/10 ID Progress Notes: Dr. Castillo  \"Legs stasis...UTI, the culture is negative, he probably has chronic retention secondary to enlarged prostate...Continue Rocephin, can complete a course of antibiotics with oral cephalosporin...\"    12/10 DC Summary: Benjamin Georges  \"Presented with worsening lower extremity edema, appears to have venous stasis.  Treated with antibiotics for suspected superimposed cellulitis.  Discharged " "on...Keflex...\"    Treatment:  -Wound care BLE:  Adaptic. ABD, ace, elevation  -Ceftriaxone 1gm iv daily: 12/9-12/10    Risk Factors: age, recent fall, neuropathy  Options provided:  -- Venous insufficiency and stasis with venous stasis dermatitis  -- Venous insufficiency and stasis without venous stasis dermatitis  -- Other - I will add my own diagnosis  -- Refer to Clinical Documentation Reviewer    Query created by: Farrah Dumont on 12/18/2024 2:20 PM      Electronically signed by:  AMARJIT HIGHTOWER DO 12/18/2024 9:54 PM          "

## 2025-08-05 ENCOUNTER — APPOINTMENT (OUTPATIENT)
Dept: URBAN - METROPOLITAN AREA CLINIC 102 | Facility: CLINIC | Age: 75
Setting detail: DERMATOLOGY
End: 2025-08-05

## 2025-08-05 DIAGNOSIS — D18.0 HEMANGIOMA: ICD-10-CM

## 2025-08-05 DIAGNOSIS — L81.4 OTHER MELANIN HYPERPIGMENTATION: ICD-10-CM

## 2025-08-05 DIAGNOSIS — D485 NEOPLASM OF UNCERTAIN BEHAVIOR OF SKIN: ICD-10-CM

## 2025-08-05 DIAGNOSIS — L82.1 OTHER SEBORRHEIC KERATOSIS: ICD-10-CM

## 2025-08-05 DIAGNOSIS — L72.0 EPIDERMAL CYST: ICD-10-CM

## 2025-08-05 DIAGNOSIS — L57.0 ACTINIC KERATOSIS: ICD-10-CM | Status: INADEQUATELY CONTROLLED

## 2025-08-05 DIAGNOSIS — F42.4 EXCORIATION (SKIN-PICKING) DISORDER: ICD-10-CM

## 2025-08-05 PROBLEM — D48.5 NEOPLASM OF UNCERTAIN BEHAVIOR OF SKIN: Status: ACTIVE | Noted: 2025-08-05

## 2025-08-05 PROBLEM — D18.01 HEMANGIOMA OF SKIN AND SUBCUTANEOUS TISSUE: Status: ACTIVE | Noted: 2025-08-05

## 2025-08-05 PROCEDURE — ? BIOPSY BY SHAVE METHOD

## 2025-08-05 PROCEDURE — ? LIQUID NITROGEN

## 2025-08-05 PROCEDURE — ? CONSULTATION EXCISION

## 2025-08-05 PROCEDURE — ? COUNSELING

## 2025-08-05 ASSESSMENT — LOCATION DETAILED DESCRIPTION DERM
LOCATION DETAILED: LEFT INFERIOR CENTRAL MALAR CHEEK
LOCATION DETAILED: LEFT DISTAL DORSAL FOREARM
LOCATION DETAILED: RIGHT ANTERIOR PROXIMAL THIGH
LOCATION DETAILED: RIGHT MEDIAL FOREHEAD
LOCATION DETAILED: RIGHT DISTAL DORSAL FOREARM
LOCATION DETAILED: LEFT MEDIAL SUPERIOR CHEST
LOCATION DETAILED: RIGHT SUPERIOR UPPER BACK
LOCATION DETAILED: LEFT SUPERIOR UPPER BACK
LOCATION DETAILED: INFERIOR THORACIC SPINE
LOCATION DETAILED: STERNUM
LOCATION DETAILED: LEFT SUPERIOR HELIX
LOCATION DETAILED: RIGHT SUPERIOR HELIX
LOCATION DETAILED: RIGHT INFERIOR CENTRAL MALAR CHEEK
LOCATION DETAILED: LEFT LATERAL SHOULDER
LOCATION DETAILED: PERIUMBILICAL SKIN
LOCATION DETAILED: RIGHT NASAL DORSUM
LOCATION DETAILED: RIGHT INFERIOR UPPER BACK
LOCATION DETAILED: RIGHT KNEE

## 2025-08-05 ASSESSMENT — LOCATION SIMPLE DESCRIPTION DERM
LOCATION SIMPLE: RIGHT EAR
LOCATION SIMPLE: RIGHT KNEE
LOCATION SIMPLE: CHEST
LOCATION SIMPLE: RIGHT FOREARM
LOCATION SIMPLE: NOSE
LOCATION SIMPLE: LEFT SHOULDER
LOCATION SIMPLE: LEFT EAR
LOCATION SIMPLE: RIGHT FOREHEAD
LOCATION SIMPLE: ABDOMEN
LOCATION SIMPLE: UPPER BACK
LOCATION SIMPLE: LEFT UPPER BACK
LOCATION SIMPLE: RIGHT UPPER BACK
LOCATION SIMPLE: RIGHT THIGH
LOCATION SIMPLE: LEFT FOREARM
LOCATION SIMPLE: RIGHT CHEEK
LOCATION SIMPLE: LEFT CHEEK

## 2025-08-05 ASSESSMENT — LOCATION ZONE DERM
LOCATION ZONE: ARM
LOCATION ZONE: TRUNK
LOCATION ZONE: LEG
LOCATION ZONE: NOSE
LOCATION ZONE: EAR
LOCATION ZONE: FACE

## 2025-08-05 ASSESSMENT — TOTAL NUMBER OF LESIONS: # OF LESIONS?: 5

## 2025-08-21 ENCOUNTER — APPOINTMENT (OUTPATIENT)
Dept: URBAN - METROPOLITAN AREA CLINIC 102 | Facility: CLINIC | Age: 75
Setting detail: DERMATOLOGY
End: 2025-08-21

## 2025-08-21 PROBLEM — C44.519 BASAL CELL CARCINOMA OF SKIN OF OTHER PART OF TRUNK: Status: ACTIVE | Noted: 2025-08-21

## 2025-08-21 PROCEDURE — ? CURETTAGE AND DESTRUCTION

## 2025-08-23 ENCOUNTER — APPOINTMENT (OUTPATIENT)
Dept: CARDIOLOGY | Facility: HOSPITAL | Age: 75
DRG: 602 | End: 2025-08-23
Payer: MEDICARE

## 2025-08-23 ENCOUNTER — APPOINTMENT (OUTPATIENT)
Dept: RADIOLOGY | Facility: HOSPITAL | Age: 75
DRG: 602 | End: 2025-08-23
Payer: MEDICARE

## 2025-08-23 ENCOUNTER — HOSPITAL ENCOUNTER (INPATIENT)
Facility: HOSPITAL | Age: 75
DRG: 602 | End: 2025-08-23
Attending: EMERGENCY MEDICINE | Admitting: INTERNAL MEDICINE
Payer: MEDICARE

## 2025-08-23 PROCEDURE — 93970 EXTREMITY STUDY: CPT

## 2025-08-23 PROCEDURE — 93005 ELECTROCARDIOGRAM TRACING: CPT | Mod: 76

## 2025-08-23 PROCEDURE — 71275 CT ANGIOGRAPHY CHEST: CPT

## 2025-08-23 PROCEDURE — 70450 CT HEAD/BRAIN W/O DYE: CPT

## 2025-08-23 SDOH — ECONOMIC STABILITY: FOOD INSECURITY: WITHIN THE PAST 12 MONTHS, THE FOOD YOU BOUGHT JUST DIDN'T LAST AND YOU DIDN'T HAVE MONEY TO GET MORE.: NEVER TRUE

## 2025-08-23 SDOH — ECONOMIC STABILITY: INCOME INSECURITY: IN THE PAST 12 MONTHS HAS THE ELECTRIC, GAS, OIL, OR WATER COMPANY THREATENED TO SHUT OFF SERVICES IN YOUR HOME?: NO

## 2025-08-23 SDOH — ECONOMIC STABILITY: FOOD INSECURITY: HOW HARD IS IT FOR YOU TO PAY FOR THE VERY BASICS LIKE FOOD, HOUSING, MEDICAL CARE, AND HEATING?: NOT VERY HARD

## 2025-08-23 SDOH — HEALTH STABILITY: MENTAL HEALTH: HOW OFTEN DO YOU HAVE SIX OR MORE DRINKS ON ONE OCCASION?: NEVER

## 2025-08-23 SDOH — ECONOMIC STABILITY: FOOD INSECURITY: WITHIN THE PAST 12 MONTHS, YOU WORRIED THAT YOUR FOOD WOULD RUN OUT BEFORE YOU GOT THE MONEY TO BUY MORE.: NEVER TRUE

## 2025-08-23 SDOH — ECONOMIC STABILITY: HOUSING INSECURITY: IN THE LAST 12 MONTHS, WAS THERE A TIME WHEN YOU WERE NOT ABLE TO PAY THE MORTGAGE OR RENT ON TIME?: NO

## 2025-08-23 SDOH — HEALTH STABILITY: MENTAL HEALTH: HOW MANY DRINKS CONTAINING ALCOHOL DO YOU HAVE ON A TYPICAL DAY WHEN YOU ARE DRINKING?: PATIENT DOES NOT DRINK

## 2025-08-23 SDOH — SOCIAL STABILITY: SOCIAL INSECURITY: WITHIN THE LAST YEAR, HAVE YOU BEEN HUMILIATED OR EMOTIONALLY ABUSED IN OTHER WAYS BY YOUR PARTNER OR EX-PARTNER?: NO

## 2025-08-23 SDOH — SOCIAL STABILITY: SOCIAL INSECURITY: DO YOU FEEL UNSAFE GOING BACK TO THE PLACE WHERE YOU ARE LIVING?: NO

## 2025-08-23 SDOH — SOCIAL STABILITY: SOCIAL INSECURITY: ARE YOU OR HAVE YOU BEEN THREATENED OR ABUSED PHYSICALLY, EMOTIONALLY, OR SEXUALLY BY ANYONE?: NO

## 2025-08-23 SDOH — HEALTH STABILITY: MENTAL HEALTH: HOW OFTEN DO YOU HAVE A DRINK CONTAINING ALCOHOL?: NEVER

## 2025-08-23 SDOH — SOCIAL STABILITY: SOCIAL INSECURITY: HAS ANYONE EVER THREATENED TO HURT YOUR FAMILY OR YOUR PETS?: NO

## 2025-08-23 SDOH — SOCIAL STABILITY: SOCIAL INSECURITY: HAVE YOU HAD THOUGHTS OF HARMING ANYONE ELSE?: NO

## 2025-08-23 SDOH — ECONOMIC STABILITY: HOUSING INSECURITY: IN THE PAST 12 MONTHS, HOW MANY TIMES HAVE YOU MOVED WHERE YOU WERE LIVING?: 0

## 2025-08-23 SDOH — SOCIAL STABILITY: SOCIAL INSECURITY: ABUSE: ADULT

## 2025-08-23 SDOH — HEALTH STABILITY: PHYSICAL HEALTH: ON AVERAGE, HOW MANY DAYS PER WEEK DO YOU ENGAGE IN MODERATE TO STRENUOUS EXERCISE (LIKE A BRISK WALK)?: 0 DAYS

## 2025-08-23 SDOH — SOCIAL STABILITY: SOCIAL INSECURITY: WITHIN THE LAST YEAR, HAVE YOU BEEN AFRAID OF YOUR PARTNER OR EX-PARTNER?: NO

## 2025-08-23 SDOH — ECONOMIC STABILITY: HOUSING INSECURITY: AT ANY TIME IN THE PAST 12 MONTHS, WERE YOU HOMELESS OR LIVING IN A SHELTER (INCLUDING NOW)?: NO

## 2025-08-23 SDOH — SOCIAL STABILITY: SOCIAL INSECURITY: HAVE YOU HAD ANY THOUGHTS OF HARMING ANYONE ELSE?: NO

## 2025-08-23 SDOH — SOCIAL STABILITY: SOCIAL INSECURITY: DOES ANYONE TRY TO KEEP YOU FROM HAVING/CONTACTING OTHER FRIENDS OR DOING THINGS OUTSIDE YOUR HOME?: NO

## 2025-08-23 SDOH — SOCIAL STABILITY: SOCIAL INSECURITY: ARE THERE ANY APPARENT SIGNS OF INJURIES/BEHAVIORS THAT COULD BE RELATED TO ABUSE/NEGLECT?: NO

## 2025-08-23 SDOH — ECONOMIC STABILITY: TRANSPORTATION INSECURITY: IN THE PAST 12 MONTHS, HAS LACK OF TRANSPORTATION KEPT YOU FROM MEDICAL APPOINTMENTS OR FROM GETTING MEDICATIONS?: NO

## 2025-08-23 SDOH — HEALTH STABILITY: PHYSICAL HEALTH: ON AVERAGE, HOW MANY MINUTES DO YOU ENGAGE IN EXERCISE AT THIS LEVEL?: 0 MIN

## 2025-08-23 SDOH — SOCIAL STABILITY: SOCIAL INSECURITY: WERE YOU ABLE TO COMPLETE ALL THE BEHAVIORAL HEALTH SCREENINGS?: YES

## 2025-08-23 SDOH — SOCIAL STABILITY: SOCIAL INSECURITY: DO YOU FEEL ANYONE HAS EXPLOITED OR TAKEN ADVANTAGE OF YOU FINANCIALLY OR OF YOUR PERSONAL PROPERTY?: NO

## 2025-08-23 ASSESSMENT — ACTIVITIES OF DAILY LIVING (ADL)
ASSISTIVE_DEVICE: WALKER
PATIENT'S MEMORY ADEQUATE TO SAFELY COMPLETE DAILY ACTIVITIES?: YES
WALKS IN HOME: NEEDS ASSISTANCE
BATHING: INDEPENDENT
HEARING - RIGHT EAR: HEARING AID
ADEQUATE_TO_COMPLETE_ADL: YES
GROOMING: INDEPENDENT
JUDGMENT_ADEQUATE_SAFELY_COMPLETE_DAILY_ACTIVITIES: YES
FEEDING YOURSELF: INDEPENDENT
TOILETING: INDEPENDENT
HEARING - LEFT EAR: HEARING AID
DRESSING YOURSELF: INDEPENDENT
LACK_OF_TRANSPORTATION: NO

## 2025-08-23 ASSESSMENT — LIFESTYLE VARIABLES
AUDIT-C TOTAL SCORE: 0
SKIP TO QUESTIONS 9-10: 1
HOW OFTEN DO YOU HAVE A DRINK CONTAINING ALCOHOL: NEVER
SUBSTANCE_ABUSE_PAST_12_MONTHS: NO
HOW MANY STANDARD DRINKS CONTAINING ALCOHOL DO YOU HAVE ON A TYPICAL DAY: PATIENT DOES NOT DRINK
HOW OFTEN DO YOU HAVE 6 OR MORE DRINKS ON ONE OCCASION: NEVER
SKIP TO QUESTIONS 9-10: 1
PRESCIPTION_ABUSE_PAST_12_MONTHS: NO
AUDIT-C TOTAL SCORE: 0
AUDIT-C TOTAL SCORE: 0

## 2025-08-23 ASSESSMENT — COGNITIVE AND FUNCTIONAL STATUS - GENERAL
PATIENT BASELINE BEDBOUND: NO
PERSONAL GROOMING: A LITTLE
WALKING IN HOSPITAL ROOM: A LITTLE
DRESSING REGULAR LOWER BODY CLOTHING: A LITTLE
CLIMB 3 TO 5 STEPS WITH RAILING: A LOT
STANDING UP FROM CHAIR USING ARMS: A LITTLE
TOILETING: A LITTLE
MOBILITY SCORE: 17
DAILY ACTIVITIY SCORE: 19
TURNING FROM BACK TO SIDE WHILE IN FLAT BAD: A LITTLE
DRESSING REGULAR UPPER BODY CLOTHING: A LITTLE
MOVING TO AND FROM BED TO CHAIR: A LITTLE
MOVING FROM LYING ON BACK TO SITTING ON SIDE OF FLAT BED WITH BEDRAILS: A LITTLE
HELP NEEDED FOR BATHING: A LITTLE

## 2025-08-23 ASSESSMENT — PAIN DESCRIPTION - LOCATION: LOCATION: LEG

## 2025-08-23 ASSESSMENT — PAIN SCALES - GENERAL
PAINLEVEL_OUTOF10: 0 - NO PAIN
PAINLEVEL_OUTOF10: 0 - NO PAIN
PAINLEVEL_OUTOF10: 7

## 2025-08-23 ASSESSMENT — PAIN - FUNCTIONAL ASSESSMENT
PAIN_FUNCTIONAL_ASSESSMENT: 0-10
PAIN_FUNCTIONAL_ASSESSMENT: 0-10

## 2025-08-23 ASSESSMENT — PAIN DESCRIPTION - ORIENTATION: ORIENTATION: RIGHT;LEFT

## 2025-08-24 ENCOUNTER — APPOINTMENT (OUTPATIENT)
Dept: RADIOLOGY | Facility: HOSPITAL | Age: 75
DRG: 602 | End: 2025-08-24
Payer: MEDICARE

## 2025-08-24 PROBLEM — R55 SYNCOPE: Status: ACTIVE | Noted: 2025-08-24

## 2025-08-24 PROBLEM — D69.6 THROMBOCYTOPENIA: Status: ACTIVE | Noted: 2025-08-24

## 2025-08-24 ASSESSMENT — ENCOUNTER SYMPTOMS
CONFUSION: 1
JOINT SWELLING: 0
ARTHRALGIAS: 0
COUGH: 1
COLOR CHANGE: 1
DIZZINESS: 1
VOMITING: 0
ABDOMINAL PAIN: 0
SHORTNESS OF BREATH: 0
ACTIVITY CHANGE: 0
ABDOMINAL DISTENTION: 0
FATIGUE: 1
NAUSEA: 0
WEAKNESS: 1
CHEST TIGHTNESS: 0

## 2025-08-24 ASSESSMENT — COGNITIVE AND FUNCTIONAL STATUS - GENERAL
EATING MEALS: A LITTLE
MOVING TO AND FROM BED TO CHAIR: TOTAL
DRESSING REGULAR LOWER BODY CLOTHING: TOTAL
DRESSING REGULAR UPPER BODY CLOTHING: TOTAL
HELP NEEDED FOR BATHING: TOTAL
PERSONAL GROOMING: TOTAL
MOVING FROM LYING ON BACK TO SITTING ON SIDE OF FLAT BED WITH BEDRAILS: TOTAL
STANDING UP FROM CHAIR USING ARMS: TOTAL
DAILY ACTIVITIY SCORE: 8
TURNING FROM BACK TO SIDE WHILE IN FLAT BAD: TOTAL
MOBILITY SCORE: 12
TOILETING: TOTAL

## 2025-08-24 ASSESSMENT — PAIN SCALES - GENERAL
PAINLEVEL_OUTOF10: 0 - NO PAIN
PAINLEVEL_OUTOF10: 0 - NO PAIN

## 2025-08-24 ASSESSMENT — PAIN - FUNCTIONAL ASSESSMENT
PAIN_FUNCTIONAL_ASSESSMENT: 0-10
PAIN_FUNCTIONAL_ASSESSMENT: 0-10

## 2025-08-25 ENCOUNTER — APPOINTMENT (OUTPATIENT)
Dept: RADIOLOGY | Facility: HOSPITAL | Age: 75
DRG: 602 | End: 2025-08-25
Payer: MEDICARE

## 2025-08-25 PROBLEM — R50.9 FEVER: Status: ACTIVE | Noted: 2025-08-25

## 2025-08-25 PROCEDURE — 70551 MRI BRAIN STEM W/O DYE: CPT | Mod: 52,IPSPLIT

## 2025-08-25 ASSESSMENT — PAIN SCALES - GENERAL
PAINLEVEL_OUTOF10: 0 - NO PAIN

## 2025-08-25 ASSESSMENT — COGNITIVE AND FUNCTIONAL STATUS - GENERAL
DAILY ACTIVITIY SCORE: 12
MOBILITY SCORE: 10
HELP NEEDED FOR BATHING: A LOT
DRESSING REGULAR LOWER BODY CLOTHING: A LOT
STANDING UP FROM CHAIR USING ARMS: A LOT
MOVING TO AND FROM BED TO CHAIR: TOTAL
DRESSING REGULAR UPPER BODY CLOTHING: A LOT
TOILETING: TOTAL
EATING MEALS: A LITTLE
CLIMB 3 TO 5 STEPS WITH RAILING: TOTAL
WALKING IN HOSPITAL ROOM: A LOT
PERSONAL GROOMING: A LOT
TURNING FROM BACK TO SIDE WHILE IN FLAT BAD: A LOT
MOVING FROM LYING ON BACK TO SITTING ON SIDE OF FLAT BED WITH BEDRAILS: A LOT

## 2025-08-25 ASSESSMENT — ENCOUNTER SYMPTOMS
COUGH: 1
FATIGUE: 1
NEUROLOGICAL NEGATIVE: 1
COLOR CHANGE: 1
PSYCHIATRIC NEGATIVE: 1
EYES NEGATIVE: 1
SHORTNESS OF BREATH: 1
GASTROINTESTINAL NEGATIVE: 1
ENDOCRINE NEGATIVE: 1
FEVER: 1
MUSCULOSKELETAL NEGATIVE: 1

## 2025-08-25 ASSESSMENT — PAIN - FUNCTIONAL ASSESSMENT
PAIN_FUNCTIONAL_ASSESSMENT: 0-10

## 2025-08-25 ASSESSMENT — ACTIVITIES OF DAILY LIVING (ADL): LACK_OF_TRANSPORTATION: NO

## 2025-08-26 ENCOUNTER — APPOINTMENT (OUTPATIENT)
Dept: RADIOLOGY | Facility: HOSPITAL | Age: 75
DRG: 602 | End: 2025-08-26
Payer: MEDICARE

## 2025-08-26 PROCEDURE — 71045 X-RAY EXAM CHEST 1 VIEW: CPT | Mod: IPSPLIT

## 2025-08-26 ASSESSMENT — PAIN - FUNCTIONAL ASSESSMENT
PAIN_FUNCTIONAL_ASSESSMENT: 0-10

## 2025-08-26 ASSESSMENT — COGNITIVE AND FUNCTIONAL STATUS - GENERAL
DAILY ACTIVITIY SCORE: 15
DAILY ACTIVITIY SCORE: 7
HELP NEEDED FOR BATHING: A LOT
TOILETING: A LOT
STANDING UP FROM CHAIR USING ARMS: A LOT
TURNING FROM BACK TO SIDE WHILE IN FLAT BAD: A LOT
WALKING IN HOSPITAL ROOM: TOTAL
DRESSING REGULAR UPPER BODY CLOTHING: A LITTLE
PERSONAL GROOMING: A LOT
CLIMB 3 TO 5 STEPS WITH RAILING: TOTAL
MOVING TO AND FROM BED TO CHAIR: TOTAL
HELP NEEDED FOR BATHING: TOTAL
WALKING IN HOSPITAL ROOM: A LOT
EATING MEALS: A LOT
DAILY ACTIVITIY SCORE: 8
CLIMB 3 TO 5 STEPS WITH RAILING: TOTAL
TOILETING: TOTAL
STANDING UP FROM CHAIR USING ARMS: A LOT
MOVING TO AND FROM BED TO CHAIR: A LOT
TOILETING: TOTAL
MOBILITY SCORE: 11
MOVING FROM LYING ON BACK TO SITTING ON SIDE OF FLAT BED WITH BEDRAILS: A LOT
DRESSING REGULAR LOWER BODY CLOTHING: A LOT
MOBILITY SCORE: 9
MOVING TO AND FROM BED TO CHAIR: TOTAL
EATING MEALS: A LITTLE
MOVING FROM LYING ON BACK TO SITTING ON SIDE OF FLAT BED WITH BEDRAILS: A LOT
MOVING FROM LYING ON BACK TO SITTING ON SIDE OF FLAT BED WITH BEDRAILS: A LOT
EATING MEALS: A LOT
HELP NEEDED FOR BATHING: TOTAL
DRESSING REGULAR LOWER BODY CLOTHING: TOTAL
PERSONAL GROOMING: TOTAL
STANDING UP FROM CHAIR USING ARMS: A LOT
PERSONAL GROOMING: A LITTLE
TURNING FROM BACK TO SIDE WHILE IN FLAT BAD: A LOT
WALKING IN HOSPITAL ROOM: A LOT
DRESSING REGULAR UPPER BODY CLOTHING: TOTAL
DRESSING REGULAR UPPER BODY CLOTHING: TOTAL
DRESSING REGULAR LOWER BODY CLOTHING: TOTAL
MOBILITY SCORE: 10
TURNING FROM BACK TO SIDE WHILE IN FLAT BAD: A LOT
CLIMB 3 TO 5 STEPS WITH RAILING: TOTAL

## 2025-08-26 ASSESSMENT — PAIN SCALES - GENERAL
PAINLEVEL_OUTOF10: 0 - NO PAIN

## 2025-08-26 ASSESSMENT — ACTIVITIES OF DAILY LIVING (ADL)
ADL_ASSISTANCE: NEEDS ASSISTANCE
BATHING_ASSISTANCE: MAXIMAL

## 2025-08-27 ASSESSMENT — PAIN SCALES - GENERAL
PAINLEVEL_OUTOF10: 0 - NO PAIN

## 2025-08-27 ASSESSMENT — PAIN SCALES - PAIN ASSESSMENT IN ADVANCED DEMENTIA (PAINAD)
NEGVOCALIZATION: OCCASIONAL MOAN/GROAN, LOW SPEECH, NEGATIVE/DISAPPROVING QUALITY
BREATHING: NORMAL
CONSOLABILITY: NO NEED TO CONSOLE
BODYLANGUAGE: RELAXED
TOTALSCORE: 1
FACIALEXPRESSION: SMILING OR INEXPRESSIVE

## 2025-08-27 ASSESSMENT — COGNITIVE AND FUNCTIONAL STATUS - GENERAL
MOVING TO AND FROM BED TO CHAIR: A LITTLE
DAILY ACTIVITIY SCORE: 15
STANDING UP FROM CHAIR USING ARMS: A LITTLE
MOVING TO AND FROM BED TO CHAIR: A LOT
MOVING FROM LYING ON BACK TO SITTING ON SIDE OF FLAT BED WITH BEDRAILS: A LITTLE
DAILY ACTIVITIY SCORE: 8
DRESSING REGULAR LOWER BODY CLOTHING: TOTAL
EATING MEALS: A LITTLE
TOILETING: A LOT
TOILETING: TOTAL
MOVING FROM LYING ON BACK TO SITTING ON SIDE OF FLAT BED WITH BEDRAILS: A LOT
DRESSING REGULAR LOWER BODY CLOTHING: A LOT
WALKING IN HOSPITAL ROOM: A LOT
CLIMB 3 TO 5 STEPS WITH RAILING: TOTAL
CLIMB 3 TO 5 STEPS WITH RAILING: TOTAL
PERSONAL GROOMING: A LOT
STANDING UP FROM CHAIR USING ARMS: A LOT
WALKING IN HOSPITAL ROOM: A LOT
EATING MEALS: A LOT
TURNING FROM BACK TO SIDE WHILE IN FLAT BAD: A LOT
MOBILITY SCORE: 14
HELP NEEDED FOR BATHING: A LOT
PERSONAL GROOMING: A LITTLE
HELP NEEDED FOR BATHING: TOTAL
MOBILITY SCORE: 11
TURNING FROM BACK TO SIDE WHILE IN FLAT BAD: A LOT
DRESSING REGULAR UPPER BODY CLOTHING: A LITTLE
DRESSING REGULAR UPPER BODY CLOTHING: TOTAL

## 2025-08-27 ASSESSMENT — ACTIVITIES OF DAILY LIVING (ADL): HOME_MANAGEMENT_TIME_ENTRY: 28

## 2025-08-27 ASSESSMENT — PAIN - FUNCTIONAL ASSESSMENT
PAIN_FUNCTIONAL_ASSESSMENT: PAINAD (PAIN ASSESSMENT IN ADVANCED DEMENTIA SCALE)
PAIN_FUNCTIONAL_ASSESSMENT: 0-10
PAIN_FUNCTIONAL_ASSESSMENT: PAINAD (PAIN ASSESSMENT IN ADVANCED DEMENTIA SCALE)
PAIN_FUNCTIONAL_ASSESSMENT: 0-10
PAIN_FUNCTIONAL_ASSESSMENT: 0-10

## 2025-08-28 ASSESSMENT — PAIN SCALES - GENERAL
PAINLEVEL_OUTOF10: 0 - NO PAIN

## 2025-08-28 ASSESSMENT — PAIN - FUNCTIONAL ASSESSMENT
PAIN_FUNCTIONAL_ASSESSMENT: 0-10

## 2025-08-29 ASSESSMENT — COGNITIVE AND FUNCTIONAL STATUS - GENERAL
MOVING FROM LYING ON BACK TO SITTING ON SIDE OF FLAT BED WITH BEDRAILS: A LOT
MOVING TO AND FROM BED TO CHAIR: A LOT
EATING MEALS: A LITTLE
HELP NEEDED FOR BATHING: A LOT
DAILY ACTIVITIY SCORE: 15
DRESSING REGULAR LOWER BODY CLOTHING: A LOT
PERSONAL GROOMING: A LITTLE
MOBILITY SCORE: 11
TURNING FROM BACK TO SIDE WHILE IN FLAT BAD: A LOT
DRESSING REGULAR UPPER BODY CLOTHING: A LITTLE
WALKING IN HOSPITAL ROOM: A LOT
TOILETING: A LOT
STANDING UP FROM CHAIR USING ARMS: A LOT
CLIMB 3 TO 5 STEPS WITH RAILING: TOTAL

## 2025-08-29 ASSESSMENT — PAIN - FUNCTIONAL ASSESSMENT
PAIN_FUNCTIONAL_ASSESSMENT: 0-10

## 2025-08-29 ASSESSMENT — PAIN SCALES - GENERAL
PAINLEVEL_OUTOF10: 0 - NO PAIN
PAINLEVEL_OUTOF10: 5 - MODERATE PAIN
PAINLEVEL_OUTOF10: 0 - NO PAIN

## 2025-08-30 ASSESSMENT — PAIN - FUNCTIONAL ASSESSMENT: PAIN_FUNCTIONAL_ASSESSMENT: 0-10

## 2025-08-30 ASSESSMENT — ENCOUNTER SYMPTOMS
CHILLS: 0
FEVER: 0
SHORTNESS OF BREATH: 1

## 2025-08-30 ASSESSMENT — PAIN SCALES - GENERAL
PAINLEVEL_OUTOF10: 3
PAINLEVEL_OUTOF10: 3

## 2025-08-31 ASSESSMENT — COGNITIVE AND FUNCTIONAL STATUS - GENERAL
CLIMB 3 TO 5 STEPS WITH RAILING: TOTAL
STANDING UP FROM CHAIR USING ARMS: A LOT
WALKING IN HOSPITAL ROOM: A LOT
PERSONAL GROOMING: A LOT
TOILETING: A LOT
DRESSING REGULAR LOWER BODY CLOTHING: A LOT
HELP NEEDED FOR BATHING: A LOT
MOVING TO AND FROM BED TO CHAIR: A LOT
TURNING FROM BACK TO SIDE WHILE IN FLAT BAD: A LOT
EATING MEALS: A LITTLE
MOBILITY SCORE: 11
DAILY ACTIVITIY SCORE: 13
DRESSING REGULAR UPPER BODY CLOTHING: A LOT
MOVING FROM LYING ON BACK TO SITTING ON SIDE OF FLAT BED WITH BEDRAILS: A LOT

## 2025-08-31 ASSESSMENT — PAIN SCALES - GENERAL
PAINLEVEL_OUTOF10: 0 - NO PAIN
PAINLEVEL_OUTOF10: 5 - MODERATE PAIN
PAINLEVEL_OUTOF10: 0 - NO PAIN

## 2025-08-31 ASSESSMENT — PAIN DESCRIPTION - DESCRIPTORS: DESCRIPTORS: SHARP

## 2025-08-31 ASSESSMENT — PAIN - FUNCTIONAL ASSESSMENT: PAIN_FUNCTIONAL_ASSESSMENT: 0-10

## 2025-09-01 ASSESSMENT — COGNITIVE AND FUNCTIONAL STATUS - GENERAL
CLIMB 3 TO 5 STEPS WITH RAILING: A LOT
HELP NEEDED FOR BATHING: A LOT
STANDING UP FROM CHAIR USING ARMS: A LITTLE
DRESSING REGULAR LOWER BODY CLOTHING: TOTAL
TOILETING: TOTAL
MOBILITY SCORE: 17
PERSONAL GROOMING: A LITTLE
TURNING FROM BACK TO SIDE WHILE IN FLAT BAD: A LITTLE
DRESSING REGULAR UPPER BODY CLOTHING: A LITTLE
WALKING IN HOSPITAL ROOM: A LITTLE
MOVING TO AND FROM BED TO CHAIR: A LITTLE
MOVING FROM LYING ON BACK TO SITTING ON SIDE OF FLAT BED WITH BEDRAILS: A LITTLE
EATING MEALS: A LITTLE
DAILY ACTIVITIY SCORE: 13

## 2025-09-01 ASSESSMENT — PAIN - FUNCTIONAL ASSESSMENT
PAIN_FUNCTIONAL_ASSESSMENT: 0-10

## 2025-09-01 ASSESSMENT — PAIN SCALES - GENERAL
PAINLEVEL_OUTOF10: 0 - NO PAIN

## 2025-09-02 ENCOUNTER — APPOINTMENT (OUTPATIENT)
Dept: RADIOLOGY | Facility: HOSPITAL | Age: 75
DRG: 602 | End: 2025-09-02
Payer: MEDICARE

## 2025-09-02 PROCEDURE — 71045 X-RAY EXAM CHEST 1 VIEW: CPT | Mod: IPSPLIT

## 2025-09-02 ASSESSMENT — COGNITIVE AND FUNCTIONAL STATUS - GENERAL
STANDING UP FROM CHAIR USING ARMS: A LITTLE
DAILY ACTIVITIY SCORE: 13
TURNING FROM BACK TO SIDE WHILE IN FLAT BAD: A LITTLE
CLIMB 3 TO 5 STEPS WITH RAILING: TOTAL
MOVING FROM LYING ON BACK TO SITTING ON SIDE OF FLAT BED WITH BEDRAILS: A LITTLE
PERSONAL GROOMING: A LITTLE
EATING MEALS: A LITTLE
TOILETING: TOTAL
WALKING IN HOSPITAL ROOM: A LOT
HELP NEEDED FOR BATHING: A LOT
MOVING TO AND FROM BED TO CHAIR: A LITTLE
MOBILITY SCORE: 15
DRESSING REGULAR LOWER BODY CLOTHING: TOTAL
DRESSING REGULAR UPPER BODY CLOTHING: A LITTLE

## 2025-09-02 ASSESSMENT — PAIN - FUNCTIONAL ASSESSMENT
PAIN_FUNCTIONAL_ASSESSMENT: 0-10

## 2025-09-02 ASSESSMENT — PAIN SCALES - GENERAL
PAINLEVEL_OUTOF10: 0 - NO PAIN
PAINLEVEL_OUTOF10: 4
PAINLEVEL_OUTOF10: 0 - NO PAIN

## 2025-09-02 ASSESSMENT — PAIN DESCRIPTION - LOCATION: LOCATION: HEAD

## 2025-09-02 ASSESSMENT — ACTIVITIES OF DAILY LIVING (ADL): HOME_MANAGEMENT_TIME_ENTRY: 10

## 2025-09-02 ASSESSMENT — PAIN DESCRIPTION - DESCRIPTORS: DESCRIPTORS: ACHING

## 2025-09-02 ASSESSMENT — PAIN DESCRIPTION - ORIENTATION: ORIENTATION: RIGHT

## 2025-09-03 ASSESSMENT — PAIN - FUNCTIONAL ASSESSMENT
PAIN_FUNCTIONAL_ASSESSMENT: 0-10

## 2025-09-03 ASSESSMENT — PAIN SCALES - GENERAL
PAINLEVEL_OUTOF10: 0 - NO PAIN